# Patient Record
Sex: MALE | Race: ASIAN | NOT HISPANIC OR LATINO | URBAN - METROPOLITAN AREA
[De-identification: names, ages, dates, MRNs, and addresses within clinical notes are randomized per-mention and may not be internally consistent; named-entity substitution may affect disease eponyms.]

---

## 2019-03-03 ENCOUNTER — EMERGENCY (EMERGENCY)
Facility: HOSPITAL | Age: 64
LOS: 1 days | Discharge: HOME | End: 2019-03-03
Attending: EMERGENCY MEDICINE | Admitting: EMERGENCY MEDICINE
Payer: MEDICAID

## 2019-03-03 VITALS
HEART RATE: 98 BPM | DIASTOLIC BLOOD PRESSURE: 91 MMHG | SYSTOLIC BLOOD PRESSURE: 135 MMHG | OXYGEN SATURATION: 99 % | TEMPERATURE: 98 F | RESPIRATION RATE: 18 BRPM

## 2019-03-03 VITALS
OXYGEN SATURATION: 100 % | TEMPERATURE: 99 F | HEART RATE: 99 BPM | SYSTOLIC BLOOD PRESSURE: 165 MMHG | DIASTOLIC BLOOD PRESSURE: 99 MMHG | RESPIRATION RATE: 16 BRPM

## 2019-03-03 LAB
ALBUMIN SERPL ELPH-MCNC: 4.2 G/DL — SIGNIFICANT CHANGE UP (ref 3.3–5)
ALP SERPL-CCNC: 127 U/L — HIGH (ref 40–120)
ALT FLD-CCNC: 25 U/L — SIGNIFICANT CHANGE UP (ref 4–41)
ANION GAP SERPL CALC-SCNC: 14 MMO/L — SIGNIFICANT CHANGE UP (ref 7–14)
AST SERPL-CCNC: 19 U/L — SIGNIFICANT CHANGE UP (ref 4–40)
BASOPHILS # BLD AUTO: 0.06 K/UL — SIGNIFICANT CHANGE UP (ref 0–0.2)
BASOPHILS NFR BLD AUTO: 0.9 % — SIGNIFICANT CHANGE UP (ref 0–2)
BILIRUB SERPL-MCNC: 0.3 MG/DL — SIGNIFICANT CHANGE UP (ref 0.2–1.2)
BUN SERPL-MCNC: 12 MG/DL — SIGNIFICANT CHANGE UP (ref 7–23)
CALCIUM SERPL-MCNC: 9.4 MG/DL — SIGNIFICANT CHANGE UP (ref 8.4–10.5)
CHLORIDE SERPL-SCNC: 95 MMOL/L — LOW (ref 98–107)
CO2 SERPL-SCNC: 25 MMOL/L — SIGNIFICANT CHANGE UP (ref 22–31)
CREAT SERPL-MCNC: 0.83 MG/DL — SIGNIFICANT CHANGE UP (ref 0.5–1.3)
EOSINOPHIL # BLD AUTO: 0.14 K/UL — SIGNIFICANT CHANGE UP (ref 0–0.5)
EOSINOPHIL NFR BLD AUTO: 2.1 % — SIGNIFICANT CHANGE UP (ref 0–6)
GLUCOSE SERPL-MCNC: 426 MG/DL — HIGH (ref 70–99)
HCT VFR BLD CALC: 42.1 % — SIGNIFICANT CHANGE UP (ref 39–50)
HGB BLD-MCNC: 13.9 G/DL — SIGNIFICANT CHANGE UP (ref 13–17)
IMM GRANULOCYTES NFR BLD AUTO: 0.4 % — SIGNIFICANT CHANGE UP (ref 0–1.5)
LYMPHOCYTES # BLD AUTO: 1.3 K/UL — SIGNIFICANT CHANGE UP (ref 1–3.3)
LYMPHOCYTES # BLD AUTO: 19.2 % — SIGNIFICANT CHANGE UP (ref 13–44)
MCHC RBC-ENTMCNC: 25.2 PG — LOW (ref 27–34)
MCHC RBC-ENTMCNC: 33 % — SIGNIFICANT CHANGE UP (ref 32–36)
MCV RBC AUTO: 76.3 FL — LOW (ref 80–100)
MONOCYTES # BLD AUTO: 0.51 K/UL — SIGNIFICANT CHANGE UP (ref 0–0.9)
MONOCYTES NFR BLD AUTO: 7.5 % — SIGNIFICANT CHANGE UP (ref 2–14)
NEUTROPHILS # BLD AUTO: 4.73 K/UL — SIGNIFICANT CHANGE UP (ref 1.8–7.4)
NEUTROPHILS NFR BLD AUTO: 69.9 % — SIGNIFICANT CHANGE UP (ref 43–77)
NRBC # FLD: 0 K/UL — LOW (ref 25–125)
PLATELET # BLD AUTO: 265 K/UL — SIGNIFICANT CHANGE UP (ref 150–400)
PMV BLD: 10.5 FL — SIGNIFICANT CHANGE UP (ref 7–13)
POTASSIUM SERPL-MCNC: 4 MMOL/L — SIGNIFICANT CHANGE UP (ref 3.5–5.3)
POTASSIUM SERPL-SCNC: 4 MMOL/L — SIGNIFICANT CHANGE UP (ref 3.5–5.3)
PROT SERPL-MCNC: 7.8 G/DL — SIGNIFICANT CHANGE UP (ref 6–8.3)
RBC # BLD: 5.52 M/UL — SIGNIFICANT CHANGE UP (ref 4.2–5.8)
RBC # FLD: 14.6 % — HIGH (ref 10.3–14.5)
SODIUM SERPL-SCNC: 134 MMOL/L — LOW (ref 135–145)
TROPONIN T, HIGH SENSITIVITY: 10 NG/L — SIGNIFICANT CHANGE UP (ref ?–14)
TROPONIN T, HIGH SENSITIVITY: 10 NG/L — SIGNIFICANT CHANGE UP (ref ?–14)
WBC # BLD: 6.77 K/UL — SIGNIFICANT CHANGE UP (ref 3.8–10.5)
WBC # FLD AUTO: 6.77 K/UL — SIGNIFICANT CHANGE UP (ref 3.8–10.5)

## 2019-03-03 PROCEDURE — 76705 ECHO EXAM OF ABDOMEN: CPT | Mod: 26

## 2019-03-03 PROCEDURE — 99284 EMERGENCY DEPT VISIT MOD MDM: CPT | Mod: 25

## 2019-03-03 PROCEDURE — 93010 ELECTROCARDIOGRAM REPORT: CPT

## 2019-03-03 RX ORDER — FAMOTIDINE 10 MG/ML
20 INJECTION INTRAVENOUS ONCE
Qty: 0 | Refills: 0 | Status: COMPLETED | OUTPATIENT
Start: 2019-03-03 | End: 2019-03-03

## 2019-03-03 RX ADMIN — FAMOTIDINE 20 MILLIGRAM(S): 10 INJECTION INTRAVENOUS at 12:27

## 2019-03-03 RX ADMIN — Medication 30 MILLILITER(S): at 12:27

## 2019-03-03 NOTE — ED PROVIDER NOTE - CLINICAL SUMMARY MEDICAL DECISION MAKING FREE TEXT BOX
male with abdominal pain, no other symptoms, appears well. likely msk vs gerd, however will rule out GB disease, assess LFTs with labs, and screening EKG for STEMI. unlikely vascular or ACS given signs and symptoms. patient very well appearing. if negative work up will dc home with pmd follow up. Rosario att: 62 yo male with abdominal pain, no other symptoms, appears well. likely msk vs gerd, however will rule out GB disease, assess LFTs with labs, and screening EKG for STEMI. unlikely vascular or ACS given signs and symptoms. patient very well appearing. if negative work up will dc home with pmd follow up.

## 2019-03-03 NOTE — ED PROVIDER NOTE - PROGRESS NOTE DETAILS
Ar COLON - PT seen and reassessed.  Patient symptomatically improved.   AAOX3, NAD, VSS.  Discussed test results w/ patient, given copy of results. Patient verbalized understanding of hospital course and outpatient plans, has decisional making capacity.  Will follow-up with Primary care doctor in the next 5-7 days; patient will call for an appointment. Will return to the ED if there is any worsening of symptoms.  Patient able to ambulate w/o difficulty, is tolerating PO intake

## 2019-03-03 NOTE — ED PROVIDER NOTE - OBJECTIVE STATEMENT
63yoM hx of htn, DM pw pain in right upper abdomen for two weeks. Pain is constant, non radiating, described as soreness, not worsened by exertion or eating or laying back, not improved with home Tylenol. Patient denies fevers, chills, nausea, vomiting, diarrhea, chest pain, shortness of breath, diarrhea, weakness, numbness, tingling, headache ,recent travel or other issues.   patient with hard pebble like stools.

## 2019-03-03 NOTE — ED PROCEDURE NOTE - PROCEDURE ADDITIONAL DETAILS
67546, Ultrasound, Abdomen, limited    Focused ED ultrasound to evaluate for cholelithiasis or cholecystitis:    Indication: RUQ abdominal pain  Findings:No gallstones or gallbladder sludge visualized  Gallbladder wall wnl  gallbladder of normal size  no pericholecystic fluid  negative sonographic flanagan's  visualized CBD wnl    Impression: Normal focused ED ultrasound of the biliary system    Procedure note and images placed in chart

## 2019-03-03 NOTE — ED PROVIDER NOTE - NSFOLLOWUPINSTRUCTIONS_ED_ALL_ED_FT
1) Follow up with your doctor in 1 week. Call for an appointment.   2) Return to the ER immediately for new or worsening symptoms including uncontrollable chest pain, vomiting or other issues.   3) continue to take your home medications as prescribed.

## 2019-03-03 NOTE — ED ADULT NURSE NOTE - OBJECTIVE STATEMENT
Patient presented to ED A&O x4, with c/o RUQ abdominal pain x 2 weeks, denies any N/V/D. Blood work drawn and sent to lab.  ER physician evaluated patient, medication ordered and administered.  Will continue to monitor patient closely. Francisco J DUNCAN.

## 2019-05-27 ENCOUNTER — EMERGENCY (EMERGENCY)
Facility: HOSPITAL | Age: 64
LOS: 1 days | Discharge: HOME | End: 2019-05-27
Attending: EMERGENCY MEDICINE | Admitting: EMERGENCY MEDICINE
Payer: MEDICAID

## 2019-05-27 VITALS
DIASTOLIC BLOOD PRESSURE: 113 MMHG | OXYGEN SATURATION: 100 % | SYSTOLIC BLOOD PRESSURE: 151 MMHG | RESPIRATION RATE: 17 BRPM | HEART RATE: 101 BPM | TEMPERATURE: 98 F

## 2019-05-27 VITALS
DIASTOLIC BLOOD PRESSURE: 106 MMHG | RESPIRATION RATE: 16 BRPM | HEART RATE: 117 BPM | SYSTOLIC BLOOD PRESSURE: 176 MMHG | TEMPERATURE: 99 F | OXYGEN SATURATION: 99 %

## 2019-05-27 PROBLEM — I10 ESSENTIAL (PRIMARY) HYPERTENSION: Chronic | Status: ACTIVE | Noted: 2019-03-03

## 2019-05-27 LAB
ALBUMIN SERPL ELPH-MCNC: 4.4 G/DL — SIGNIFICANT CHANGE UP (ref 3.3–5)
ALP SERPL-CCNC: 107 U/L — SIGNIFICANT CHANGE UP (ref 40–120)
ALT FLD-CCNC: 21 U/L — SIGNIFICANT CHANGE UP (ref 4–41)
ANION GAP SERPL CALC-SCNC: 14 MMO/L — SIGNIFICANT CHANGE UP (ref 7–14)
AST SERPL-CCNC: 18 U/L — SIGNIFICANT CHANGE UP (ref 4–40)
BASE EXCESS BLDV CALC-SCNC: 2.2 MMOL/L — SIGNIFICANT CHANGE UP
BASOPHILS # BLD AUTO: 0.06 K/UL — SIGNIFICANT CHANGE UP (ref 0–0.2)
BASOPHILS NFR BLD AUTO: 0.6 % — SIGNIFICANT CHANGE UP (ref 0–2)
BILIRUB SERPL-MCNC: 0.3 MG/DL — SIGNIFICANT CHANGE UP (ref 0.2–1.2)
BLOOD GAS VENOUS - CREATININE: 0.7 MG/DL — SIGNIFICANT CHANGE UP (ref 0.5–1.3)
BLOOD GAS VENOUS - FIO2: 21 — SIGNIFICANT CHANGE UP
BUN SERPL-MCNC: 15 MG/DL — SIGNIFICANT CHANGE UP (ref 7–23)
CALCIUM SERPL-MCNC: 9.5 MG/DL — SIGNIFICANT CHANGE UP (ref 8.4–10.5)
CHLORIDE BLDV-SCNC: 98 MMOL/L — SIGNIFICANT CHANGE UP (ref 96–108)
CHLORIDE SERPL-SCNC: 95 MMOL/L — LOW (ref 98–107)
CO2 SERPL-SCNC: 23 MMOL/L — SIGNIFICANT CHANGE UP (ref 22–31)
CREAT SERPL-MCNC: 0.74 MG/DL — SIGNIFICANT CHANGE UP (ref 0.5–1.3)
EOSINOPHIL # BLD AUTO: 0.14 K/UL — SIGNIFICANT CHANGE UP (ref 0–0.5)
EOSINOPHIL NFR BLD AUTO: 1.5 % — SIGNIFICANT CHANGE UP (ref 0–6)
GAS PNL BLDV: 135 MMOL/L — LOW (ref 136–146)
GLUCOSE BLDV-MCNC: 422 MG/DL — HIGH (ref 70–99)
GLUCOSE SERPL-MCNC: 447 MG/DL — HIGH (ref 70–99)
HCO3 BLDV-SCNC: 25 MMOL/L — SIGNIFICANT CHANGE UP (ref 20–27)
HCT VFR BLD CALC: 38.6 % — LOW (ref 39–50)
HCT VFR BLDV CALC: 41 % — SIGNIFICANT CHANGE UP (ref 39–51)
HGB BLD-MCNC: 12.7 G/DL — LOW (ref 13–17)
HGB BLDV-MCNC: 13.3 G/DL — SIGNIFICANT CHANGE UP (ref 13–17)
IMM GRANULOCYTES NFR BLD AUTO: 0.5 % — SIGNIFICANT CHANGE UP (ref 0–1.5)
LACTATE BLDV-MCNC: 2.2 MMOL/L — HIGH (ref 0.5–2)
LYMPHOCYTES # BLD AUTO: 1.62 K/UL — SIGNIFICANT CHANGE UP (ref 1–3.3)
LYMPHOCYTES # BLD AUTO: 17 % — SIGNIFICANT CHANGE UP (ref 13–44)
MCHC RBC-ENTMCNC: 25 PG — LOW (ref 27–34)
MCHC RBC-ENTMCNC: 32.9 % — SIGNIFICANT CHANGE UP (ref 32–36)
MCV RBC AUTO: 76 FL — LOW (ref 80–100)
MONOCYTES # BLD AUTO: 0.84 K/UL — SIGNIFICANT CHANGE UP (ref 0–0.9)
MONOCYTES NFR BLD AUTO: 8.8 % — SIGNIFICANT CHANGE UP (ref 2–14)
NEUTROPHILS # BLD AUTO: 6.8 K/UL — SIGNIFICANT CHANGE UP (ref 1.8–7.4)
NEUTROPHILS NFR BLD AUTO: 71.6 % — SIGNIFICANT CHANGE UP (ref 43–77)
NRBC # FLD: 0 K/UL — SIGNIFICANT CHANGE UP (ref 0–0)
PCO2 BLDV: 52 MMHG — HIGH (ref 41–51)
PH BLDV: 7.34 PH — SIGNIFICANT CHANGE UP (ref 7.32–7.43)
PLATELET # BLD AUTO: 203 K/UL — SIGNIFICANT CHANGE UP (ref 150–400)
PMV BLD: 10.6 FL — SIGNIFICANT CHANGE UP (ref 7–13)
PO2 BLDV: 30 MMHG — LOW (ref 35–40)
POTASSIUM BLDV-SCNC: 4.3 MMOL/L — SIGNIFICANT CHANGE UP (ref 3.4–4.5)
POTASSIUM SERPL-MCNC: 4.3 MMOL/L — SIGNIFICANT CHANGE UP (ref 3.5–5.3)
POTASSIUM SERPL-SCNC: 4.3 MMOL/L — SIGNIFICANT CHANGE UP (ref 3.5–5.3)
PROT SERPL-MCNC: 8.1 G/DL — SIGNIFICANT CHANGE UP (ref 6–8.3)
RBC # BLD: 5.08 M/UL — SIGNIFICANT CHANGE UP (ref 4.2–5.8)
RBC # FLD: 15.3 % — HIGH (ref 10.3–14.5)
SAO2 % BLDV: 52.6 % — LOW (ref 60–85)
SODIUM SERPL-SCNC: 132 MMOL/L — LOW (ref 135–145)
TROPONIN T, HIGH SENSITIVITY: 8 NG/L — SIGNIFICANT CHANGE UP (ref ?–14)
WBC # BLD: 9.51 K/UL — SIGNIFICANT CHANGE UP (ref 3.8–10.5)
WBC # FLD AUTO: 9.51 K/UL — SIGNIFICANT CHANGE UP (ref 3.8–10.5)

## 2019-05-27 PROCEDURE — 73030 X-RAY EXAM OF SHOULDER: CPT | Mod: 26,LT

## 2019-05-27 PROCEDURE — 99284 EMERGENCY DEPT VISIT MOD MDM: CPT | Mod: 25

## 2019-05-27 PROCEDURE — 20610 DRAIN/INJ JOINT/BURSA W/O US: CPT | Mod: LT

## 2019-05-27 PROCEDURE — 71275 CT ANGIOGRAPHY CHEST: CPT | Mod: 26

## 2019-05-27 RX ORDER — SODIUM CHLORIDE 9 MG/ML
2000 INJECTION INTRAMUSCULAR; INTRAVENOUS; SUBCUTANEOUS ONCE
Refills: 0 | Status: COMPLETED | OUTPATIENT
Start: 2019-05-27 | End: 2019-05-27

## 2019-05-27 RX ORDER — LIDOCAINE 4 G/100G
1 CREAM TOPICAL ONCE
Refills: 0 | Status: COMPLETED | OUTPATIENT
Start: 2019-05-27 | End: 2019-05-27

## 2019-05-27 RX ORDER — ACETAMINOPHEN 500 MG
650 TABLET ORAL ONCE
Refills: 0 | Status: COMPLETED | OUTPATIENT
Start: 2019-05-27 | End: 2019-05-27

## 2019-05-27 RX ORDER — TRIAMCINOLONE 4 MG
40 TABLET ORAL ONCE
Refills: 0 | Status: COMPLETED | OUTPATIENT
Start: 2019-05-27 | End: 2019-05-27

## 2019-05-27 RX ORDER — LIDOCAINE HCL 20 MG/ML
9 VIAL (ML) INJECTION ONCE
Refills: 0 | Status: COMPLETED | OUTPATIENT
Start: 2019-05-27 | End: 2019-05-27

## 2019-05-27 RX ORDER — INSULIN LISPRO 100/ML
8 VIAL (ML) SUBCUTANEOUS ONCE
Refills: 0 | Status: DISCONTINUED | OUTPATIENT
Start: 2019-05-27 | End: 2019-05-27

## 2019-05-27 RX ADMIN — Medication 40 MILLIGRAM(S): at 15:16

## 2019-05-27 RX ADMIN — SODIUM CHLORIDE 2000 MILLILITER(S): 9 INJECTION INTRAMUSCULAR; INTRAVENOUS; SUBCUTANEOUS at 12:45

## 2019-05-27 RX ADMIN — Medication 9 MILLILITER(S): at 15:15

## 2019-05-27 RX ADMIN — Medication 650 MILLIGRAM(S): at 12:44

## 2019-05-27 RX ADMIN — LIDOCAINE 1 PATCH: 4 CREAM TOPICAL at 12:44

## 2019-05-27 NOTE — ED PROVIDER NOTE - ATTENDING CONTRIBUTION TO CARE
JATIN GOYAL MD: Reviewed and agree with the HPI as documented below:  63M hx htn, dm, is  by trade, p/w 1 day of L arm and shoulder pain with restricted range of motion. Pt never had this symptom before. Says pain is a 9/10. Tried a dose of ibuprofen last night with minimal relief. Denies hx of heart problems, cp, sob, cough.     Pt also complains of 3 months of R sided rib pain that radiates into his RUQ. Pt was seen in ED previously for said symptom and discharged after negative w/u. pt went and saw PCP who referred him to GI who cleared him after negative endoscopy. pt also claims he had full abdominal ultrasound also found to be neg.  CT A/P and CT chest reviewed.  D/W patient mutiple small nodules.  Patient states he had a bronchoscopy too. Rajendra attempts to explain to patient that we're not going to figure out where this chronic pain is coming from in the ER after an extensive outpt work-up with multi-specality approach and testing.        PHYSICAL EXAM:  Vital signs reviewed.  GENERAL: Patient is awake and alert and in no acute distress.  Non-toxic appearing.  A+Ox4  HEAD:  Airway patent.  No oropharyngeal edema.  No stridor.  Auricles are normal.    EYES: EOM grossly intact, conjunctiva non-injected and sclera clear  NECK: Supple, No vertebral point tenderness to palpation.  CHEST/LUNG: Lungs clear to auscultation bilaterally; no wheeze, no rhonchi,  no rales.    HEART: Regular rate and rhythm;   ABDOMEN: Soft, non-tender to palpation.  No rebound/no guarding.  Bowel sounds present x 4.   MSK/EXTREMITIES: No clubbing or cyanosis. Back is nontender, with no vertebral point tenderness to palpation, no CVAT.  Moving all 4 extremities with severe limited ROM at the LEFT shoulder  FF to 35 degress and ABDucatio to about 30 degrees.   FROM IR.    NEURO: Neurologically grossly intact.   No obvious deficits.   PSYCH: Psychiatrically normal mood and affect.  No apparent risk to self or others.       DR. BARRERA, ATTENDING MD:    I performed a face to face bedside interview with patient regarding history of present illness, review of symptoms and past medical history. I completed an independent physical exam.  I have discussed patient's plan of care with the team of health care providers.   I agree with note as stated above, having amended the EMR as needed to reflect my findings. I have discussed the assessment and plan of care.  This includes during the time I functioned as the attending physician for this patient.    JATIN GOYAL MD: Improved pain and ROM after injection.  NVI.  Understands ROM exercises to prevent adhesive capsulitis

## 2019-05-27 NOTE — ED PROVIDER NOTE - CLINICAL SUMMARY MEDICAL DECISION MAKING FREE TEXT BOX
63M hx htn, dm, p/w L arm pain w/ reduced ROM, more pain when attempts to range. Seems MSK in nature. pt notes he has not taken his insuylin since the pain started. Found to be hyperglycemic and hypertensive in triage. Will r/o DKA, CAD. Plan: labs, trop, ekg, cxr, ua, fluids, reasssess. 63M hx htn, dm, p/w L arm pain w/ reduced ROM, more pain when attempts to range. Seems MSK in nature. pt notes he has not taken his insulin since the pain started. Found to be hyperglycemic and hypertensive in triage. Will check labs for DKA, and CT angio for large PE. Plan: labs, trop, ekg, cxr, ua, fluids, reasssess.

## 2019-05-27 NOTE — ED PROVIDER NOTE - OBJECTIVE STATEMENT
63M hx htn, dm, is  by trade, p/w 1 day of L arm and shoulder pain with restricted range of motion. Pt never had this symptom before. Says pain is a 9/10. Tried a dose of ibuprofen last night with minimal relief. Denies hx of heart problems, cp, sob, cough.     Pt also complains of 3 months of R sided rib pain that radiates into his RUQ. Pt was seen in ED previously for said symptom and discharged after negative w/u. pt went and saw PCP who referred him to GI who cleared him after neagtaive endoscopy. pt also claims he had full abdominal ultrasound also found to be negtative. Pt is at a loss bc he continues to have 5-6/10 pain in the are.

## 2019-05-27 NOTE — ED ADULT NURSE NOTE - OBJECTIVE STATEMENT
Pt received to room 19 complaining of 1 day of L arm and shoulder pain, denies recent trauma to arm. Pt also complaining of R sided rib pain x 3 months. Pt states he was seen in the ED for his rib pain and discharged. pt denies chest pain, SOB, N/V/D, fever or chills. IV access obtained, labs drawn and sent.

## 2019-05-27 NOTE — ED PROVIDER NOTE - PROGRESS NOTE DETAILS
JATIN GOYAL MD: Improved pain and ROM after injection.  NVI.  Understands ROM exercises to prevent adhesive capsulitis

## 2019-05-27 NOTE — ED PROVIDER NOTE - CARE PLAN
Principal Discharge DX:	Hyperglycemia Principal Discharge DX:	Hyperglycemia  Secondary Diagnosis:	Left shoulder pain  Secondary Diagnosis:	Non-compliance with treatment

## 2019-05-27 NOTE — ED ADULT TRIAGE NOTE - CHIEF COMPLAINT QUOTE
pt c/o atraumatic non-radiating L shoulder pain x1 day, took motrin yesterday with no relief. Also c/o R-sided rib pain x 3 months. Pt tachycardic, hypertensive, hyperglycemic in triage. Denies CP/ palpitations/ SOB/ dizziness/ fevers/ chills. PMH HTN DM HLD.

## 2019-05-30 NOTE — ED PROCEDURE NOTE - CPROC ED POST PROC CARE GUIDE1
Verbal/written post procedure instructions were given to patient/caregiver./Instructed patient/caregiver regarding signs and symptoms of infection. alert and awake

## 2019-05-30 NOTE — ED PROCEDURE NOTE - PROCEDURE ADDITIONAL DETAILS
JATIN GOYAL MD: Improved pain and ROM after injection.  NVI.  Understands ROM exercises to prevent adhesive capsulitis and needs strict blood sugar control secondary to steroid intra-articular.

## 2019-10-01 ENCOUNTER — OUTPATIENT (OUTPATIENT)
Dept: OUTPATIENT SERVICES | Facility: HOSPITAL | Age: 64
LOS: 1 days | End: 2019-10-01
Payer: MEDICAID

## 2019-10-01 PROCEDURE — G9001: CPT

## 2019-10-10 ENCOUNTER — INPATIENT (INPATIENT)
Facility: HOSPITAL | Age: 64
LOS: 2 days | Discharge: HOME | End: 2019-10-13
Attending: INTERNAL MEDICINE | Admitting: INTERNAL MEDICINE
Payer: MEDICAID

## 2019-10-10 VITALS
HEART RATE: 125 BPM | TEMPERATURE: 98 F | RESPIRATION RATE: 18 BRPM | SYSTOLIC BLOOD PRESSURE: 169 MMHG | OXYGEN SATURATION: 100 % | DIASTOLIC BLOOD PRESSURE: 102 MMHG

## 2019-10-10 DIAGNOSIS — E78.5 HYPERLIPIDEMIA, UNSPECIFIED: ICD-10-CM

## 2019-10-10 DIAGNOSIS — Z29.9 ENCOUNTER FOR PROPHYLACTIC MEASURES, UNSPECIFIED: ICD-10-CM

## 2019-10-10 DIAGNOSIS — R73.9 HYPERGLYCEMIA, UNSPECIFIED: ICD-10-CM

## 2019-10-10 DIAGNOSIS — E87.2 ACIDOSIS: ICD-10-CM

## 2019-10-10 DIAGNOSIS — I10 ESSENTIAL (PRIMARY) HYPERTENSION: ICD-10-CM

## 2019-10-10 DIAGNOSIS — R33.9 RETENTION OF URINE, UNSPECIFIED: ICD-10-CM

## 2019-10-10 DIAGNOSIS — E11.9 TYPE 2 DIABETES MELLITUS WITHOUT COMPLICATIONS: ICD-10-CM

## 2019-10-10 DIAGNOSIS — E11.10 TYPE 2 DIABETES MELLITUS WITH KETOACIDOSIS WITHOUT COMA: ICD-10-CM

## 2019-10-10 DIAGNOSIS — E87.1 HYPO-OSMOLALITY AND HYPONATREMIA: ICD-10-CM

## 2019-10-10 DIAGNOSIS — E86.0 DEHYDRATION: ICD-10-CM

## 2019-10-10 LAB
ALBUMIN SERPL ELPH-MCNC: 3.5 G/DL — SIGNIFICANT CHANGE UP (ref 3.3–5)
ALBUMIN SERPL ELPH-MCNC: 4.3 G/DL — SIGNIFICANT CHANGE UP (ref 3.3–5)
ALP SERPL-CCNC: 121 U/L — HIGH (ref 40–120)
ALP SERPL-CCNC: 161 U/L — HIGH (ref 40–120)
ALT FLD-CCNC: 26 U/L — SIGNIFICANT CHANGE UP (ref 4–41)
ALT FLD-CCNC: 33 U/L — SIGNIFICANT CHANGE UP (ref 4–41)
ANION GAP SERPL CALC-SCNC: 15 MMO/L — HIGH (ref 7–14)
ANION GAP SERPL CALC-SCNC: 16 MMO/L — HIGH (ref 7–14)
ANION GAP SERPL CALC-SCNC: 17 MMO/L — HIGH (ref 7–14)
ANION GAP SERPL CALC-SCNC: 24 MMO/L — HIGH (ref 7–14)
APPEARANCE UR: CLEAR — SIGNIFICANT CHANGE UP
AST SERPL-CCNC: 21 U/L — SIGNIFICANT CHANGE UP (ref 4–40)
AST SERPL-CCNC: 27 U/L — SIGNIFICANT CHANGE UP (ref 4–40)
B-OH-BUTYR SERPL-SCNC: 4.9 MMOL/L — HIGH (ref 0–0.4)
BACTERIA # UR AUTO: NEGATIVE — SIGNIFICANT CHANGE UP
BASE EXCESS BLDV CALC-SCNC: -0.2 MMOL/L — SIGNIFICANT CHANGE UP
BASE EXCESS BLDV CALC-SCNC: -0.4 MMOL/L — SIGNIFICANT CHANGE UP
BASE EXCESS BLDV CALC-SCNC: -1.9 MMOL/L — SIGNIFICANT CHANGE UP
BASE EXCESS BLDV CALC-SCNC: -3.3 MMOL/L — SIGNIFICANT CHANGE UP
BASOPHILS # BLD AUTO: 0.05 K/UL — SIGNIFICANT CHANGE UP (ref 0–0.2)
BASOPHILS NFR BLD AUTO: 0.6 % — SIGNIFICANT CHANGE UP (ref 0–2)
BILIRUB SERPL-MCNC: 0.3 MG/DL — SIGNIFICANT CHANGE UP (ref 0.2–1.2)
BILIRUB SERPL-MCNC: < 0.2 MG/DL — LOW (ref 0.2–1.2)
BILIRUB UR-MCNC: NEGATIVE — SIGNIFICANT CHANGE UP
BLOOD GAS VENOUS - CREATININE: 0.7 MG/DL — SIGNIFICANT CHANGE UP (ref 0.5–1.3)
BLOOD GAS VENOUS - CREATININE: 0.78 MG/DL — SIGNIFICANT CHANGE UP (ref 0.5–1.3)
BLOOD GAS VENOUS - CREATININE: 0.79 MG/DL — SIGNIFICANT CHANGE UP (ref 0.5–1.3)
BLOOD GAS VENOUS - CREATININE: 0.88 MG/DL — SIGNIFICANT CHANGE UP (ref 0.5–1.3)
BLOOD GAS VENOUS - FIO2: 21 — SIGNIFICANT CHANGE UP
BLOOD GAS VENOUS - FIO2: 21 — SIGNIFICANT CHANGE UP
BLOOD UR QL VISUAL: SIGNIFICANT CHANGE UP
BUN SERPL-MCNC: 14 MG/DL — SIGNIFICANT CHANGE UP (ref 7–23)
BUN SERPL-MCNC: 15 MG/DL — SIGNIFICANT CHANGE UP (ref 7–23)
BUN SERPL-MCNC: 17 MG/DL — SIGNIFICANT CHANGE UP (ref 7–23)
BUN SERPL-MCNC: 21 MG/DL — SIGNIFICANT CHANGE UP (ref 7–23)
CALCIUM SERPL-MCNC: 8.4 MG/DL — SIGNIFICANT CHANGE UP (ref 8.4–10.5)
CALCIUM SERPL-MCNC: 8.7 MG/DL — SIGNIFICANT CHANGE UP (ref 8.4–10.5)
CALCIUM SERPL-MCNC: 8.7 MG/DL — SIGNIFICANT CHANGE UP (ref 8.4–10.5)
CALCIUM SERPL-MCNC: 9.4 MG/DL — SIGNIFICANT CHANGE UP (ref 8.4–10.5)
CHLORIDE BLDV-SCNC: 106 MMOL/L — SIGNIFICANT CHANGE UP (ref 96–108)
CHLORIDE BLDV-SCNC: 108 MMOL/L — SIGNIFICANT CHANGE UP (ref 96–108)
CHLORIDE BLDV-SCNC: 109 MMOL/L — HIGH (ref 96–108)
CHLORIDE BLDV-SCNC: 95 MMOL/L — LOW (ref 96–108)
CHLORIDE SERPL-SCNC: 102 MMOL/L — SIGNIFICANT CHANGE UP (ref 98–107)
CHLORIDE SERPL-SCNC: 102 MMOL/L — SIGNIFICANT CHANGE UP (ref 98–107)
CHLORIDE SERPL-SCNC: 104 MMOL/L — SIGNIFICANT CHANGE UP (ref 98–107)
CHLORIDE SERPL-SCNC: 87 MMOL/L — LOW (ref 98–107)
CO2 SERPL-SCNC: 18 MMOL/L — LOW (ref 22–31)
CO2 SERPL-SCNC: 20 MMOL/L — LOW (ref 22–31)
CO2 SERPL-SCNC: 20 MMOL/L — LOW (ref 22–31)
CO2 SERPL-SCNC: 21 MMOL/L — LOW (ref 22–31)
COLOR SPEC: COLORLESS — SIGNIFICANT CHANGE UP
CREAT SERPL-MCNC: 0.72 MG/DL — SIGNIFICANT CHANGE UP (ref 0.5–1.3)
CREAT SERPL-MCNC: 0.75 MG/DL — SIGNIFICANT CHANGE UP (ref 0.5–1.3)
CREAT SERPL-MCNC: 0.79 MG/DL — SIGNIFICANT CHANGE UP (ref 0.5–1.3)
CREAT SERPL-MCNC: 0.88 MG/DL — SIGNIFICANT CHANGE UP (ref 0.5–1.3)
EOSINOPHIL # BLD AUTO: 0.03 K/UL — SIGNIFICANT CHANGE UP (ref 0–0.5)
EOSINOPHIL NFR BLD AUTO: 0.3 % — SIGNIFICANT CHANGE UP (ref 0–6)
GAS PNL BLDV: 132 MMOL/L — LOW (ref 136–146)
GAS PNL BLDV: 139 MMOL/L — SIGNIFICANT CHANGE UP (ref 136–146)
GAS PNL BLDV: 140 MMOL/L — SIGNIFICANT CHANGE UP (ref 136–146)
GAS PNL BLDV: 140 MMOL/L — SIGNIFICANT CHANGE UP (ref 136–146)
GLUCOSE BLDV-MCNC: 231 MG/DL — HIGH (ref 70–99)
GLUCOSE BLDV-MCNC: 234 MG/DL — HIGH (ref 70–99)
GLUCOSE BLDV-MCNC: 274 MG/DL — HIGH (ref 70–99)
GLUCOSE BLDV-MCNC: 758 MG/DL — CRITICAL HIGH (ref 70–99)
GLUCOSE SERPL-MCNC: 239 MG/DL — HIGH (ref 70–99)
GLUCOSE SERPL-MCNC: 258 MG/DL — HIGH (ref 70–99)
GLUCOSE SERPL-MCNC: 311 MG/DL — HIGH (ref 70–99)
GLUCOSE SERPL-MCNC: 739 MG/DL — CRITICAL HIGH (ref 70–99)
GLUCOSE UR-MCNC: >1000 — HIGH
HCO3 BLDV-SCNC: 19 MMOL/L — LOW (ref 20–27)
HCO3 BLDV-SCNC: 21 MMOL/L — SIGNIFICANT CHANGE UP (ref 20–27)
HCO3 BLDV-SCNC: 22 MMOL/L — SIGNIFICANT CHANGE UP (ref 20–27)
HCO3 BLDV-SCNC: 23 MMOL/L — SIGNIFICANT CHANGE UP (ref 20–27)
HCT VFR BLD CALC: 45.9 % — SIGNIFICANT CHANGE UP (ref 39–50)
HCT VFR BLDV CALC: 42.5 % — SIGNIFICANT CHANGE UP (ref 39–51)
HCT VFR BLDV CALC: 43.2 % — SIGNIFICANT CHANGE UP (ref 39–51)
HCT VFR BLDV CALC: 43.4 % — SIGNIFICANT CHANGE UP (ref 39–51)
HCT VFR BLDV CALC: 50 % — SIGNIFICANT CHANGE UP (ref 39–51)
HGB BLD-MCNC: 14.9 G/DL — SIGNIFICANT CHANGE UP (ref 13–17)
HGB BLDV-MCNC: 13.9 G/DL — SIGNIFICANT CHANGE UP (ref 13–17)
HGB BLDV-MCNC: 14.1 G/DL — SIGNIFICANT CHANGE UP (ref 13–17)
HGB BLDV-MCNC: 14.1 G/DL — SIGNIFICANT CHANGE UP (ref 13–17)
HGB BLDV-MCNC: 16.3 G/DL — SIGNIFICANT CHANGE UP (ref 13–17)
HYALINE CASTS # UR AUTO: NEGATIVE — SIGNIFICANT CHANGE UP
IMM GRANULOCYTES NFR BLD AUTO: 0.6 % — SIGNIFICANT CHANGE UP (ref 0–1.5)
KETONES UR-MCNC: HIGH
LACTATE BLDV-MCNC: 2.1 MMOL/L — HIGH (ref 0.5–2)
LACTATE BLDV-MCNC: 2.5 MMOL/L — HIGH (ref 0.5–2)
LACTATE BLDV-MCNC: 2.6 MMOL/L — HIGH (ref 0.5–2)
LACTATE BLDV-MCNC: 3.8 MMOL/L — HIGH (ref 0.5–2)
LEUKOCYTE ESTERASE UR-ACNC: NEGATIVE — SIGNIFICANT CHANGE UP
LIDOCAIN IGE QN: 83.7 U/L — HIGH (ref 7–60)
LYMPHOCYTES # BLD AUTO: 1.12 K/UL — SIGNIFICANT CHANGE UP (ref 1–3.3)
LYMPHOCYTES # BLD AUTO: 12.5 % — LOW (ref 13–44)
MAGNESIUM SERPL-MCNC: 2 MG/DL — SIGNIFICANT CHANGE UP (ref 1.6–2.6)
MCHC RBC-ENTMCNC: 25.4 PG — LOW (ref 27–34)
MCHC RBC-ENTMCNC: 32.5 % — SIGNIFICANT CHANGE UP (ref 32–36)
MCV RBC AUTO: 78.2 FL — LOW (ref 80–100)
MONOCYTES # BLD AUTO: 0.41 K/UL — SIGNIFICANT CHANGE UP (ref 0–0.9)
MONOCYTES NFR BLD AUTO: 4.6 % — SIGNIFICANT CHANGE UP (ref 2–14)
NEUTROPHILS # BLD AUTO: 7.32 K/UL — SIGNIFICANT CHANGE UP (ref 1.8–7.4)
NEUTROPHILS NFR BLD AUTO: 81.4 % — HIGH (ref 43–77)
NITRITE UR-MCNC: NEGATIVE — SIGNIFICANT CHANGE UP
NRBC # FLD: 0 K/UL — SIGNIFICANT CHANGE UP (ref 0–0)
PCO2 BLDV: 46 MMHG — SIGNIFICANT CHANGE UP (ref 41–51)
PCO2 BLDV: 46 MMHG — SIGNIFICANT CHANGE UP (ref 41–51)
PCO2 BLDV: 49 MMHG — SIGNIFICANT CHANGE UP (ref 41–51)
PCO2 BLDV: 51 MMHG — SIGNIFICANT CHANGE UP (ref 41–51)
PH BLDV: 7.27 PH — LOW (ref 7.32–7.43)
PH BLDV: 7.32 PH — SIGNIFICANT CHANGE UP (ref 7.32–7.43)
PH BLDV: 7.33 PH — SIGNIFICANT CHANGE UP (ref 7.32–7.43)
PH BLDV: 7.35 PH — SIGNIFICANT CHANGE UP (ref 7.32–7.43)
PH UR: 6 — SIGNIFICANT CHANGE UP (ref 5–8)
PHOSPHATE SERPL-MCNC: 2.1 MG/DL — LOW (ref 2.5–4.5)
PLATELET # BLD AUTO: 294 K/UL — SIGNIFICANT CHANGE UP (ref 150–400)
PMV BLD: 11.7 FL — SIGNIFICANT CHANGE UP (ref 7–13)
PO2 BLDV: 25 MMHG — LOW (ref 35–40)
PO2 BLDV: 36 MMHG — SIGNIFICANT CHANGE UP (ref 35–40)
PO2 BLDV: < 24 MMHG — LOW (ref 35–40)
PO2 BLDV: < 24 MMHG — LOW (ref 35–40)
POTASSIUM BLDV-SCNC: 3.7 MMOL/L — SIGNIFICANT CHANGE UP (ref 3.4–4.5)
POTASSIUM BLDV-SCNC: 3.8 MMOL/L — SIGNIFICANT CHANGE UP (ref 3.4–4.5)
POTASSIUM BLDV-SCNC: 3.9 MMOL/L — SIGNIFICANT CHANGE UP (ref 3.4–4.5)
POTASSIUM BLDV-SCNC: 4.2 MMOL/L — SIGNIFICANT CHANGE UP (ref 3.4–4.5)
POTASSIUM SERPL-MCNC: 4 MMOL/L — SIGNIFICANT CHANGE UP (ref 3.5–5.3)
POTASSIUM SERPL-MCNC: 4 MMOL/L — SIGNIFICANT CHANGE UP (ref 3.5–5.3)
POTASSIUM SERPL-MCNC: 4.1 MMOL/L — SIGNIFICANT CHANGE UP (ref 3.5–5.3)
POTASSIUM SERPL-MCNC: 4.1 MMOL/L — SIGNIFICANT CHANGE UP (ref 3.5–5.3)
POTASSIUM SERPL-SCNC: 4 MMOL/L — SIGNIFICANT CHANGE UP (ref 3.5–5.3)
POTASSIUM SERPL-SCNC: 4 MMOL/L — SIGNIFICANT CHANGE UP (ref 3.5–5.3)
POTASSIUM SERPL-SCNC: 4.1 MMOL/L — SIGNIFICANT CHANGE UP (ref 3.5–5.3)
POTASSIUM SERPL-SCNC: 4.1 MMOL/L — SIGNIFICANT CHANGE UP (ref 3.5–5.3)
PROT SERPL-MCNC: 7.1 G/DL — SIGNIFICANT CHANGE UP (ref 6–8.3)
PROT SERPL-MCNC: 8.5 G/DL — HIGH (ref 6–8.3)
PROT UR-MCNC: 100 — HIGH
RBC # BLD: 5.87 M/UL — HIGH (ref 4.2–5.8)
RBC # FLD: 14.3 % — SIGNIFICANT CHANGE UP (ref 10.3–14.5)
RBC CASTS # UR COMP ASSIST: SIGNIFICANT CHANGE UP (ref 0–?)
SAO2 % BLDV: 30.5 % — LOW (ref 60–85)
SAO2 % BLDV: 34 % — LOW (ref 60–85)
SAO2 % BLDV: 37.4 % — LOW (ref 60–85)
SAO2 % BLDV: 64.8 % — SIGNIFICANT CHANGE UP (ref 60–85)
SODIUM SERPL-SCNC: 129 MMOL/L — LOW (ref 135–145)
SODIUM SERPL-SCNC: 139 MMOL/L — SIGNIFICANT CHANGE UP (ref 135–145)
SP GR SPEC: 1.03 — SIGNIFICANT CHANGE UP (ref 1–1.04)
SQUAMOUS # UR AUTO: SIGNIFICANT CHANGE UP
UROBILINOGEN FLD QL: NORMAL — SIGNIFICANT CHANGE UP
WBC # BLD: 8.98 K/UL — SIGNIFICANT CHANGE UP (ref 3.8–10.5)
WBC # FLD AUTO: 8.98 K/UL — SIGNIFICANT CHANGE UP (ref 3.8–10.5)
WBC UR QL: SIGNIFICANT CHANGE UP (ref 0–?)
YEAST BUDDING # UR COMP ASSIST: PRESENT — SIGNIFICANT CHANGE UP

## 2019-10-10 PROCEDURE — 99223 1ST HOSP IP/OBS HIGH 75: CPT | Mod: GC

## 2019-10-10 RX ORDER — DEXTROSE 50 % IN WATER 50 %
15 SYRINGE (ML) INTRAVENOUS ONCE
Refills: 0 | Status: DISCONTINUED | OUTPATIENT
Start: 2019-10-10 | End: 2019-10-13

## 2019-10-10 RX ORDER — DEXTROSE 50 % IN WATER 50 %
25 SYRINGE (ML) INTRAVENOUS ONCE
Refills: 0 | Status: DISCONTINUED | OUTPATIENT
Start: 2019-10-10 | End: 2019-10-13

## 2019-10-10 RX ORDER — SODIUM CHLORIDE 9 MG/ML
1000 INJECTION INTRAMUSCULAR; INTRAVENOUS; SUBCUTANEOUS ONCE
Refills: 0 | Status: COMPLETED | OUTPATIENT
Start: 2019-10-10 | End: 2019-10-10

## 2019-10-10 RX ORDER — AMLODIPINE BESYLATE 2.5 MG/1
5 TABLET ORAL DAILY
Refills: 0 | Status: DISCONTINUED | OUTPATIENT
Start: 2019-10-10 | End: 2019-10-13

## 2019-10-10 RX ORDER — INSULIN HUMAN 100 [IU]/ML
4 INJECTION, SOLUTION SUBCUTANEOUS
Qty: 100 | Refills: 0 | Status: DISCONTINUED | OUTPATIENT
Start: 2019-10-10 | End: 2019-10-10

## 2019-10-10 RX ORDER — SODIUM CHLORIDE 9 MG/ML
1000 INJECTION, SOLUTION INTRAVENOUS
Refills: 0 | Status: DISCONTINUED | OUTPATIENT
Start: 2019-10-10 | End: 2019-10-13

## 2019-10-10 RX ORDER — OXYBUTYNIN CHLORIDE 5 MG
5 TABLET ORAL DAILY
Refills: 0 | Status: DISCONTINUED | OUTPATIENT
Start: 2019-10-10 | End: 2019-10-13

## 2019-10-10 RX ORDER — ENOXAPARIN SODIUM 100 MG/ML
40 INJECTION SUBCUTANEOUS DAILY
Refills: 0 | Status: DISCONTINUED | OUTPATIENT
Start: 2019-10-10 | End: 2019-10-13

## 2019-10-10 RX ORDER — SODIUM CHLORIDE 9 MG/ML
1000 INJECTION, SOLUTION INTRAVENOUS ONCE
Refills: 0 | Status: COMPLETED | OUTPATIENT
Start: 2019-10-10 | End: 2019-10-10

## 2019-10-10 RX ORDER — DEXTROSE 50 % IN WATER 50 %
12.5 SYRINGE (ML) INTRAVENOUS ONCE
Refills: 0 | Status: DISCONTINUED | OUTPATIENT
Start: 2019-10-10 | End: 2019-10-13

## 2019-10-10 RX ORDER — INSULIN LISPRO 100/ML
VIAL (ML) SUBCUTANEOUS EVERY 4 HOURS
Refills: 0 | Status: DISCONTINUED | OUTPATIENT
Start: 2019-10-10 | End: 2019-10-11

## 2019-10-10 RX ORDER — SODIUM CHLORIDE 9 MG/ML
1000 INJECTION, SOLUTION INTRAVENOUS
Refills: 0 | Status: DISCONTINUED | OUTPATIENT
Start: 2019-10-10 | End: 2019-10-10

## 2019-10-10 RX ORDER — ATORVASTATIN CALCIUM 80 MG/1
10 TABLET, FILM COATED ORAL AT BEDTIME
Refills: 0 | Status: DISCONTINUED | OUTPATIENT
Start: 2019-10-10 | End: 2019-10-13

## 2019-10-10 RX ORDER — VALSARTAN 80 MG/1
320 TABLET ORAL DAILY
Refills: 0 | Status: DISCONTINUED | OUTPATIENT
Start: 2019-10-10 | End: 2019-10-13

## 2019-10-10 RX ORDER — METOPROLOL TARTRATE 50 MG
50 TABLET ORAL DAILY
Refills: 0 | Status: DISCONTINUED | OUTPATIENT
Start: 2019-10-10 | End: 2019-10-13

## 2019-10-10 RX ORDER — INSULIN GLARGINE 100 [IU]/ML
12 INJECTION, SOLUTION SUBCUTANEOUS ONCE
Refills: 0 | Status: DISCONTINUED | OUTPATIENT
Start: 2019-10-10 | End: 2019-10-10

## 2019-10-10 RX ORDER — INSULIN HUMAN 100 [IU]/ML
5 INJECTION, SOLUTION SUBCUTANEOUS ONCE
Refills: 0 | Status: DISCONTINUED | OUTPATIENT
Start: 2019-10-10 | End: 2019-10-10

## 2019-10-10 RX ORDER — SODIUM CHLORIDE 9 MG/ML
1000 INJECTION, SOLUTION INTRAVENOUS
Refills: 0 | Status: DISCONTINUED | OUTPATIENT
Start: 2019-10-10 | End: 2019-10-11

## 2019-10-10 RX ORDER — INSULIN GLARGINE 100 [IU]/ML
40 INJECTION, SOLUTION SUBCUTANEOUS ONCE
Refills: 0 | Status: COMPLETED | OUTPATIENT
Start: 2019-10-10 | End: 2019-10-10

## 2019-10-10 RX ORDER — GLUCAGON INJECTION, SOLUTION 0.5 MG/.1ML
1 INJECTION, SOLUTION SUBCUTANEOUS ONCE
Refills: 0 | Status: DISCONTINUED | OUTPATIENT
Start: 2019-10-10 | End: 2019-10-13

## 2019-10-10 RX ORDER — INSULIN LISPRO 100/ML
8 VIAL (ML) SUBCUTANEOUS
Refills: 0 | Status: DISCONTINUED | OUTPATIENT
Start: 2019-10-10 | End: 2019-10-11

## 2019-10-10 RX ADMIN — ATORVASTATIN CALCIUM 10 MILLIGRAM(S): 80 TABLET, FILM COATED ORAL at 21:47

## 2019-10-10 RX ADMIN — SODIUM CHLORIDE 200 MILLILITER(S): 9 INJECTION, SOLUTION INTRAVENOUS at 21:19

## 2019-10-10 RX ADMIN — SODIUM CHLORIDE 1000 MILLILITER(S): 9 INJECTION INTRAMUSCULAR; INTRAVENOUS; SUBCUTANEOUS at 13:47

## 2019-10-10 RX ADMIN — INSULIN HUMAN 4 UNIT(S)/HR: 100 INJECTION, SOLUTION SUBCUTANEOUS at 21:19

## 2019-10-10 RX ADMIN — INSULIN HUMAN 8 UNIT(S)/HR: 100 INJECTION, SOLUTION SUBCUTANEOUS at 14:56

## 2019-10-10 RX ADMIN — Medication 50 MILLIGRAM(S): at 21:46

## 2019-10-10 RX ADMIN — SODIUM CHLORIDE 1000 MILLILITER(S): 9 INJECTION, SOLUTION INTRAVENOUS at 16:45

## 2019-10-10 RX ADMIN — VALSARTAN 320 MILLIGRAM(S): 80 TABLET ORAL at 21:45

## 2019-10-10 RX ADMIN — SODIUM CHLORIDE 100 MILLILITER(S): 9 INJECTION, SOLUTION INTRAVENOUS at 14:56

## 2019-10-10 NOTE — ED ADULT NURSE NOTE - NSFALLRSKASSESSDT_ED_ALL_ED
[FreeTextEntry1] : Alert, comfortable, well-developed in no apparent distress  2 month old baby boy who allows to be examined. No signs of metatarsus adductus, normal foot alignment. No obvious clinical orthopedic deformities in neither his lower or upper extremities. Roldan, Ortolani and Galeazzi signs are negative. Hip abduction with flexion to 60-70° bilaterally. No hip clicks or clunks during the office visit. Normal range of motion of his knees ankles and feet for his age. Both feet are flexible. Upper extremities with full and symmetrical range of motion bilaterally. Symmetrical active movements of both lower and upper extremities. Spine clinically in the midline, no hairy patches or sacral dimples. No masses are felt in neither of the SCM muscles. Clavicles are present and intact. Full passive range of motion of the cervical spine. Right plagiocephaly. Slight trace asymmetry with his left t side less developed than his left. No skin abnormalities. Abdomen soft, non-tender, no masses. No pain to percussion of renal fossae.  10-Oct-2019 13:39

## 2019-10-10 NOTE — H&P ADULT - PROBLEM SELECTOR PLAN 8
DVT ppx: lovenox  Consults: Endo  Diet: NPO for now --> transition to CC/DASH/TLC diet  Dispo: pending clinical improvement

## 2019-10-10 NOTE — CONSULT NOTE ADULT - ASSESSMENT
64M IDDM/HTN/HLD in DKA likely secondary to a combination of post-prandial nausea inducing decreased PO intake and medication non-compliance. 64M IDDM/HTN/HLD in DKA likely secondary to a combination of post-prandial nausea inducing decreased PO intake and medication non-compliance.    Plan  #Neuro  Normal mental status  No pain needs    #Respiratory  No active respiratory symptoms  Resolved cough probably not contributory, consider CXR if new symptoms arise    #Cardiac  Tachycardia 2/2 volume depletion  Qualitatively normal cardiac function on POCUS  Lungs A-line predominate  -Additional 1-2L IV fluid now    #Renal  -Volume resuscitation as above    #FENGI  -Continue insulin gtt. Transition to SC regimen when gap closes  -Maintain K > 3.5  -Q2H lytes  -Q1H FSG  -Q2H vbg/abg  -Add IV glucose to fluids when FSG < 250  -Pseudohyponatremia, monitor for resolution with decreasing blood glucose    #Heme  -EL    #ID  -No evidence of UTI on UA  -No active pulmonary symptoms    Dispo: recheck labs in a couple hours. If gap closes he can be transitioned off the insulin drip. MICU will continue following for dispo decision. 64M IDDM/HTN/HLD in DKA likely secondary to a combination of post-prandial nausea inducing decreased PO intake and medication non-compliance.    Plan  #Neuro  Normal mental status  No pain needs    #Respiratory  No active respiratory symptoms  Resolved cough probably not contributory, consider CXR if new symptoms arise    #Cardiac  Tachycardia 2/2 volume depletion  Qualitatively normal cardiac function on POCUS  Lungs A-line predominate  -Additional 1-2L IV fluid now    #Renal  -Volume resuscitation as above    #FENGI  -Continue insulin gtt. Transition to SC regimen when gap closes  -Maintain K > 3.5  -Q2H lytes  -Q1H FSG  -Q2H vbg/abg  -Add IV glucose to fluids when FSG < 250  -Pseudohyponatremia, monitor for resolution with decreasing blood glucose    #Heme  -EL    #ID  -No evidence of UTI on UA  -No active pulmonary symptoms    Dispo: recheck labs in a couple hours. If gap closes he can be transitioned off the insulin drip. MICU will continue following for dispo decision.    Updated at 1900:  AG 24-->17, pH 7.27-->7.32, , bicarb 18 -->20. ED bridging to SC insulin. Patient admitted to hospitalist. Please re-consult if new concerns arise.    Jonathan Weil, PGY3

## 2019-10-10 NOTE — H&P ADULT - NSHPREVIEWOFSYSTEMS_GEN_ALL_CORE
REVIEW OF SYSTEMS:    CONSTITUTIONAL: +weakness, no fevers or chills   EYES/ENT: No visual changes;  No vertigo or throat pain   NECK: No pain or stiffness  RESPIRATORY: No cough, wheezing, hemoptysis; No shortness of breath  CARDIOVASCULAR: No chest pain or palpitations  GASTROINTESTINAL: No abdominal or epigastric pain. No nausea, vomiting, or hematemesis; No diarrhea or constipation. No melena or hematochezia.  GENITOURINARY: No dysuria, frequency or hematuria  NEUROLOGICAL: No numbness or weakness  SKIN: No itching, rashes  ENDO: +polyuria +polydypsia REVIEW OF SYSTEMS:    CONSTITUTIONAL: +weakness, no fevers or chills   EYES/ENT: No visual changes;  No vertigo or throat pain   NECK: No pain or stiffness  RESPIRATORY: No cough, wheezing, hemoptysis; No shortness of breath  CARDIOVASCULAR: No chest pain or palpitations  GASTROINTESTINAL: No abdominal or epigastric pain. No nausea, vomiting, or hematemesis; No diarrhea or constipation. No melena or hematochezia.  GENITOURINARY: No dysuria, frequency or hematuria  NEUROLOGICAL: No numbness or weakness  SKIN: No itching, rashes  ENDO: + polyuria + polydypsia

## 2019-10-10 NOTE — ED ADULT NURSE NOTE - OBJECTIVE STATEMENT
Pt. A&Ox4, c/o nausea and vomiting x 1 wk. Also c/o feeing thirsty and constipated. Last BM was last night but small amount. Denies abdominal pain, diarrhea, headache, dizziness or fevers. States he hasn't taken any of his meds (BP or DM) over the past week. #20g IV placed in right AC, labs sent. MD at bedside for eval. VS done as charted, breathing well on RA. Respirations even and unlabored. Tachycardic in 120s. EKG performed and placed on cardiac monitor. Will continue to monitor.

## 2019-10-10 NOTE — ED ADULT NURSE REASSESSMENT NOTE - NS ED NURSE REASSESS COMMENT FT1
Fs increased. covering MD called. Insulin increased to 4units/hr and fluids changed to D51/2NS @ 200ml/hr. Labs to be done at 10PM Fs increased. covering MD called. Insulin increased to 4units/hr and fluids changed to D51/2NS @ 200ml/hr. Labs to be done at 10PM. BP HIGH, Oral meds given. Pt stated he has not taken his prescribed med all week. covering RN aware. Fs increased. covering MD called. Insulin increased to 4units/hr and fluids changed to D51/2NS @ 200ml/hr. Labs to be done at 10PM. BP HIGH, Oral meds given as per MD Lee. Lloyd held till tomorrow.

## 2019-10-10 NOTE — H&P ADULT - PROBLEM SELECTOR PLAN 7
DVT ppx: lovenox  Consults: Endo  Diet: NPO for now --> transition to CC/DASH/TLC diet  Dispo: pending clinical improvement -c/w oxybutinin 5mg daily -c/w Oxybutynin 5mg daily

## 2019-10-10 NOTE — ED ADULT NURSE NOTE - NSIMPLEMENTINTERV_GEN_ALL_ED
Implemented All Universal Safety Interventions:  [place] to call system. Call bell, personal items and telephone within reach. Instruct patient to call for assistance. Room bathroom lighting operational. Non-slip footwear when patient is off stretcher. Physically safe environment: no spills, clutter or unnecessary equipment. Stretcher in lowest position, wheels locked, appropriate side rails in place.

## 2019-10-10 NOTE — ED PROVIDER NOTE - CLINICAL SUMMARY MEDICAL DECISION MAKING FREE TEXT BOX
63yo Sherice-speaking man with PMH T2DM (on insulin, Janumet, glipizide, pioglitazone), HTN, and HLD presenting with postprandial nausea in the setting of medication nonadherence for 1 week. Found to be tachycardiac in 120s, hypertensive to 180s/110s, and hyperglycemic on presentation, with FS >600. CBC wnl. VBG showed pH 7.27, lactate 3.8, K 4.2, pending CMP, UA, and beta-hydroxybutyurate. Will treat pt for DKA, give IVF, insulin, replete electrolytes as needed. Nausea could be secondary to gastroparesis. Plan to admit to MICU for further mgmt. 65yo Sherice-speaking man with PMH T2DM (on insulin, Janumet, glipizide, pioglitazone), HTN, and HLD presenting with postprandial nausea in the setting of medication nonadherence for 1 week. Found to be tachycardiac in 120s, hypertensive to 180s/110s, and hyperglycemic on presentation, with FS >600. CBC wnl. VBG showed pH 7.27, lactate 3.8, K 4.2, pending CMP, UA, and beta-hydroxybutyurate. Will treat pt for DKA vs HHS, give IVF, insulin, replete electrolytes as needed. Nausea could be secondary to gastroparesis. Plan to admit to MICU for further mgmt.

## 2019-10-10 NOTE — ED PROVIDER NOTE - ATTENDING CONTRIBUTION TO CARE
I performed a face to face evaluation of this patient and performed a full history and physical examination on the patient.  I agree with the resident's and medical student's history, physical examination, and plan of the patient.  65yo Sherice-speaking man with PMH T2DM (on insulin, Janumet, glipizide, pioglitazone), HTN, and HLD presenting with postprandial nausea in the setting of medication nonadherence for 1 week. Found to be tachycardiac in 120s, hypertensive to 180s/110s, and hyperglycemic on presentation, with FS >600. CBC wnl. VBG showed pH 7.27, lactate 3.8, K 4.2, pending CMP, UA, and beta-hydroxybutyurate. Will treat pt for DKA, give IVF, insulin, replete electrolytes as needed. Nausea could be secondary to gastroparesis. Plan to admit to MICU for further mgmt.  Well appearing, with soft nontender, belly, heart and lungs wnl, neuro wnl, labs, ivf, insulin and reassess

## 2019-10-10 NOTE — H&P ADULT - NSHPLABSRESULTS_GEN_ALL_CORE
LABS:                          14.9   8.98  )-----------( 294      ( 10 Oct 2019 13:20 )             45.9       10-10    139  |  102  |  15  ----------------------------<  258<H>  4.0   |  21<L>  |  0.75    Ca    8.7      10 Oct 2019 19:07    TPro  7.1  /  Alb  3.5  /  TBili  < 0.2<L>  /  DBili  x   /  AST  21  /  ALT  26  /  AlkPhos  121<H>  10-10       LIVER FUNCTIONS - ( 10 Oct 2019 17:31 )  Alb: 3.5 g/dL / Pro: 7.1 g/dL / ALK PHOS: 121 u/L / ALT: 26 u/L / AST: 21 u/L / GGT: x                    Urinalysis Basic - ( 10 Oct 2019 13:40 )    Color: COLORLESS / Appearance: CLEAR / S.029 / pH: 6.0  Gluc: >1000 / Ketone: MODERATE  / Bili: NEGATIVE / Urobili: NORMAL   Blood: TRACE / Protein: 100 / Nitrite: NEGATIVE   Leuk Esterase: NEGATIVE / RBC: 0-2 / WBC 0-2   Sq Epi: OCC / Non Sq Epi: x / Bacteria: NEGATIVE            Lactate Trend            CAPILLARY BLOOD GLUCOSE      POCT Blood Glucose.: 214 mg/dL (10 Oct 2019 20:48)            RADIOLOGY & ADDITIONAL TESTS: Reviewed EKG, 10/10; sinus tach, 115bpm, qtc 475, no acute Tw or ST changes - my reading         LABS:             14.9   8.98  )-----------( 294      ( 10 Oct 2019 13:20 )             45.9       10-10    139  |  102  |  15  ----------------------------<  258<H>  4.0   |  21<L>  |  0.75    Ca    8.7      10 Oct 2019 19:07    TPro  7.1  /  Alb  3.5  /  TBili  < 0.2<L>  /  DBili  x   /  AST  21  /  ALT  26  /  AlkPhos  121<H>  10-10       LIVER FUNCTIONS - ( 10 Oct 2019 17:31 )  Alb: 3.5 g/dL / Pro: 7.1 g/dL / ALK PHOS: 121 u/L / ALT: 26 u/L / AST: 21 u/L / GGT: x                    Urinalysis Basic - ( 10 Oct 2019 13:40 )    Color: COLORLESS / Appearance: CLEAR / S.029 / pH: 6.0  Gluc: >1000 / Ketone: MODERATE  / Bili: NEGATIVE / Urobili: NORMAL   Blood: TRACE / Protein: 100 / Nitrite: NEGATIVE   Leuk Esterase: NEGATIVE / RBC: 0-2 / WBC 0-2   Sq Epi: OCC / Non Sq Epi: x / Bacteria: NEGATIVE            Lactate Trend            CAPILLARY BLOOD GLUCOSE      POCT Blood Glucose.: 214 mg/dL (10 Oct 2019 20:48)      RADIOLOGY & ADDITIONAL TESTS: Reviewed

## 2019-10-10 NOTE — ED ADULT NURSE REASSESSMENT NOTE - NS ED NURSE REASSESS COMMENT FT1
Insulin gtt decreased to  4units/hr  @ 1700 due to drop in BS. Droped to 3units/hr and fluids changed to  D5NS @ 1800 due to BS lower than 250. labs pending. Pt A&Ox4, VSS, no complaints.

## 2019-10-10 NOTE — H&P ADULT - ASSESSMENT
65yo male (Sherice-speaking) with T2DM (on insulin, Janumet, glipizide, pioglitazone), HTN, and HLD presenting with nausea and decreased appetite for the past 1 wk found to be in DKA. 63yo male (Sherice-speaking) with T2DM (on insulin, Janumet, glipizide, pioglitazone), HTN, and HLD presenting with nausea and decreased appetite for the past 1 wk found to be in DKA c/b dehydration and pseudohyponatremia;

## 2019-10-10 NOTE — H&P ADULT - HISTORY OF PRESENT ILLNESS
63yo male (Sherice-speaking) with T2DM (on insulin, Janumet, glipizide, pioglitazone), HTN, and HLD presenting with nausea and decreased appetite for the past 1 wk. Pt reports 1 wk ago, he started becoming nauseous after eating or drinking. He would eat food or drink water and then a few minutes later would become nauseous and vomit (NBNB). Because he was not eating, he stopped taking his all of his medications since last Monday 09/30.  Pt went to his PCP regarding his symtoms and was prescribed famotidine 20mg, senna, Colace, and Tylenol without any improvement.  Denies any abdominal pain, diarrhea, has constipation (last bowel movement while in the ED).  Denies any recent fevers, chills, night sweats, URI, dysuria.  Does endorse increase in frequency of urination.  Also endorses early satiety, generalized weakness, and excessive thirst.  Currently pt does not feel nauseas.   In the ED, VS: , 98.2, /102, 100% on RA.   Found to have glucose = 739, AG 24, K 4.1, pH 7.27, BHB 4.9, c/w DKA. He received 3L NS followed by NS + 20 mEq KCL @ 100 mL/hr increased to 150cc/hr, and started on an insulin gtt @ 8U/hr, currently at 4U/hr. 63yo male (Sherice-speaking) with Type 2 DM (on insulin, Janumet, glipizide, pioglitazone), HTN, and HLD presenting with nausea and decreased appetite for the past 1 wk. Pt reports 1 wk ago, he started becoming nauseous after eating or drinking. He would eat food or drink water and then a few minutes later would become nauseous and vomit (NBNB). Because he was not eating, he stopped taking his all of his medications since last Monday 09/30.  Pt went to his PCP regarding his symptoms and was prescribed famotidine 20mg, senna, Colace, and Tylenol without any improvement.  Denies any abdominal pain, diarrhea, has constipation (last bowel movement while in the ED).  Denies any recent fevers, chills, night sweats, URI, dysuria.  Does endorse increase in frequency of urination.  Also endorses early satiety, generalized weakness, and excessive thirst.  Currently pt does not feel nauseas.   In the ED, VS: , 98.2, /102, 100% on RA.   Found to have glucose = 739, AG 24, K 4.1, pH 7.27, BHB 4.9, c/w DKA. He received 3L NS followed by NS + 20 mEq KCL @ 100 mL/hr increased to 150cc/hr, and started on an insulin gtt @ 8U/hr, currently at 4U/hr.

## 2019-10-10 NOTE — ED PROVIDER NOTE - GASTROINTESTINAL, MLM
Obese abdomen with some distension, nontender to palpation, no rebound, guarding, rigidity; Bowel sounds present

## 2019-10-10 NOTE — ED PROVIDER NOTE - CHPI ED SYMPTOMS NEG
no chills/no blood in stool/no burning urination/no hematuria/no dysuria/no diarrhea/no fever/no abdominal distension

## 2019-10-10 NOTE — ED PROVIDER NOTE - PROGRESS NOTE DETAILS
Pt was seen and examined by me, has not been compliant on his insulin for the last 1 week 2/2 feeling unwell, pt describes malaise, nausea (no vomiting), weakness, early satiety. Pt is on multiple oral agents as well as insulin but has not taken in over 1 week. Here pt is tachycardic, hypertensive, no focal findings on exam, obese abdomen but soft and nontender, BS >600 on initial finger stick, labs were sent, most likely DKA vs. uncontrolled diabetes c/b underlying infectious vs. dehydration vs. gastroparesis. IVF started, likely to require insulin gtt, will monitor carefully and repeat FS  Cher Bhatt, PGY-3 EM Spoke to pt's PCP (Dr. Tamara Boyd, [phone]), reports pt has a hx of uncontrolled DM (last A1c he had in chart was 15.3). Pt follows with an endocrinologist for DM mgmt, Dr. Carlos Spear. Also states pt has chronic upper abdominal pain, following with GI: Dr. Ashish Spear. Pt underwent EGD and colonoscopy March 2019, was normal. 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 MICU was consulted, pt currently on insulin gtt, will continue monitor sugars, close AG  Cher Bhatt, PGY-3 EM Pt's belly still soft nontender, no rebound or guarding, + bs, well appearing.  Signed out to Dr. Rodriguez insulin drip was down to 4 units per hour, BS in the 200s now with a gap closing from 24 to 17, will give lantus and then recheck blood sugar after eating, will remain on q1 finger sticks  Cher Bhatt, PGY-3 EM

## 2019-10-10 NOTE — ED ADULT NURSE REASSESSMENT NOTE - NS ED NURSE REASSESS COMMENT FT1
floor rn barbara made aware that lantus has not arriveed in ed and pharmacy will tube medication to 9s

## 2019-10-10 NOTE — H&P ADULT - PROBLEM SELECTOR PLAN 3
Pt with initial AG of 25 in the setting of DKA and lactic acidosis  -current AG is 16   -continue to monitor VBG for closure of the gap pseudohyponatremia in the setting of hyperglycemia  -currently normal  -continue to monitor daily BMP

## 2019-10-10 NOTE — H&P ADULT - PROBLEM SELECTOR PLAN 5
-at home pt on basaglar 40U BID, Janumet 50mg-1000mg, Humalog 12U BID, glipizide 10mg daily, pioglitazone 45mg daily  -c/w Lantus : depending on insulin ggt requirement --> will calculate   -c/w Humalog  -c/w ISS and monitor FS  -needs diabetes education!!  -f/u HbA1c  -endo c/s in AM -c/w atorvastatin 10mg daily

## 2019-10-10 NOTE — H&P ADULT - PROBLEM SELECTOR PLAN 2
pseudohyponatremia in the setting of hyperglycemia  -currently normal  -continue to monitor daily BMP Pt with initial AG of 25 in the setting of DKA and lactic acidosis  -current AG is 16   -continue to monitor VBG for closure of the gap Due to hyperglycemia in 700s and glycosuria;   BUN: Scr ratio 22: 0.8 initially; Improving with IVF  s/p 3L NS  Monitor lytes

## 2019-10-10 NOTE — H&P ADULT - PROBLEM SELECTOR PROBLEM 1
Diabetic ketoacidosis Type 2 diabetes mellitus with ketoacidosis without coma, with long-term current use of insulin

## 2019-10-10 NOTE — CONSULT NOTE ADULT - SUBJECTIVE AND OBJECTIVE BOX
CHIEF COMPLAINT: vomiting    HPI: 64M PMH IDDM (on insulin, Janumet, glipizide, pioglitazone)/HTN/HLD p/w nausea and vomiting for 1 week. Pt reports that 1 wk ago he started becoming nauseous after eating or drinking. He would eat food or drink water and then a few minutes later would become nauseous and feel like whatever he ingested was about to come back up. He vomited just a couple times. He stopped taking his insulin because he wasn't eating. Associated symptoms include generalized weakness, increased thirst, and urinary frequency. He denies abd pain/diarrhea/dysuria/fever/chills. He did have a cough a few days ago but that has improved and he denies any cough/dyspnea/chest pain at present. Pt went to his PCP 1 wk ago when the nausea started; PCP gave him Pepcid, Senna, Colace, and Tylenol which the patient has been taking without any improvement.    In the ED he was found have a glucose = 739, AG 24, K 4.1, pH 7.27, BHB 4.9, c/w DKA. He received 2L NS followed by NS + 20 mEq KCL @ 100 mL/hr, humalin 5U SC, and started on an insulin gtt @ 8U/hr.    REVIEW OF SYSTEMS:  Constitutional: [ ] negative [ ] fevers [ ] chills [ ] weight loss [ ] weight gain  HEENT: [ ] negative [ ] dry eyes [ ] eye irritation [ ] postnasal drip [ ] nasal congestion  CV: [ ] negative  [ ] chest pain [ ] orthopnea [ ] palpitations [ ] murmur  Resp: [ ] negative [ ] cough [ ] shortness of breath [ ] dyspnea [ ] wheezing [ ] sputum [ ] hemoptysis  GI: [ ] negative [x] nausea [x] vomiting [ ] diarrhea [ ] constipation [ ] abd pain [ ] dysphagia   : [ ] negative [ ] dysuria [ ] nocturia [ ] hematuria [ ] increased urinary frequency  Musculoskeletal: [ ] negative [ ] back pain [ ] myalgias [ ] arthralgias [ ] fracture  Skin: [ ] negative [ ] rash [ ] itch  Neurological: [ ] negative [ ] headache [ ] dizziness [ ] syncope [ ] weakness [ ] numbness  Psychiatric: [ ] negative [ ] anxiety [ ] depression  Endocrine: [ ] negative [ ] diabetes [ ] thyroid problem  Hematologic/Lymphatic: [ ] negative [ ] anemia [ ] bleeding problem  Allergic/Immunologic: [ ] negative [ ] itchy eyes [ ] nasal discharge [ ] hives [ ] angioedema  [x] All other systems negative  [ ] Unable to assess ROS because ________    OBJECTIVE:  ICU Vital Signs Last 24 Hrs  T(C): 36.8 (10 Oct 2019 12:31), Max: 36.8 (10 Oct 2019 12:31)  T(F): 98.2 (10 Oct 2019 12:31), Max: 98.2 (10 Oct 2019 12:31)  HR: 105 (10 Oct 2019 15:23) (105 - 125)  BP: 166/100 (10 Oct 2019 15:23) (166/100 - 186/116)  RR: 18 (10 Oct 2019 15:23) (18 - 22)  SpO2: 99% (10 Oct 2019 15:23) (99% - 100%)    CAPILLARY BLOOD GLUCOSE    POCT Blood Glucose.: 321 mg/dL (10 Oct 2019 17:01)    General: A&Ox3, well nourished, no acute distress  HENT: NC/AT. Posterior oropharynx clear. Patent airway  Eyes: PERRL, EOMI  CV: Tachycardiac, no m/r/g. 2+ peripheral pulses. Extremities are warm and well perfused.  Respiratory: CTAB no w/r/r. No respiratory distress.  Abdominal: soft, non-distended, non-tender, no rebound, guarding, or rigidity  Neuro: No focal deficits  Skin: no rashes  Psych: normal mood and affect    LINES: [facility]    HOSPITAL MEDICATIONS:  Standing Meds:  insulin regular Infusion 8 Unit(s)/Hr IV Continuous <Continuous>  lactated ringers Bolus 1000 milliLiter(s) IV Bolus once  sodium chloride 0.9% 1000 milliLiter(s) IV Continuous <Continuous>      PRN Meds:      LABS:                        14.9   8.98  )-----------( 294      ( 10 Oct 2019 13:20 )             45.9     Hgb Trend: 14.9<--  10-10    129<L>  |  87<L>  |  21  ----------------------------<  739<HH>  4.1   |  18<L>  |  0.88    Ca    9.4      10 Oct 2019 13:20    TPro  8.5<H>  /  Alb  4.3  /  TBili  0.3  /  DBili  x   /  AST  27  /  ALT  33  /  AlkPhos  161<H>  10-10    Creatinine Trend: 0.88<--    Urinalysis Basic - ( 10 Oct 2019 13:40 )    Color: COLORLESS / Appearance: CLEAR / S.029 / pH: 6.0  Gluc: >1000 / Ketone: MODERATE  / Bili: NEGATIVE / Urobili: NORMAL   Blood: TRACE / Protein: 100 / Nitrite: NEGATIVE   Leuk Esterase: NEGATIVE / RBC: 0-2 / WBC 0-2   Sq Epi: OCC / Non Sq Epi: x / Bacteria: NEGATIVE        Venous Blood Gas:  10-10 @ 13:20  7.27/51/< 24/19/30.5  VBG Lactate: 3.8 CHIEF COMPLAINT: vomiting    HPI: 64M PMH IDDM (on insulin, Janumet, glipizide, pioglitazone)/HTN/HLD p/w nausea and vomiting for 1 week. Pt reports that 1 wk ago he started becoming nauseous after eating or drinking. He would eat food or drink water and then a few minutes later would become nauseous and feel like whatever he ingested was about to come back up. He vomited just a couple times. He stopped taking his insulin because he wasn't eating. Associated symptoms include generalized weakness, increased thirst, and urinary frequency. He denies abd pain/diarrhea/dysuria/fever/chills. He did have a cough a few days ago but that has improved and he denies any cough/dyspnea/chest pain at present. Pt went to his PCP 1 wk ago when the nausea started; PCP gave him Pepcid, Senna, Colace, and Tylenol which the patient has been taking without any improvement.    In the ED he was found have a glucose = 739, AG 24, K 4.1, pH 7.27, BHB 4.9, c/w DKA. He received 2L NS followed by NS + 20 mEq KCL @ 100 mL/hr, humalin 5U SC, and started on an insulin gtt @ 8U/hr.    REVIEW OF SYSTEMS:  Constitutional: [ ] negative [ ] fevers [ ] chills [ ] weight loss [ ] weight gain  HEENT: [ ] negative [ ] dry eyes [ ] eye irritation [ ] postnasal drip [ ] nasal congestion  CV: [ ] negative  [ ] chest pain [ ] orthopnea [ ] palpitations [ ] murmur  Resp: [ ] negative [ ] cough [ ] shortness of breath [ ] dyspnea [ ] wheezing [ ] sputum [ ] hemoptysis  GI: [ ] negative [x] nausea [x] vomiting [ ] diarrhea [ ] constipation [ ] abd pain [ ] dysphagia   : [ ] negative [ ] dysuria [ ] nocturia [ ] hematuria [ ] increased urinary frequency  Musculoskeletal: [ ] negative [ ] back pain [ ] myalgias [ ] arthralgias [ ] fracture  Skin: [ ] negative [ ] rash [ ] itch  Neurological: [ ] negative [ ] headache [ ] dizziness [ ] syncope [ ] weakness [ ] numbness  Psychiatric: [ ] negative [ ] anxiety [ ] depression  Endocrine: [ ] negative [ ] diabetes [ ] thyroid problem  Hematologic/Lymphatic: [ ] negative [ ] anemia [ ] bleeding problem  Allergic/Immunologic: [ ] negative [ ] itchy eyes [ ] nasal discharge [ ] hives [ ] angioedema  [x] All other systems negative    OBJECTIVE:  ICU Vital Signs Last 24 Hrs  T(C): 36.8 (10 Oct 2019 12:31), Max: 36.8 (10 Oct 2019 12:31)  T(F): 98.2 (10 Oct 2019 12:31), Max: 98.2 (10 Oct 2019 12:31)  HR: 105 (10 Oct 2019 15:23) (105 - 125)  BP: 166/100 (10 Oct 2019 15:23) (166/100 - 186/116)  RR: 18 (10 Oct 2019 15:23) (18 - 22)  SpO2: 99% (10 Oct 2019 15:23) (99% - 100%)    CAPILLARY BLOOD GLUCOSE    POCT Blood Glucose.: 321 mg/dL (10 Oct 2019 17:01)    General: A&Ox3, well nourished, no acute distress  HENT: NC/AT. Posterior oropharynx clear. Patent airway  Eyes: PERRL, EOMI  CV: Tachycardiac, no m/r/g. 2+ peripheral pulses. Extremities are warm and well perfused.  Respiratory: CTAB no w/r/r. No respiratory distress.  Abdominal: soft, non-distended, non-tender, no rebound, guarding, or rigidity  Neuro: No focal deficits  Skin: no rashes  Psych: normal mood and affect    LINES: hospitals    HOSPITAL MEDICATIONS:  Standing Meds:  insulin regular Infusion 8 Unit(s)/Hr IV Continuous <Continuous>  lactated ringers Bolus 1000 milliLiter(s) IV Bolus once  sodium chloride 0.9% 1000 milliLiter(s) IV Continuous <Continuous>      PRN Meds:      LABS:                        14.9   8.98  )-----------( 294      ( 10 Oct 2019 13:20 )             45.9     Hgb Trend: 14.9<--  10-10    129<L>  |  87<L>  |  21  ----------------------------<  739<HH>  4.1   |  18<L>  |  0.88    Ca    9.4      10 Oct 2019 13:20    TPro  8.5<H>  /  Alb  4.3  /  TBili  0.3  /  DBili  x   /  AST  27  /  ALT  33  /  AlkPhos  161<H>  10-10    Creatinine Trend: 0.88<--    Urinalysis Basic - ( 10 Oct 2019 13:40 )    Color: COLORLESS / Appearance: CLEAR / S.029 / pH: 6.0  Gluc: >1000 / Ketone: MODERATE  / Bili: NEGATIVE / Urobili: NORMAL   Blood: TRACE / Protein: 100 / Nitrite: NEGATIVE   Leuk Esterase: NEGATIVE / RBC: 0-2 / WBC 0-2   Sq Epi: OCC / Non Sq Epi: x / Bacteria: NEGATIVE        Venous Blood Gas:  10-10 @ 13:20  7.27/51/< 24/19/30.5  VBG Lactate: 3.8

## 2019-10-10 NOTE — ED ADULT TRIAGE NOTE - CHIEF COMPLAINT QUOTE
C/o nausea and decreased appetite x1 week, has not taking his DM meds due to nausea. FS HI in triage. Tachycardic.. Denies dyspnea/ CP. Breathing unlabored.

## 2019-10-10 NOTE — ED PROVIDER NOTE - OBJECTIVE STATEMENT
63yo Sherice-speaking ( ID: [id number]) man with PMH T2DM (on insulin, Janumet, glipizide, pioglitazone), HTN, and HLD presenting with nausea for the past 1 wk. Pt reports 1 wk ago, he started becoming nauseous after eating or drinking. He would eat food or drink water and then a few minutes later would become nauseous and feel like whatever he ingested was about to come back up. However, he denies any actual episodes of vomiting. Because he was not eating, he stopped taking his insulin. Also endorses early satiety, generalized weakness, and excessive thirst, denies abd pain, changes in BMs (no diarrhea, has chronic hx of mild constipation), or urinary changes (no urinary frequency, no dysuria), fever/chills, weight changes. Pt went to his PCP 1 wk ago when the nausea started; PCP gave him Pepcid, Senna, Colace, and Tylenol which the patient has been taking without any improvement. Denies sick contacts, recent travel, changes in diet (although endorses decreased appetite and PO intake since nausea started), hx of abdominal surgery.

## 2019-10-10 NOTE — H&P ADULT - PROBLEM SELECTOR PLAN 1
Pt found to have BG of 739, AG 24, K 4.1, pH 7.27, BHB 4.9; received 3L Pt found to have BG of 739, AG 24, K 4.1, pH 7.27, BHB 4.9; received 3L IVF and was started on NS at 100cc/hr, his MR FS < 250 so transitioned his D5/1/2NS at 200cc/hr.    -pt most likely went into DKA b/c of possible viral gastroenteritis and not compliant with his medications  -currently insulin ggt at 4cc/hr   -recheck BMP and once AG 12-14  -once AG is closed, c/w insulin ggt for 2 hours and transition to basal/bolus  -continue to monitor BMP q 4hrs until AG closed x 2 Pt found to have BG of 739, AG 24, K 4.1, pH 7.27, BHB 4.9; received 3L IVF and was started on NS at 100cc/hr, his MR FS < 250 so transitioned his D5/1/2NS at 200cc/hr.    -pt most likely went into DKA b/c of possible viral gastroenteritis now resolved and not compliant with his medications  -currently insulin ggt at 4cc/hr   -recheck BMP and once AG 12-14  -once AG is closed, c/w insulin ggt for 2 hours and transition to basal/bolus  -continue to monitor BMP q 4hrs until AG closed x 2 Pt found to have BG of 739, AG 24, K 4.1, pH 7.27, BHB 4.9, pH=7.27; received 3L IVF and was started on NS at 100cc/hr, his MR FS < 250 so transitioned his D5/1/2NS at 200cc/hr.    -DKA likely due to possible viral gastroenteritis and non-compliance with DM regimen;   -currently insulin ggt at 4cc/hr - will transition to Lantus 40u BID   -recheck BMP and once AG 12-14  -continue to monitor BMP q 4hrs until AG closed x 2  -moderate IHSS q4h  -DM education  -DM diet  -Endocrinology c/s in AM  -at home on: basaglar 40U BID, Janumet 50mg-1000mg, Humalog 12U BID, glipizide 10mg daily, pioglitazone 45mg daily

## 2019-10-10 NOTE — ED ADULT NURSE REASSESSMENT NOTE - NS ED NURSE REASSESS COMMENT FT1
MD Lee contacted for Anion Gap of 15. . MD directed to D/C insulin and give Lantus and Humalog. I spoke to the DKA protocol and suggested leaving the gtt on, giving Lantus and Humalog then turning gtt off in 2 hrs. MD said her attending said no. Attending at bedside stating pt can go to floor. Insulin gtt to be turned off, No q 1 hr FS. Insulin and fluids D/C Lantus and humalog to be given and then pt to eat meal. Covering RN Latasha aware. Report to be given to the floor.

## 2019-10-10 NOTE — H&P ADULT - PROBLEM SELECTOR PLAN 4
-c/w atorvastatin 10mg daily Pt with initial AG of 25 in the setting of DKA and lactic acidosis  -current AG is 16   -continue to monitor VBG for closure of the gap

## 2019-10-10 NOTE — ED PROVIDER NOTE - CARE PLAN
Principal Discharge DX:	Hyperglycemia Principal Discharge DX:	Hyperglycemia  Secondary Diagnosis:	Diabetic ketoacidosis

## 2019-10-10 NOTE — ED ADULT NURSE REASSESSMENT NOTE - NS ED NURSE REASSESS COMMENT FT1
report recd from daytime rn, per covering team md patient must remain on insulin drip although anion gap is presently 16. awaiting orders for fluids @ this time, respirations are even and unlabored. vitals stable, pt offers no complaints of pain or discomfort. bed in lowest position with call bell in reach. f/s as noted will ctm closely.

## 2019-10-10 NOTE — H&P ADULT - NSHPPHYSICALEXAM_GEN_ALL_CORE
Vital Signs Last 24 Hrs  T(C): 36.8 (10 Oct 2019 12:31), Max: 36.8 (10 Oct 2019 12:31)  T(F): 98.2 (10 Oct 2019 12:31), Max: 98.2 (10 Oct 2019 12:31)  HR: 105 (10 Oct 2019 19:18) (100 - 125)  BP: 166/64 (10 Oct 2019 19:18) (143/102 - 186/116)  BP(mean): --  RR: 17 (10 Oct 2019 19:18) (17 - 22)  SpO2: 100% (10 Oct 2019 19:18) (99% - 100%)    PHYSICAL EXAM:  GENERAL: NAD, well-developed  HEAD:  Atraumatic, Normocephalic  EYES: EOMI, PERRLA, conjunctiva and sclera clear  MOUTH: dry mucous membranes   NECK: Supple, No JVD  CHEST/LUNG: Clear to auscultation bilaterally; No wheezes, rales, or ronchi;  HEART: Regular rate and rhythm; No murmurs, rubs, or gallops  ABDOMEN: Soft, Nontender, Nondistended; Bowel sounds present  EXTREMITIES:  2+ Peripheral Pulses, No clubbing, cyanosis, or edema  PSYCH: AAOx3  NEUROLOGY: non-focal; moves all 4 extremities spontanously   SKIN: back and legs has 1-2 cm erythematous circular rash; nontender nonbleeding Vital Signs Last 24 Hrs  T(C): 36.8 (10 Oct 2019 12:31), Max: 36.8 (10 Oct 2019 12:31)  T(F): 98.2 (10 Oct 2019 12:31), Max: 98.2 (10 Oct 2019 12:31)  HR: 105 (10 Oct 2019 19:18) (100 - 125)  BP: 166/64 (10 Oct 2019 19:18) (143/102 - 186/116)  BP(mean): --  RR: 17 (10 Oct 2019 19:18) (17 - 22)  SpO2: 100% (10 Oct 2019 19:18) (99% - 100%)    PHYSICAL EXAM:  GENERAL: NAD, well-developed  EYES: EOMI, PERRLA, conjunctiva and sclera clear  MOUTH: dry mucous membranes   NECK: Supple, No JVD  CHEST/LUNG: Clear to auscultation bilaterally; No wheezes, rales, or ronchi;  HEART: Regular rate and rhythm; No murmurs, rubs, or gallops  ABDOMEN: Soft, Nontender, Nondistended; Bowel sounds present  EXTREMITIES:  2+ Peripheral Pulses, No clubbing, cyanosis, or edema  PSYCH: AAOx3  NEUROLOGY: non-focal;   SKIN: back and legs has 1-2 cm erythematous circular rash; nontender nonbleeding

## 2019-10-10 NOTE — H&P ADULT - NSICDXFAMILYHX_GEN_ALL_CORE_FT
FAMILY HISTORY:  No pertinent family history in first degree relatives FAMILY HISTORY:  FH: diabetes mellitus

## 2019-10-11 DIAGNOSIS — E78.5 HYPERLIPIDEMIA, UNSPECIFIED: ICD-10-CM

## 2019-10-11 DIAGNOSIS — I10 ESSENTIAL (PRIMARY) HYPERTENSION: ICD-10-CM

## 2019-10-11 DIAGNOSIS — E11.10 TYPE 2 DIABETES MELLITUS WITH KETOACIDOSIS WITHOUT COMA: ICD-10-CM

## 2019-10-11 LAB
ALBUMIN SERPL ELPH-MCNC: 3.2 G/DL — LOW (ref 3.3–5)
ALP SERPL-CCNC: 103 U/L — SIGNIFICANT CHANGE UP (ref 40–120)
ALT FLD-CCNC: 24 U/L — SIGNIFICANT CHANGE UP (ref 4–41)
ANION GAP SERPL CALC-SCNC: 12 MMO/L — SIGNIFICANT CHANGE UP (ref 7–14)
ANION GAP SERPL CALC-SCNC: 12 MMO/L — SIGNIFICANT CHANGE UP (ref 7–14)
ANION GAP SERPL CALC-SCNC: 14 MMO/L — SIGNIFICANT CHANGE UP (ref 7–14)
ANION GAP SERPL CALC-SCNC: 14 MMO/L — SIGNIFICANT CHANGE UP (ref 7–14)
AST SERPL-CCNC: 24 U/L — SIGNIFICANT CHANGE UP (ref 4–40)
BASE EXCESS BLDV CALC-SCNC: -1.1 MMOL/L — SIGNIFICANT CHANGE UP
BASOPHILS # BLD AUTO: 0.03 K/UL — SIGNIFICANT CHANGE UP (ref 0–0.2)
BASOPHILS NFR BLD AUTO: 0.5 % — SIGNIFICANT CHANGE UP (ref 0–2)
BILIRUB SERPL-MCNC: 0.2 MG/DL — SIGNIFICANT CHANGE UP (ref 0.2–1.2)
BUN SERPL-MCNC: 11 MG/DL — SIGNIFICANT CHANGE UP (ref 7–23)
BUN SERPL-MCNC: 12 MG/DL — SIGNIFICANT CHANGE UP (ref 7–23)
BUN SERPL-MCNC: 16 MG/DL — SIGNIFICANT CHANGE UP (ref 7–23)
BUN SERPL-MCNC: 9 MG/DL — SIGNIFICANT CHANGE UP (ref 7–23)
CALCIUM SERPL-MCNC: 7.9 MG/DL — LOW (ref 8.4–10.5)
CALCIUM SERPL-MCNC: 8.1 MG/DL — LOW (ref 8.4–10.5)
CALCIUM SERPL-MCNC: 8.5 MG/DL — SIGNIFICANT CHANGE UP (ref 8.4–10.5)
CALCIUM SERPL-MCNC: 8.7 MG/DL — SIGNIFICANT CHANGE UP (ref 8.4–10.5)
CHLORIDE SERPL-SCNC: 100 MMOL/L — SIGNIFICANT CHANGE UP (ref 98–107)
CHLORIDE SERPL-SCNC: 101 MMOL/L — SIGNIFICANT CHANGE UP (ref 98–107)
CHLORIDE SERPL-SCNC: 103 MMOL/L — SIGNIFICANT CHANGE UP (ref 98–107)
CHLORIDE SERPL-SCNC: 98 MMOL/L — SIGNIFICANT CHANGE UP (ref 98–107)
CO2 SERPL-SCNC: 21 MMOL/L — LOW (ref 22–31)
CO2 SERPL-SCNC: 22 MMOL/L — SIGNIFICANT CHANGE UP (ref 22–31)
CO2 SERPL-SCNC: 24 MMOL/L — SIGNIFICANT CHANGE UP (ref 22–31)
CO2 SERPL-SCNC: 25 MMOL/L — SIGNIFICANT CHANGE UP (ref 22–31)
CREAT SERPL-MCNC: 0.69 MG/DL — SIGNIFICANT CHANGE UP (ref 0.5–1.3)
CREAT SERPL-MCNC: 0.71 MG/DL — SIGNIFICANT CHANGE UP (ref 0.5–1.3)
CREAT SERPL-MCNC: 0.71 MG/DL — SIGNIFICANT CHANGE UP (ref 0.5–1.3)
CREAT SERPL-MCNC: 0.74 MG/DL — SIGNIFICANT CHANGE UP (ref 0.5–1.3)
EOSINOPHIL # BLD AUTO: 0.1 K/UL — SIGNIFICANT CHANGE UP (ref 0–0.5)
EOSINOPHIL NFR BLD AUTO: 1.8 % — SIGNIFICANT CHANGE UP (ref 0–6)
GAS PNL BLDV: 136 MMOL/L — SIGNIFICANT CHANGE UP (ref 136–146)
GLUCOSE BLDV-MCNC: 312 MG/DL — HIGH (ref 70–99)
GLUCOSE SERPL-MCNC: 225 MG/DL — HIGH (ref 70–99)
GLUCOSE SERPL-MCNC: 245 MG/DL — HIGH (ref 70–99)
GLUCOSE SERPL-MCNC: 288 MG/DL — HIGH (ref 70–99)
GLUCOSE SERPL-MCNC: 293 MG/DL — HIGH (ref 70–99)
HBA1C BLD-MCNC: 14 % — HIGH (ref 4–5.6)
HCO3 BLDV-SCNC: 22 MMOL/L — SIGNIFICANT CHANGE UP (ref 20–27)
HCT VFR BLD CALC: 40.5 % — SIGNIFICANT CHANGE UP (ref 39–50)
HCT VFR BLDV CALC: 38.7 % — LOW (ref 39–51)
HCV AB S/CO SERPL IA: 0.14 S/CO — SIGNIFICANT CHANGE UP (ref 0–0.99)
HCV AB SERPL-IMP: SIGNIFICANT CHANGE UP
HGB BLD-MCNC: 12.5 G/DL — LOW (ref 13–17)
HGB BLDV-MCNC: 12.6 G/DL — LOW (ref 13–17)
IMM GRANULOCYTES NFR BLD AUTO: 0.7 % — SIGNIFICANT CHANGE UP (ref 0–1.5)
LACTATE BLDV-MCNC: 1.3 MMOL/L — SIGNIFICANT CHANGE UP (ref 0.5–2)
LYMPHOCYTES # BLD AUTO: 1.14 K/UL — SIGNIFICANT CHANGE UP (ref 1–3.3)
LYMPHOCYTES # BLD AUTO: 20 % — SIGNIFICANT CHANGE UP (ref 13–44)
MAGNESIUM SERPL-MCNC: 1.8 MG/DL — SIGNIFICANT CHANGE UP (ref 1.6–2.6)
MAGNESIUM SERPL-MCNC: 1.9 MG/DL — SIGNIFICANT CHANGE UP (ref 1.6–2.6)
MAGNESIUM SERPL-MCNC: 2.1 MG/DL — SIGNIFICANT CHANGE UP (ref 1.6–2.6)
MCHC RBC-ENTMCNC: 24.5 PG — LOW (ref 27–34)
MCHC RBC-ENTMCNC: 30.9 % — LOW (ref 32–36)
MCV RBC AUTO: 79.4 FL — LOW (ref 80–100)
MONOCYTES # BLD AUTO: 0.41 K/UL — SIGNIFICANT CHANGE UP (ref 0–0.9)
MONOCYTES NFR BLD AUTO: 7.2 % — SIGNIFICANT CHANGE UP (ref 2–14)
NEUTROPHILS # BLD AUTO: 3.97 K/UL — SIGNIFICANT CHANGE UP (ref 1.8–7.4)
NEUTROPHILS NFR BLD AUTO: 69.8 % — SIGNIFICANT CHANGE UP (ref 43–77)
NRBC # FLD: 0 K/UL — SIGNIFICANT CHANGE UP (ref 0–0)
PCO2 BLDV: 48 MMHG — SIGNIFICANT CHANGE UP (ref 41–51)
PH BLDV: 7.33 PH — SIGNIFICANT CHANGE UP (ref 7.32–7.43)
PHOSPHATE SERPL-MCNC: 2.1 MG/DL — LOW (ref 2.5–4.5)
PHOSPHATE SERPL-MCNC: 2.7 MG/DL — SIGNIFICANT CHANGE UP (ref 2.5–4.5)
PHOSPHATE SERPL-MCNC: 2.9 MG/DL — SIGNIFICANT CHANGE UP (ref 2.5–4.5)
PLATELET # BLD AUTO: 205 K/UL — SIGNIFICANT CHANGE UP (ref 150–400)
PMV BLD: 11.9 FL — SIGNIFICANT CHANGE UP (ref 7–13)
PO2 BLDV: 35 MMHG — SIGNIFICANT CHANGE UP (ref 35–40)
POTASSIUM BLDV-SCNC: 4.2 MMOL/L — SIGNIFICANT CHANGE UP (ref 3.4–4.5)
POTASSIUM SERPL-MCNC: 3.7 MMOL/L — SIGNIFICANT CHANGE UP (ref 3.5–5.3)
POTASSIUM SERPL-MCNC: 3.9 MMOL/L — SIGNIFICANT CHANGE UP (ref 3.5–5.3)
POTASSIUM SERPL-MCNC: 4.1 MMOL/L — SIGNIFICANT CHANGE UP (ref 3.5–5.3)
POTASSIUM SERPL-MCNC: 4.2 MMOL/L — SIGNIFICANT CHANGE UP (ref 3.5–5.3)
POTASSIUM SERPL-SCNC: 3.7 MMOL/L — SIGNIFICANT CHANGE UP (ref 3.5–5.3)
POTASSIUM SERPL-SCNC: 3.9 MMOL/L — SIGNIFICANT CHANGE UP (ref 3.5–5.3)
POTASSIUM SERPL-SCNC: 4.1 MMOL/L — SIGNIFICANT CHANGE UP (ref 3.5–5.3)
POTASSIUM SERPL-SCNC: 4.2 MMOL/L — SIGNIFICANT CHANGE UP (ref 3.5–5.3)
PROT SERPL-MCNC: 6.2 G/DL — SIGNIFICANT CHANGE UP (ref 6–8.3)
RBC # BLD: 5.1 M/UL — SIGNIFICANT CHANGE UP (ref 4.2–5.8)
RBC # FLD: 14.4 % — SIGNIFICANT CHANGE UP (ref 10.3–14.5)
SAO2 % BLDV: 61 % — SIGNIFICANT CHANGE UP (ref 60–85)
SODIUM SERPL-SCNC: 136 MMOL/L — SIGNIFICANT CHANGE UP (ref 135–145)
SODIUM SERPL-SCNC: 136 MMOL/L — SIGNIFICANT CHANGE UP (ref 135–145)
SODIUM SERPL-SCNC: 137 MMOL/L — SIGNIFICANT CHANGE UP (ref 135–145)
SODIUM SERPL-SCNC: 137 MMOL/L — SIGNIFICANT CHANGE UP (ref 135–145)
WBC # BLD: 5.69 K/UL — SIGNIFICANT CHANGE UP (ref 3.8–10.5)
WBC # FLD AUTO: 5.69 K/UL — SIGNIFICANT CHANGE UP (ref 3.8–10.5)

## 2019-10-11 PROCEDURE — 99233 SBSQ HOSP IP/OBS HIGH 50: CPT

## 2019-10-11 PROCEDURE — 99223 1ST HOSP IP/OBS HIGH 75: CPT

## 2019-10-11 RX ORDER — INSULIN LISPRO 100/ML
VIAL (ML) SUBCUTANEOUS
Refills: 0 | Status: DISCONTINUED | OUTPATIENT
Start: 2019-10-11 | End: 2019-10-13

## 2019-10-11 RX ORDER — INSULIN GLARGINE 100 [IU]/ML
40 INJECTION, SOLUTION SUBCUTANEOUS
Refills: 0 | Status: DISCONTINUED | OUTPATIENT
Start: 2019-10-11 | End: 2019-10-11

## 2019-10-11 RX ORDER — INSULIN LISPRO 100/ML
VIAL (ML) SUBCUTANEOUS AT BEDTIME
Refills: 0 | Status: DISCONTINUED | OUTPATIENT
Start: 2019-10-11 | End: 2019-10-13

## 2019-10-11 RX ORDER — INSULIN GLARGINE 100 [IU]/ML
40 INJECTION, SOLUTION SUBCUTANEOUS
Refills: 0 | Status: DISCONTINUED | OUTPATIENT
Start: 2019-10-11 | End: 2019-10-13

## 2019-10-11 RX ORDER — INSULIN LISPRO 100/ML
10 VIAL (ML) SUBCUTANEOUS
Refills: 0 | Status: DISCONTINUED | OUTPATIENT
Start: 2019-10-11 | End: 2019-10-12

## 2019-10-11 RX ORDER — INFLUENZA VIRUS VACCINE 15; 15; 15; 15 UG/.5ML; UG/.5ML; UG/.5ML; UG/.5ML
0.5 SUSPENSION INTRAMUSCULAR ONCE
Refills: 0 | Status: DISCONTINUED | OUTPATIENT
Start: 2019-10-11 | End: 2019-10-13

## 2019-10-11 RX ORDER — INSULIN GLARGINE 100 [IU]/ML
40 INJECTION, SOLUTION SUBCUTANEOUS AT BEDTIME
Refills: 0 | Status: DISCONTINUED | OUTPATIENT
Start: 2019-10-11 | End: 2019-10-11

## 2019-10-11 RX ADMIN — Medication 2: at 09:21

## 2019-10-11 RX ADMIN — INSULIN GLARGINE 40 UNIT(S): 100 INJECTION, SOLUTION SUBCUTANEOUS at 22:35

## 2019-10-11 RX ADMIN — VALSARTAN 320 MILLIGRAM(S): 80 TABLET ORAL at 14:21

## 2019-10-11 RX ADMIN — INSULIN GLARGINE 40 UNIT(S): 100 INJECTION, SOLUTION SUBCUTANEOUS at 09:27

## 2019-10-11 RX ADMIN — Medication 6: at 04:49

## 2019-10-11 RX ADMIN — Medication 5 MILLIGRAM(S): at 14:21

## 2019-10-11 RX ADMIN — Medication 8: at 18:33

## 2019-10-11 RX ADMIN — AMLODIPINE BESYLATE 5 MILLIGRAM(S): 2.5 TABLET ORAL at 07:32

## 2019-10-11 RX ADMIN — Medication 8: at 13:51

## 2019-10-11 RX ADMIN — Medication 50 MILLIGRAM(S): at 07:32

## 2019-10-11 RX ADMIN — ENOXAPARIN SODIUM 40 MILLIGRAM(S): 100 INJECTION SUBCUTANEOUS at 11:30

## 2019-10-11 RX ADMIN — INSULIN GLARGINE 40 UNIT(S): 100 INJECTION, SOLUTION SUBCUTANEOUS at 00:52

## 2019-10-11 RX ADMIN — ATORVASTATIN CALCIUM 10 MILLIGRAM(S): 80 TABLET, FILM COATED ORAL at 22:39

## 2019-10-11 RX ADMIN — Medication 10 UNIT(S): at 18:35

## 2019-10-11 NOTE — PROGRESS NOTE ADULT - PROBLEM SELECTOR PLAN 7
-c/w Oxybutynin 5mg daily DVT ppx: lovenox  Consults: Endo  Diet: DASH diet  Dispo: pending clinical improvement

## 2019-10-11 NOTE — ADVANCED PRACTICE NURSE CONSULT - ASSESSMENT
Patient was offered a  phone but refused. Patient instructed that at any time he does not understand what is being discussed/taught or CDE feels patient does not understand what is being discussed or taught, a  phone will be used. Pt with a hx of type 2 diabetes for 8-10 years. Patient states he stopped all medication because of s/s of n and v. Patient stated that he wasn't checking his blood sugars mostly due to being lazy and just busy. Upon review of patients day, it seems like he was taking humalog before breakfast and before dinner but not taking basgular. When pt shown a humalog pen, he stated that was the one he was taking. Diabetes self management education provider including s/s and treatment hypoglycemia, action and timing of humalog and basgalar insulin, injection sites and important of rotating sites, use of insulin pen, healthy eating, not skipping meals, A1c, and blood sugar targets. Patient is agreeable to take basgalar twice a day and humalog before breakfast and before dinner. Patient eats a light lunch or skips lunch so would do better not taking insulin at lunch time. Patient is agreeable to check his blood sugar before breakfast, before dinner and sometimes before bed. Patient isntructed on the importance of a follow up appt. Patient stated he wants to follow up with the endocrinologist that saw him in the hospital.  Patient demonstrated understanding of information taught

## 2019-10-11 NOTE — ADVANCED PRACTICE NURSE CONSULT - RECOMMEDATIONS
Case discussed with primary RN. RN to reinforce BGM, use of insulin pen, timing of each insulin, s/s/ and proper treatment of hypoglycemia. Patient to follow up with endocrinologist team at [address] [place] [address].

## 2019-10-11 NOTE — CONSULT NOTE ADULT - ASSESSMENT
64 yr M with HTN, HLD, Type 2 diabetes uncntrolled A1C 14 here with DKA. DKA now resoleved adn patietn transitoned to SubQ insulin. Poor Dm control is liley due to nonadherence to medicaions and lack of medical follow up. 64 yr M with HTN, HLD, Type 2 diabetes uncontrolled A1C 14 here with DKA. DKA now resolved and patient transitioned to SubQ insulin. Poor DM control is likely due to nonadherence to medications and lack of medical follow up.

## 2019-10-11 NOTE — CONSULT NOTE ADULT - PROBLEM SELECTOR RECOMMENDATION 9
-while inpateitn CHO diet and FS AC and HS  -Goal gclsueo 100-180  -Patient [id number] Lantus 40 Unist last night and 40 Units this AM  -Would leave on Moderate scale only before meals for today  -Give Lantus 40 Units tomorrow AM and from tomorrow,  start Humalgo 10 Units befreo meals plu low scale  -Patient will be discharged on basal/bolus with stoppage of glipizide, janumet and pioglitazone  -Nutrion consult  -Patient can follow up at [address] [place] [address] [phone] -while inpatient CHO diet and FS AC and HS  -Goal glucose 100-180  -Patient received Lantus 40 Units last night and 40 Units this AM  - If patient continues to be hyperglycemic, would continue Lantus 40 units BID  - Start Humalog 10 Units before meals and continue moderate scale before meals and bedtime  -Patient will be discharged on basal/bolus with stoppage of glipizide, janumet and pioglitazone  -Nutrition consult  -Patient can follow up at [address] [place] [address] [phone]

## 2019-10-11 NOTE — PROGRESS NOTE ADULT - PROBLEM SELECTOR PLAN 8
DVT ppx: lovenox  Consults: Endo  Diet: NPO for now --> transition to CC/DASH/TLC diet  Dispo: pending clinical improvement DVT ppx: lovenox  Consults: Endo  Diet: DASH diet  Dispo: pending clinical improvement

## 2019-10-11 NOTE — PROGRESS NOTE ADULT - PROBLEM SELECTOR PLAN 2
Due to hyperglycemia in 700s and glycosuria;   BUN: Scr ratio 22: 0.8 initially; Improving with IVF  s/p 3L NS  Monitor lytes Due to hyperglycemia in 700s and glycosuria;   BUN: Scr ratio 22: 0.8 initially; Improving with IVF  - s/p 3L NS  - Monitor lytes Due to hyperglycemia in 700s and glycosuria;   BUN: Scr ratio 22:0.8 initially; currently 11:0.71 improved with IVF  - s/p 3L NS  - Monitor gato Due to hyperglycemia in 700s and glycosuria;   BUN: Scr ratio 22:0.8 initially; currently 11:0.71 improved with IVF  - s/p 3L NS  - Monitoring electrolytes

## 2019-10-11 NOTE — ADVANCED PRACTICE NURSE CONSULT - REASON FOR CONSULT
64 yr M with Type 2 Diabetes, HTN, HLD, obesity her with nausea and decreased [id number]. Patient was found to be in DKA with glucose 739 HCOe 18 AG 24 BHB 4.9. He was treated in [facility] as epr DKA protocol with inuslin gtt and then transitioned to subQ insulin. Patient was diagnosed with Tyep 2 diabetes 8-10 years ago. He saw Dr Magda Soriano 3 years ago but has not recently seen any diabetes specialist He has linda prescribed a regimen of Janumet  mg BID, pioglitazone 45 mg daily, Basaglar 40 Units BID and Humalog 14 units with breakfast and dinner. Unclear how much he is adhering to this regimen. States he misses dose 1-2 X week. He was recently seen by optho- no retinopathy. No other known micro or macrovascular complications of Dm. He works as a  and usually has 2 meals a day. Breakfast usually consists of toast and eggs and dinner is usually rice/roti with adams. Has occasional soda but does not dirnk juice. Has a glucometer at home but does not check his FS.

## 2019-10-11 NOTE — CONSULT NOTE ADULT - SUBJECTIVE AND OBJECTIVE BOX
HPI:  63yo male (Sherice-speaking) with Type 2 DM (on insulin, Janumet, glipizide, pioglitazone), HTN, and HLD presenting with nausea and decreased appetite for the past 1 wk. Pt reports 1 wk ago, he started becoming nauseous after eating or drinking. He would eat food or drink water and then a few minutes later would become nauseous and vomit (NBNB). Because he was not eating, he stopped taking his all of his medications since last Monday 09/30.  Pt went to his PCP regarding his symptoms and was prescribed famotidine 20mg, senna, Colace, and Tylenol without any improvement.  Denies any abdominal pain, diarrhea, has constipation (last bowel movement while in the ED).  Denies any recent fevers, chills, night sweats, URI, dysuria.  Does endorse increase in frequency of urination.  Also endorses early satiety, generalized weakness, and excessive thirst.  Currently pt does not feel nauseas.   In the ED, VS: , 98.2, /102, 100% on RA.   Found to have glucose = 739, AG 24, K 4.1, pH 7.27, BHB 4.9, c/w DKA. He received 3L NS followed by NS + 20 mEq KCL @ 100 mL/hr increased to 150cc/hr, and started on an insulin gtt @ 8U/hr, currently at 4U/hr. (10 Oct 2019 20:59)      PAST MEDICAL & SURGICAL HISTORY:  Hyperlipidemia  Diabetes  HTN (hypertension)  No significant past surgical history      FAMILY HISTORY:  FH: diabetes mellitus      Social History:    Outpatient Medications:    MEDICATIONS  (STANDING):  amLODIPine   Tablet 5 milliGRAM(s) Oral daily  atorvastatin 10 milliGRAM(s) Oral at bedtime  dextrose 5%. 1000 milliLiter(s) (50 mL/Hr) IV Continuous <Continuous>  dextrose 50% Injectable 12.5 Gram(s) IV Push once  dextrose 50% Injectable 25 Gram(s) IV Push once  dextrose 50% Injectable 25 Gram(s) IV Push once  enoxaparin Injectable 40 milliGRAM(s) SubCutaneous daily  influenza   Vaccine 0.5 milliLiter(s) IntraMuscular once  insulin glargine Injectable (LANTUS) 40 Unit(s) SubCutaneous two times a day  insulin lispro (HumaLOG) corrective regimen sliding scale   SubCutaneous every 4 hours  insulin lispro Injectable (HumaLOG) 8 Unit(s) SubCutaneous three times a day before meals  metoprolol succinate ER 50 milliGRAM(s) Oral daily  oxybutynin 5 milliGRAM(s) Oral daily  valsartan 320 milliGRAM(s) Oral daily    MEDICATIONS  (PRN):  dextrose 40% Gel 15 Gram(s) Oral once PRN Blood Glucose LESS THAN 70 milliGRAM(s)/deciliter  glucagon  Injectable 1 milliGRAM(s) IntraMuscular once PRN Glucose LESS THAN 70 milligrams/deciliter      Allergies    No Known Allergies    Intolerances      Review of Systems:  Constitutional: No fever  Eyes: No blurry vision  Neuro: No tremors  HEENT: No pain  Cardiovascular: No chest pain, palpitations  Respiratory: No SOB, no cough  GI: No nausea, vomiting, abdominal pain  : No dysuria  Skin: no rash  Psych: no depression  Endocrine: no polyuria, polydipsia  Hem/lymph: no swelling  Osteoporosis: no fractures    ALL OTHER SYSTEMS REVIEWED AND NEGATIVE    UNABLE TO OBTAIN    PHYSICAL EXAM:  VITALS: T(C): 36.4 (10-11-19 @ 07:34)  T(F): 97.6 (10-11-19 @ 07:34), Max: 98.6 (10-10-19 @ 21:22)  HR: 86 (10-11-19 @ 07:34) (81 - 125)  BP: 149/97 (10-11-19 @ 07:34) (143/102 - 186/116)  RR:  (16 - 22)  SpO2:  (99% - 100%)  Wt(kg): --  GENERAL: NAD, well-groomed, well-developed  EYES: No proptosis, no lid lag, anicteric  HEENT:  Atraumatic, Normocephalic, moist mucous membranes  THYROID: Normal size, no palpable nodules  RESPIRATORY: Clear to auscultation bilaterally; No rales, rhonchi, wheezing, or rubs  CARDIOVASCULAR: Regular rate and rhythm; No murmurs; no peripheral edema  GI: Soft, nontender, non distended, normal bowel sounds  SKIN: Dry, intact, No rashes or lesions  MUSCULOSKELETAL: Full range of motion, normal strength  NEURO: sensation intact, extraocular movements intact, no tremor, normal reflexes  PSYCH: Alert and oriented x 3, normal affect, normal mood  CUSHING'S SIGNS: no striae    POCT Blood Glucose.: 191 mg/dL (10-11-19 @ 09:13)  POCT Blood Glucose.: 266 mg/dL (10-11-19 @ 04:43)  POCT Blood Glucose.: 189 mg/dL (10-11-19 @ 00:26)  POCT Blood Glucose.: 210 mg/dL (10-10-19 @ 23:10)  POCT Blood Glucose.: 190 mg/dL (10-10-19 @ 21:50)  POCT Blood Glucose.: 214 mg/dL (10-10-19 @ 20:48)  POCT Blood Glucose.: 207 mg/dL (10-10-19 @ 20:09)  POCT Blood Glucose.: 236 mg/dL (10-10-19 @ 18:48)  POCT Blood Glucose.: 247 mg/dL (10-10-19 @ 17:58)  POCT Blood Glucose.: 321 mg/dL (10-10-19 @ 17:01)  POCT Blood Glucose.: 506 mg/dL (10-10-19 @ 15:56)  POCT Blood Glucose.: 530 mg/dL (10-10-19 @ 15:02)  POCT Blood Glucose.: >600 mg/dL (10-10-19 @ 12:36)  POCT Blood Glucose.: >600 mg/dL (10-10-19 @ 12:35)                            12.5   5.69  )-----------( 205      ( 11 Oct 2019 05:30 )             40.5       10-11    137  |  100  |  9   ----------------------------<  225<H>  3.7   |  25  |  0.69    EGFR if : 118  EGFR if non : 102    Ca    8.5      10-11  Mg     1.9     10-11  Phos  2.1     10-11    TPro  6.2  /  Alb  3.2<L>  /  TBili  0.2  /  DBili  x   /  AST  24  /  ALT  24  /  AlkPhos  103  10-11      Thyroid Function Tests:      Hemoglobin A1C, Whole Blood: 14.0 % <H> [4.0 - 5.6] (10-11-19 @ 05:30)          Radiology: HPI:  63yo male (Sherice-speaking) with Type 2 DM (on insulin, Janumet, glipizide, pioglitazone), HTN, and HLD presenting with nausea and decreased appetite for the past 1 wk. Pt reports 1 wk ago, he started becoming nauseous after eating or drinking. He would eat food or drink water and then a few minutes later would become nauseous and vomit (NBNB). Because he was not eating, he stopped taking his all of his medications since last Monday 09/30.  Pt went to his PCP regarding his symptoms and was prescribed famotidine 20mg, senna, Colace, and Tylenol without any improvement.  Denies any abdominal pain, diarrhea, has constipation (last bowel movement while in the ED).  Denies any recent fevers, chills, night sweats, URI, dysuria.  Does endorse increase in frequency of urination.  Also endorses early satiety, generalized weakness, and excessive thirst.  Currently pt does not feel nauseas.   In the ED, VS: , 98.2, /102, 100% on RA.   Found to have glucose = 739, AG 24, K 4.1, pH 7.27, BHB 4.9, c/w DKA. He received 3L NS followed by NS + 20 mEq KCL @ 100 mL/hr increased to 150cc/hr, and started on an insulin gtt @ 8U/hr, currently at 4U/hr. (10 Oct 2019 20:59)      Endocrien History: 64 yr M with Type 2 Diabetes, HTN, HLD, obesity her with nausea and decreased [id number]. Patient was found to be in DKA with glcusoe 739 HCOe 18 AG 24 BHB 4.9. He was treated in MICu as epr DKA protocol with inuslin gtt and then transitoned to subQ insulin. Patient was diagneod with Tyep 2 diabetes 8-10 years ago. He saw Dr Magda Soriano 3 years ago but has not recently seen any diabeltes sepcialist. He has linda rpecibed a reginmen of Janumet  mg BID, pioglitazoen 45 mg daily, Basaglar 40 Units BID and Humalgo 14 units with breakfast and dinner. Unclear how much he is adheerin to this regimen. States he misses dose 1-2 X week. He was recently seen by optho- no retinopathy. No other known micor or macrovascaulr complcaitons of Dm. 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 Breakfast usually cosnsits of toast and eggs and dinner is suually rice/roti with adams. Has occail soda but does nto dirnk juice. Has a glcuoemter at home but does not chekc his FS.     PAST MEDICAL & SURGICAL HISTORY:  Hyperlipidemia  Diabetes  HTN (hypertension)  No significant past surgical history      FAMILY HISTORY:  FH: diabetes mellitus      Social History:works as , No ETOH abuse    Outpatient Medications:  · 	Janumet 50 mg-1000 mg oral tablet: Last Dose Taken:  , 1 tab(s) orally 2 times a day  · 	metoprolol succinate 50 mg oral tablet, extended release: Last Dose Taken:  , 1 tab(s) orally once a day  · 	pravastatin 20 mg oral tablet: Last Dose Taken:  , 1 tab(s) orally once a day  · 	amlodipine-valsartan 5 mg-320 mg oral tablet: Last Dose Taken:  , 1 tab(s) orally once a day  · 	Basaglar KwikPen 100 units/mL subcutaneous solution: Last Dose Taken:  , 40 unit(s) subcutaneous 2 times a day  · 	HumaLOG 100 units/mL subcutaneous solution: Last Dose Taken:  , 14 unit(s) subcutaneous 2 times a day (before meals)  · 	glipiZIDE 10 mg oral tablet, extended release: Last Dose Taken:  , 1 tab(s) orally once a day  · 	pioglitazone 45 mg oral tablet: Last Dose Taken:  , 1 tab(s) orally once a day  · 	oxybutynin 5 mg/24 hours oral tablet, extended release: Last Dose Taken:  , 1 tab(s) orally once a day      MEDICATIONS  (STANDING):  amLODIPine   Tablet 5 milliGRAM(s) Oral daily  atorvastatin 10 milliGRAM(s) Oral at bedtime  dextrose 5%. 1000 milliLiter(s) (50 mL/Hr) IV Continuous <Continuous>  dextrose 50% Injectable 12.5 Gram(s) IV Push once  dextrose 50% Injectable 25 Gram(s) IV Push once  dextrose 50% Injectable 25 Gram(s) IV Push once  enoxaparin Injectable 40 milliGRAM(s) SubCutaneous daily  influenza   Vaccine 0.5 milliLiter(s) IntraMuscular once  insulin glargine Injectable (LANTUS) 40 Unit(s) SubCutaneous two times a day  insulin lispro (HumaLOG) corrective regimen sliding scale   SubCutaneous every 4 hours  insulin lispro Injectable (HumaLOG) 8 Unit(s) SubCutaneous three times a day before meals  metoprolol succinate ER 50 milliGRAM(s) Oral daily  oxybutynin 5 milliGRAM(s) Oral daily  valsartan 320 milliGRAM(s) Oral daily    MEDICATIONS  (PRN):  dextrose 40% Gel 15 Gram(s) Oral once PRN Blood Glucose LESS THAN 70 milliGRAM(s)/deciliter  glucagon  Injectable 1 milliGRAM(s) IntraMuscular once PRN Glucose LESS THAN 70 milligrams/deciliter      Allergies    No Known Allergies    Intolerances      Review of Systems:  Constitutional: No fever  Eyes: No blurry vision  Neuro: No tremors  HEENT: No pain  Cardiovascular: No chest pain, palpitations  Respiratory: No SOB, no cough  GI: + nausea on arrival, no vomiting, abdominal pain  : No dysuria  Skin: no rash  Psych: no depression  Endocrine: no polyuria, polydipsia      ALL OTHER SYSTEMS REVIEWED AND NEGATIVE        PHYSICAL EXAM:  VITALS: T(C): 36.4 (10-11-19 @ 07:34)  T(F): 97.6 (10-11-19 @ 07:34), Max: 98.6 (10-10-19 @ 21:22)  HR: 86 (10-11-19 @ 07:34) (81 - 125)  BP: 149/97 (10-11-19 @ 07:34) (143/102 - 186/116)  RR:  (16 - 22)  SpO2:  (99% - 100%)  Wt(kg): --  GENERAL: NAD, well-groomed, obese  EYES: No proptosis, no lid lag, anicteric  HEENT:  Atraumatic, Normocephalic, moist mucous membranes  THYROID: Normal size, no palpable nodules  RESPIRATORY: Clear to auscultation bilaterally; No rales, rhonchi, wheezing, or rubs  CARDIOVASCULAR: Regular rate and rhythm; No murmurs; no peripheral edema  GI: Soft, nontender, non distended, normal bowel sounds  SKIN: Dry, intact, No rashes or lesions  PSYCH: Alert and oriented x 3, normal affect, normal mood      POCT Blood Glucose.: 191 mg/dL (10-11-19 @ 09:13)  POCT Blood Glucose.: 266 mg/dL (10-11-19 @ 04:43)  POCT Blood Glucose.: 189 mg/dL (10-11-19 @ 00:26)  POCT Blood Glucose.: 210 mg/dL (10-10-19 @ 23:10)  POCT Blood Glucose.: 190 mg/dL (10-10-19 @ 21:50)  POCT Blood Glucose.: 214 mg/dL (10-10-19 @ 20:48)  POCT Blood Glucose.: 207 mg/dL (10-10-19 @ 20:09)  POCT Blood Glucose.: 236 mg/dL (10-10-19 @ 18:48)  POCT Blood Glucose.: 247 mg/dL (10-10-19 @ 17:58)  POCT Blood Glucose.: 321 mg/dL (10-10-19 @ 17:01)  POCT Blood Glucose.: 506 mg/dL (10-10-19 @ 15:56)  POCT Blood Glucose.: 530 mg/dL (10-10-19 @ 15:02)  POCT Blood Glucose.: >600 mg/dL (10-10-19 @ 12:36)  POCT Blood Glucose.: >600 mg/dL (10-10-19 @ 12:35)                            12.5   5.69  )-----------( 205      ( 11 Oct 2019 05:30 )             40.5       10-11    137  |  100  |  9   ----------------------------<  225<H>  3.7   |  25  |  0.69    EGFR if : 118  EGFR if non : 102    Ca    8.5      10-11  Mg     1.9     10-11  Phos  2.1     10-11    TPro  6.2  /  Alb  3.2<L>  /  TBili  0.2  /  DBili  x   /  AST  24  /  ALT  24  /  AlkPhos  103  10-11        Hemoglobin A1C, Whole Blood: 14.0 % <H> [4.0 - 5.6] (10-11-19 @ 05:30) HPI:  63yo male (Sherice-speaking) with Type 2 DM (on insulin, Janumet, glipizide, pioglitazone), HTN, and HLD presenting with nausea and decreased appetite for the past 1 wk. Pt reports 1 wk ago, he started becoming nauseous after eating or drinking. He would eat food or drink water and then a few minutes later would become nauseous and vomit (NBNB). Because he was not eating, he stopped taking his all of his medications since last Monday 09/30.  Pt went to his PCP regarding his symptoms and was prescribed famotidine 20mg, senna, Colace, and Tylenol without any improvement.  Denies any abdominal pain, diarrhea, has constipation (last bowel movement while in the ED).  Denies any recent fevers, chills, night sweats, URI, dysuria.  Does endorse increase in frequency of urination.  Also endorses early satiety, generalized weakness, and excessive thirst.  Currently pt does not feel nauseas.   In the ED, VS: , 98.2, /102, 100% on RA.   Found to have glucose = 739, AG 24, K 4.1, pH 7.27, BHB 4.9, c/w DKA. He received 3L NS followed by NS + 20 mEq KCL @ 100 mL/hr increased to 150cc/hr, and started on an insulin gtt @ 8U/hr, currently at 4U/hr. (10 Oct 2019 20:59)      Endocrien History: 64 yr M with Type 2 Diabetes, HTN, HLD, obesity here with nausea and decreased appetite. Patient was found to be in DKA with glucose 739 HCO3 18 AG 24 BHB 4.9. He was treated in MICU as per DKA protocol with insulin gtt and then transitioned to subQ insulin. Patient was diagnosed with Type 2 diabetes 8-10 years ago. He saw Dr Carlos Spear 3 years ago but has not recently seen any diabetes specialist. He has been prescribed a regimen of Janumet  mg BID, pioglitazone 45 mg daily, Basaglar 40 Units BID and Humalog 14 units with breakfast and dinner. Unclear how much he is adhering to this regimen. States he misses doses 1-2 X week. He was recently seen by optho- no retinopathy. No other known micro or macrovascular complications of DM. He works as a  and usually has 2 meals a day. Breakfast usually consists of toast and eggs and dinner is usually rice/roti with adams. Has occasional soda but does not drink juice. Has a glucometer at home but does not check his FS.     PAST MEDICAL & SURGICAL HISTORY:  Hyperlipidemia  Diabetes  HTN (hypertension)  No significant past surgical history      FAMILY HISTORY:  FH: diabetes mellitus      Social History:works as , No ETOH abuse    Outpatient Medications:  · 	Janumet 50 mg-1000 mg oral tablet: Last Dose Taken:  , 1 tab(s) orally 2 times a day  · 	metoprolol succinate 50 mg oral tablet, extended release: Last Dose Taken:  , 1 tab(s) orally once a day  · 	pravastatin 20 mg oral tablet: Last Dose Taken:  , 1 tab(s) orally once a day  · 	amlodipine-valsartan 5 mg-320 mg oral tablet: Last Dose Taken:  , 1 tab(s) orally once a day  · 	Basaglar KwikPen 100 units/mL subcutaneous solution: Last Dose Taken:  , 40 unit(s) subcutaneous 2 times a day  · 	HumaLOG 100 units/mL subcutaneous solution: Last Dose Taken:  , 14 unit(s) subcutaneous 2 times a day (before meals)  · 	glipiZIDE 10 mg oral tablet, extended release: Last Dose Taken:  , 1 tab(s) orally once a day  · 	pioglitazone 45 mg oral tablet: Last Dose Taken:  , 1 tab(s) orally once a day  · 	oxybutynin 5 mg/24 hours oral tablet, extended release: Last Dose Taken:  , 1 tab(s) orally once a day      MEDICATIONS  (STANDING):  amLODIPine   Tablet 5 milliGRAM(s) Oral daily  atorvastatin 10 milliGRAM(s) Oral at bedtime  dextrose 5%. 1000 milliLiter(s) (50 mL/Hr) IV Continuous <Continuous>  dextrose 50% Injectable 12.5 Gram(s) IV Push once  dextrose 50% Injectable 25 Gram(s) IV Push once  dextrose 50% Injectable 25 Gram(s) IV Push once  enoxaparin Injectable 40 milliGRAM(s) SubCutaneous daily  influenza   Vaccine 0.5 milliLiter(s) IntraMuscular once  insulin glargine Injectable (LANTUS) 40 Unit(s) SubCutaneous two times a day  insulin lispro (HumaLOG) corrective regimen sliding scale   SubCutaneous every 4 hours  insulin lispro Injectable (HumaLOG) 8 Unit(s) SubCutaneous three times a day before meals  metoprolol succinate ER 50 milliGRAM(s) Oral daily  oxybutynin 5 milliGRAM(s) Oral daily  valsartan 320 milliGRAM(s) Oral daily    MEDICATIONS  (PRN):  dextrose 40% Gel 15 Gram(s) Oral once PRN Blood Glucose LESS THAN 70 milliGRAM(s)/deciliter  glucagon  Injectable 1 milliGRAM(s) IntraMuscular once PRN Glucose LESS THAN 70 milligrams/deciliter      Allergies    No Known Allergies    Intolerances      Review of Systems:  Constitutional: No fever  Eyes: No blurry vision  Neuro: No tremors  HEENT: No pain  Cardiovascular: No chest pain, palpitations  Respiratory: No SOB, no cough  GI: + nausea on arrival, no vomiting, abdominal pain  : No dysuria  Skin: no rash  Psych: no depression  Endocrine: no polyuria, polydipsia      ALL OTHER SYSTEMS REVIEWED AND NEGATIVE        PHYSICAL EXAM:  VITALS: T(C): 36.4 (10-11-19 @ 07:34)  T(F): 97.6 (10-11-19 @ 07:34), Max: 98.6 (10-10-19 @ 21:22)  HR: 86 (10-11-19 @ 07:34) (81 - 125)  BP: 149/97 (10-11-19 @ 07:34) (143/102 - 186/116)  RR:  (16 - 22)  SpO2:  (99% - 100%)  Wt(kg): --  GENERAL: NAD, well-groomed, obese  EYES: No proptosis, no lid lag, anicteric  HEENT:  Atraumatic, Normocephalic, moist mucous membranes  THYROID: Normal size, no palpable nodules  RESPIRATORY: Clear to auscultation bilaterally; No rales, rhonchi, wheezing, or rubs  CARDIOVASCULAR: Regular rate and rhythm; No murmurs; no peripheral edema  GI: Soft, nontender, non distended, normal bowel sounds  SKIN: Dry, intact, No rashes or lesions  PSYCH: Alert and oriented x 3, normal affect, normal mood      POCT Blood Glucose.: 191 mg/dL (10-11-19 @ 09:13)  POCT Blood Glucose.: 266 mg/dL (10-11-19 @ 04:43)  POCT Blood Glucose.: 189 mg/dL (10-11-19 @ 00:26)  POCT Blood Glucose.: 210 mg/dL (10-10-19 @ 23:10)  POCT Blood Glucose.: 190 mg/dL (10-10-19 @ 21:50)  POCT Blood Glucose.: 214 mg/dL (10-10-19 @ 20:48)  POCT Blood Glucose.: 207 mg/dL (10-10-19 @ 20:09)  POCT Blood Glucose.: 236 mg/dL (10-10-19 @ 18:48)  POCT Blood Glucose.: 247 mg/dL (10-10-19 @ 17:58)  POCT Blood Glucose.: 321 mg/dL (10-10-19 @ 17:01)  POCT Blood Glucose.: 506 mg/dL (10-10-19 @ 15:56)  POCT Blood Glucose.: 530 mg/dL (10-10-19 @ 15:02)  POCT Blood Glucose.: >600 mg/dL (10-10-19 @ 12:36)  POCT Blood Glucose.: >600 mg/dL (10-10-19 @ 12:35)                            12.5   5.69  )-----------( 205      ( 11 Oct 2019 05:30 )             40.5       10-11    137  |  100  |  9   ----------------------------<  225<H>  3.7   |  25  |  0.69    EGFR if : 118  EGFR if non : 102    Ca    8.5      10-11  Mg     1.9     10-11  Phos  2.1     10-11    TPro  6.2  /  Alb  3.2<L>  /  TBili  0.2  /  DBili  x   /  AST  24  /  ALT  24  /  AlkPhos  103  10-11        Hemoglobin A1C, Whole Blood: 14.0 % <H> [4.0 - 5.6] (10-11-19 @ 05:30)

## 2019-10-11 NOTE — PROGRESS NOTE ADULT - PROBLEM SELECTOR PLAN 3
pseudohyponatremia in the setting of hyperglycemia  -currently normal  -continue to monitor daily BMP

## 2019-10-11 NOTE — CONSULT NOTE ADULT - ATTENDING COMMENTS
65 yo presenting with DKA in setting of stopping his insulin due to n/v and decreased po intake.  Placed initially on insulin gtt, IVF and AG closing with reduced glucos now in 100's.  Suggest increase ivf to 150-200cc/h and repeat labs q4h. Agree with likelihood of imminent transition to sq insulin off gtt.  Pt does not require MICU
Vandana Tiwari (pager [phone])  On evenings and weekends, please call [phone] or page endocrine fellow on call.   Please note that this patient may be followed by different provider tomorrow. If no answer, contact endocrine fellow on call.

## 2019-10-11 NOTE — PROGRESS NOTE ADULT - PROBLEM SELECTOR PLAN 1
Pt found to have BG of 739, AG 24, K 4.1, pH 7.27, BHB 4.9, pH=7.27; received 3L IVF and was started on NS at 100cc/hr, his MR FS < 250 so transitioned his D5/1/2NS at 200cc/hr.    -DKA likely due to possible viral gastroenteritis and non-compliance with DM regimen;   -Transitioned to Lantus 40u BID   -recheck BMP and once AG 12-14  -continue to monitor BMP q 4hrs until AG closed x 2  -moderate IHSS q4h  -DM education  -DM diet  -Endocrinology c/s in AM  -at home on: basaglar 40U BID, Janumet 50mg-1000mg, Humalog 12U BID, glipizide 10mg daily, pioglitazone 45mg daily Pt found to have BG of 739, AG 24, K 4.1, pH 7.27, BHB 4.9, pH=7.27; received 3L IVF and was started on NS at 100cc/hr, his MR FS < 250 so transitioned his D5/1/2NS at 200cc/hr.    -DKA likely due to possible viral gastroenteritis and non-compliance with DM regimen  -Transitioned to Lantus 40u BID   -recheck BMP  -continue to monitor BMP q12hrs for BG levels  -moderate IHSS q4h  -DM education  -DM diet  -Endocrinology c/s in AM  -at home on: basaglar 40U BID, Janumet 50mg-1000mg, Humalog 12U BID, glipizide 10mg daily, pioglitazone 45mg daily Pt found to have BG of 739, AG 24, K 4.1, pH 7.27, BHB 4.9, pH=7.27; received 3L IVF and was started on NS at 100cc/hr, his MR FS <250, then to D5/1/2NS at 200cc/hr, transitioned to lantus 50 BID  -DKA likely due to possible viral gastroenteritis and non-compliance with DM regimen  -Transitioned to Lantus 40u BID   -recheck BMP  -continue to monitor BMP q12hrs for BG levels  -moderate IHSS q4h  -DM education  -DM diet  -Endocrinology c/s in AM  -at home on: basaglar 40U BID, Janumet 50mg-1000mg, Humalog 12U BID, glipizide 10mg daily, pioglitazone 45mg daily Pt found to have BG of 739, AG 24, K 4.1, pH 7.27, BHB 4.9, pH=7.27; received 3L IVF and was started on NS at 100cc/hr, his MR FS <250, then to D5/1/2NS at 200cc/hr, transitioned to lantus 50 BID  -DKA likely due to possible viral gastroenteritis and non-compliance with DM regimen  -Transitioned to Lantus 40u BID   -recheck BMP, FS this   -continue to monitor BMP q12hrs for BG levels  -moderate IHSS q4h  -DM education  -DM diet  -Endocrinology c/s in AM  -at home on: basaglar 40U BID, Janumet 50mg-1000mg, Humalog 12U BID, glipizide 10mg daily, pioglitazone 45mg daily Pt found to have BG of 739, AG 24, K 4.1, pH 7.27, BHB 4.9, pH=7.27; received 3L IVF and was started on NS at 100cc/hr, his MR FS <250, then to D5/1/2NS at 200cc/hr, transitioned to lantus 50 BID  -DKA likely due to possible viral gastroenteritis and non-compliance with DM regimen  -Transitioned to Lantus 40u BID   -recheck BMP, FS this   -continue to monitor BMP q12hrs for BG levels  -moderate IHSS q4h  -DM education  -DM diet  -Endocrinology c/s in AM  -at home on: basaglar 40U BID, Janumet 50mg-1000mg, Humalog 12U BID, glipizide 10mg daily, pioglitazone 45mg daily  -Dietician consulted Pt found to have BG of 739, AG 24, K 4.1, pH 7.27, BHB 4.9, pH=7.27; received 3L IVF and was started on NS at 100cc/hr, his MR FS <250, then to D5/1/2NS at 200cc/hr, transitioned to lantus 50 BID  -DKA likely due to possible viral gastroenteritis and non-compliance with DM regimen  -Transitioned to Lantus 40u BID   -recheck BMP, FS this   -continue to monitor BMP q12hrs for BG levels  -moderate IHSS q4h  -DM education  -DM diet  -Endocrinology c/s in AM - rec lantus 40 does today & lantus BID & humalog TID starting tomorrow  -at home on: basaglar 40U BID, Janumet 50mg-1000mg, Humalog 12U BID, glipizide 10mg daily, pioglitazone 45mg daily  -Dietician consulted Pt found to have BG of 739, AG 24, K 4.1, pH 7.27, BHB 4.9, pH=7.27; received 3L IVF and was started on NS at 100cc/hr, his MR FS <250, then to D5/1/2NS at 200cc/hr, transitioned to lantus 50 BID  -DKA likely due to possible viral gastroenteritis and non-compliance with DM regimen  -Transitioned to Lantus 40u BID   -recheck BMP, FS this   -continue to monitor BMP q12hrs for AG levels  -moderate IHSS q4h  -DM education  -DM diet  -Endocrinology c/s in AM - rec lantus 40 does today & lantus BID & humalog TID starting tomorrow  -at home on: basaglar 40U BID, Janumet 50mg-1000mg, Humalog 12U BID, glipizide 10mg daily, pioglitazone 45mg daily  -Dietician consulted

## 2019-10-11 NOTE — PROGRESS NOTE ADULT - ASSESSMENT
63yo male (Sherice-speaking) with T2DM (on insulin, Janumet, glipizide, pioglitazone), HTN, and HLD presenting with nausea and decreased appetite for the past 1 wk found to be in DKA c/b dehydration and pseudohyponatremia;

## 2019-10-12 LAB
ANION GAP SERPL CALC-SCNC: 10 MMO/L — SIGNIFICANT CHANGE UP (ref 7–14)
BUN SERPL-MCNC: 13 MG/DL — SIGNIFICANT CHANGE UP (ref 7–23)
CALCIUM SERPL-MCNC: 8.5 MG/DL — SIGNIFICANT CHANGE UP (ref 8.4–10.5)
CHLORIDE SERPL-SCNC: 103 MMOL/L — SIGNIFICANT CHANGE UP (ref 98–107)
CO2 SERPL-SCNC: 25 MMOL/L — SIGNIFICANT CHANGE UP (ref 22–31)
CREAT SERPL-MCNC: 0.65 MG/DL — SIGNIFICANT CHANGE UP (ref 0.5–1.3)
GLUCOSE SERPL-MCNC: 113 MG/DL — HIGH (ref 70–99)
HCT VFR BLD CALC: 38 % — LOW (ref 39–50)
HGB BLD-MCNC: 12.5 G/DL — LOW (ref 13–17)
MAGNESIUM SERPL-MCNC: 2 MG/DL — SIGNIFICANT CHANGE UP (ref 1.6–2.6)
MCHC RBC-ENTMCNC: 25.4 PG — LOW (ref 27–34)
MCHC RBC-ENTMCNC: 32.9 % — SIGNIFICANT CHANGE UP (ref 32–36)
MCV RBC AUTO: 77.1 FL — LOW (ref 80–100)
NRBC # FLD: 0 K/UL — SIGNIFICANT CHANGE UP (ref 0–0)
PHOSPHATE SERPL-MCNC: 3 MG/DL — SIGNIFICANT CHANGE UP (ref 2.5–4.5)
PLATELET # BLD AUTO: 197 K/UL — SIGNIFICANT CHANGE UP (ref 150–400)
PMV BLD: 11.4 FL — SIGNIFICANT CHANGE UP (ref 7–13)
POTASSIUM SERPL-MCNC: 3.9 MMOL/L — SIGNIFICANT CHANGE UP (ref 3.5–5.3)
POTASSIUM SERPL-SCNC: 3.9 MMOL/L — SIGNIFICANT CHANGE UP (ref 3.5–5.3)
RBC # BLD: 4.93 M/UL — SIGNIFICANT CHANGE UP (ref 4.2–5.8)
RBC # FLD: 14.2 % — SIGNIFICANT CHANGE UP (ref 10.3–14.5)
SODIUM SERPL-SCNC: 138 MMOL/L — SIGNIFICANT CHANGE UP (ref 135–145)
WBC # BLD: 6.04 K/UL — SIGNIFICANT CHANGE UP (ref 3.8–10.5)
WBC # FLD AUTO: 6.04 K/UL — SIGNIFICANT CHANGE UP (ref 3.8–10.5)

## 2019-10-12 PROCEDURE — 99232 SBSQ HOSP IP/OBS MODERATE 35: CPT | Mod: GC

## 2019-10-12 RX ORDER — POLYETHYLENE GLYCOL 3350 17 G/17G
17 POWDER, FOR SOLUTION ORAL ONCE
Refills: 0 | Status: DISCONTINUED | OUTPATIENT
Start: 2019-10-12 | End: 2019-10-12

## 2019-10-12 RX ORDER — INSULIN LISPRO 100/ML
14 VIAL (ML) SUBCUTANEOUS
Refills: 0 | Status: DISCONTINUED | OUTPATIENT
Start: 2019-10-12 | End: 2019-10-13

## 2019-10-12 RX ORDER — POLYETHYLENE GLYCOL 3350 17 G/17G
17 POWDER, FOR SOLUTION ORAL DAILY
Refills: 0 | Status: DISCONTINUED | OUTPATIENT
Start: 2019-10-12 | End: 2019-10-13

## 2019-10-12 RX ADMIN — ATORVASTATIN CALCIUM 10 MILLIGRAM(S): 80 TABLET, FILM COATED ORAL at 22:39

## 2019-10-12 RX ADMIN — INSULIN GLARGINE 40 UNIT(S): 100 INJECTION, SOLUTION SUBCUTANEOUS at 09:32

## 2019-10-12 RX ADMIN — POLYETHYLENE GLYCOL 3350 17 GRAM(S): 17 POWDER, FOR SOLUTION ORAL at 18:16

## 2019-10-12 RX ADMIN — Medication 50 MILLIGRAM(S): at 06:09

## 2019-10-12 RX ADMIN — INSULIN GLARGINE 40 UNIT(S): 100 INJECTION, SOLUTION SUBCUTANEOUS at 22:38

## 2019-10-12 RX ADMIN — AMLODIPINE BESYLATE 5 MILLIGRAM(S): 2.5 TABLET ORAL at 06:09

## 2019-10-12 RX ADMIN — Medication 4: at 22:39

## 2019-10-12 RX ADMIN — Medication 14 UNIT(S): at 18:17

## 2019-10-12 RX ADMIN — Medication 6: at 13:14

## 2019-10-12 RX ADMIN — Medication 4: at 18:17

## 2019-10-12 RX ADMIN — Medication 5 MILLIGRAM(S): at 13:19

## 2019-10-12 RX ADMIN — Medication 10 UNIT(S): at 13:15

## 2019-10-12 RX ADMIN — ENOXAPARIN SODIUM 40 MILLIGRAM(S): 100 INJECTION SUBCUTANEOUS at 13:15

## 2019-10-12 RX ADMIN — Medication 10 UNIT(S): at 09:32

## 2019-10-12 RX ADMIN — VALSARTAN 320 MILLIGRAM(S): 80 TABLET ORAL at 13:16

## 2019-10-12 NOTE — PROGRESS NOTE ADULT - PROBLEM SELECTOR PLAN 1
Pt found to have BG of 739, AG 24, K 4.1, pH 7.27, BHB 4.9, pH=7.27; received 3L IVF and was started on NS at 100cc/hr, his MR FS <250, then to D5/1/2NS at 200cc/hr, transitioned to lantus 50 BID  -DKA likely due to possible viral gastroenteritis and non-compliance with DM regimen, A1c 14  -Now on Lantus 40u BID, pre-meal 10 TID, moderate correction scale  - BMPs with closed AG, stable  - latest FS: 217 bedtime, 329 dinner, 314 lunch, 191 AM  -DM education  -DM diet  -at home on: basaglar 40U BID, Janumet 50mg-1000mg, Humalog 12U BID, glipizide 10mg daily, pioglitazone 45mg daily  -Dietician consulted

## 2019-10-12 NOTE — PROGRESS NOTE ADULT - PROBLEM SELECTOR PLAN 2
Due to hyperglycemia in 700s and glycosuria;   BUN: Scr ratio 22:0.8 initially; currently 11:0.71 improved with IVF  - s/p 3L NS  - Monitoring electrolytes

## 2019-10-12 NOTE — PROGRESS NOTE ADULT - PROBLEM SELECTOR PLAN 4
Pt with initial AG of 25 in the setting of DKA and lactic acidosis  -current AG is 14  -continue to monitor VBG for closure of the gap

## 2019-10-12 NOTE — DIETITIAN INITIAL EVALUATION ADULT. - OTHER INFO
Per chart review patient with medical history of Type 2 DM (on insulin, Janumet, glipizide, pioglitazone), HTN, and HLD presenting with nausea and decreased appetite admitted for hyperglycemia. Patient reports for the past week he was developing nausea/vomiting after eating/drinking. At baseline patient with good appetite/PO intake. Breakfast: bread, egg omelette, Lunch/dinner: sandwich or rice with adams. Denies drinking juice but reports occasionally drinking soda (1-2x per month or less). States he has limited intake of sweets. Reports checking Finger sticks "sometimes" and "sometimes" missing medications for diabetes control. NKFA. No GI distress (nausea/vomiting/diarrhea/constipation) noted at this time. Patient denies any change in weight. Extensive diet education and written instructions provided on Consistent Carbohydrate diet, Carb Counting, Label Reading and Portion Control. Per chart review patient with medical history of Type 2 DM (on insulin, Janumet, glipizide, pioglitazone), HTN, and HLD presenting with nausea and decreased appetite admitted for hyperglycemia. Patient Sherice speaking however declined  service when offered as he can also speak/understand English. Patient reports for the past week he was developing nausea/vomiting after eating/drinking. At baseline patient with good appetite/PO intake. Breakfast: bread, egg omelette, Lunch/dinner: sandwich or rice with adams. Denies drinking juice but reports occasionally drinking soda (1-2x per month or less). States he has limited intake of sweets. Reports checking Finger sticks "sometimes" and "sometimes" missing medications for diabetes control. NKFA. No GI distress (nausea/vomiting/diarrhea/constipation) noted at this time. Patient denies any change in weight. Extensive diet education and written instructions provided on Consistent Carbohydrate diet, Carb Counting, Label Reading and Portion Control.

## 2019-10-12 NOTE — PROGRESS NOTE ADULT - SUBJECTIVE AND OBJECTIVE BOX
Valeriy Rosado MD PGY1   Pager: [phone] [facility]I, [phone] [facility]  After 7: Night Float pager    Patient is a 64y old  Male who presents with a chief complaint of Nausea (11 Oct 2019 10:10)      SUBJECTIVE / OVERNIGHT EVENTS: Overnight, no acute events. Patient tolerated diet. Denying other acute complaints.    MEDICATIONS  (STANDING):  amLODIPine   Tablet 5 milliGRAM(s) Oral daily  atorvastatin 10 milliGRAM(s) Oral at bedtime  dextrose 5%. 1000 milliLiter(s) (50 mL/Hr) IV Continuous <Continuous>  dextrose 50% Injectable 12.5 Gram(s) IV Push once  dextrose 50% Injectable 25 Gram(s) IV Push once  dextrose 50% Injectable 25 Gram(s) IV Push once  enoxaparin Injectable 40 milliGRAM(s) SubCutaneous daily  influenza   Vaccine 0.5 milliLiter(s) IntraMuscular once  insulin glargine Injectable (LANTUS) 40 Unit(s) SubCutaneous two times a day  insulin lispro (HumaLOG) corrective regimen sliding scale   SubCutaneous three times a day before meals  insulin lispro (HumaLOG) corrective regimen sliding scale   SubCutaneous at bedtime  insulin lispro Injectable (HumaLOG) 10 Unit(s) SubCutaneous three times a day before meals  metoprolol succinate ER 50 milliGRAM(s) Oral daily  oxybutynin 5 milliGRAM(s) Oral daily  valsartan 320 milliGRAM(s) Oral daily    MEDICATIONS  (PRN):  dextrose 40% Gel 15 Gram(s) Oral once PRN Blood Glucose LESS THAN 70 milliGRAM(s)/deciliter  glucagon  Injectable 1 milliGRAM(s) IntraMuscular once PRN Glucose LESS THAN 70 milligrams/deciliter      Vital Signs Last 24 Hrs  T(C): 36.7 (12 Oct 2019 06:07), Max: 36.9 (11 Oct 2019 15:36)  T(F): 98 (12 Oct 2019 06:07), Max: 98.4 (11 Oct 2019 15:36)  HR: 74 (12 Oct 2019 06:07) (74 - 89)  BP: 126/65 (12 Oct 2019 06:07) (117/74 - 153/94)  BP(mean): --  RR: 15 (12 Oct 2019 06:07) (15 - 17)  SpO2: 100% (12 Oct 2019 06:07) (99% - 100%)  CAPILLARY BLOOD GLUCOSE      POCT Blood Glucose.: 217 mg/dL (11 Oct 2019 22:18)  POCT Blood Glucose.: 329 mg/dL (11 Oct 2019 18:03)  POCT Blood Glucose.: 313 mg/dL (11 Oct 2019 13:50)  POCT Blood Glucose.: 314 mg/dL (11 Oct 2019 12:16)  POCT Blood Glucose.: 191 mg/dL (11 Oct 2019 09:13)    I&O's Summary    11 Oct 2019 07:01  -  12 Oct 2019 07:00  --------------------------------------------------------  IN: 200 mL / OUT: 0 mL / NET: 200 mL        PHYSICAL EXAM:  GENERAL: NAD, well-developed  EYES: EOMI, PERRLA, conjunctiva and sclera clear  NECK:  No JVD  CHEST/LUNG: CTABL ; No wheeze  HEART: RRR; No murmurs  ABDOMEN: Soft, Nontender, Nondistended; Bowel sounds present  : No [place]  EXTREMITIES:  2+ Peripheral Pulses, No edema  PSYCH: AAOx3  NEUROLOGY: non-focal  SKIN: No rashes or lesions. No sacral ulcer    LABS:                        12.5   6.04  )-----------( 197      ( 12 Oct 2019 05:31 )             38.0     10-    138  |  103  |  13  ----------------------------<  113<H>  3.9   |  25  |  0.65    Ca    8.5      12 Oct 2019 05:31  Phos  3.0     10-12  Mg     2.0     10-12    TPro  6.2  /  Alb  3.2<L>  /  TBili  0.2  /  DBili  x   /  AST  24  /  ALT  24  /  AlkPhos  103  10-11          Urinalysis Basic - ( 10 Oct 2019 13:40 )    Color: COLORLESS / Appearance: CLEAR / S.029 / pH: 6.0  Gluc: >1000 / Ketone: MODERATE  / Bili: NEGATIVE / Urobili: NORMAL   Blood: TRACE / Protein: 100 / Nitrite: NEGATIVE   Leuk Esterase: NEGATIVE / RBC: 0-2 / WBC 0-2   Sq Epi: OCC / Non Sq Epi: x / Bacteria: NEGATIVE        Microbiology;        RADIOLOGY & ADDITIONAL TESTS:    Imaging Personally Reviewed:          Consultant(s) Notes Reviewed:  endocrine    Care Discussed with Consultants/Other Providers: endocrine

## 2019-10-12 NOTE — DIETITIAN INITIAL EVALUATION ADULT. - PERTINENT LABORATORY DATA
10-12 Na 138 mmol/L Glu 113 mg/dL<H> K+ 3.9 mmol/L Cr 0.65 mg/dL BUN 13 mg/dL Phos 3.0 mg/dL Alb n/a   PAB n/a   Hgb 12.5 g/dL<L> Hct 38.0 %<L>  Hemoglobin A1C, Whole Blood: 14.0 % (10-11-19 @ 05:30)  Glucose, Serum: 113 mg/dL <H>  Glucose, Serum: 245 mg/dL <H>   24Hr FS:284 mg/dL  133 mg/dL  217 mg/dL  329 mg/dL

## 2019-10-12 NOTE — DIETITIAN INITIAL EVALUATION ADULT. - RD TO REMAIN AVAILABLE
1. Continue Consistent Carbohydrate, DASH/TLC (cholesterol and sodium restricted) diet. 2. Diet education provided on Consistent Carbohydrate modifications. 3. Please Encourage po intake, assist with meals and menu selections, provide alternatives PRN. 4. Suggest outpatient follow up with an endocrinologist to ensure long-term DM diet comprehension and compliance.

## 2019-10-12 NOTE — DIETITIAN INITIAL EVALUATION ADULT. - PERTINENT MEDS FT
amLODIPine   Tablet  atorvastatin  enoxaparin Injectable  glucagon  Injectable PRN  influenza   Vaccine  insulin glargine Injectable (LANTUS)  insulin lispro (HumaLOG) corrective regimen sliding scale  insulin lispro Injectable (HumaLOG)  metoprolol succinate ER  oxybutynin  valsartan

## 2019-10-12 NOTE — DIETITIAN INITIAL EVALUATION ADULT. - SIGNS/SYMPTOMS
As evidenced by decreased PO intake x1 week prior to admission As evidenced by elevated HbA1c 14% (10/11)

## 2019-10-13 ENCOUNTER — TRANSCRIPTION ENCOUNTER (OUTPATIENT)
Age: 64
End: 2019-10-13

## 2019-10-13 VITALS
OXYGEN SATURATION: 97 % | HEART RATE: 83 BPM | TEMPERATURE: 99 F | DIASTOLIC BLOOD PRESSURE: 57 MMHG | RESPIRATION RATE: 18 BRPM | SYSTOLIC BLOOD PRESSURE: 98 MMHG

## 2019-10-13 LAB
ANION GAP SERPL CALC-SCNC: 12 MMO/L — SIGNIFICANT CHANGE UP (ref 7–14)
BUN SERPL-MCNC: 11 MG/DL — SIGNIFICANT CHANGE UP (ref 7–23)
CALCIUM SERPL-MCNC: 8.4 MG/DL — SIGNIFICANT CHANGE UP (ref 8.4–10.5)
CHLORIDE SERPL-SCNC: 105 MMOL/L — SIGNIFICANT CHANGE UP (ref 98–107)
CO2 SERPL-SCNC: 23 MMOL/L — SIGNIFICANT CHANGE UP (ref 22–31)
CREAT SERPL-MCNC: 0.63 MG/DL — SIGNIFICANT CHANGE UP (ref 0.5–1.3)
GLUCOSE SERPL-MCNC: 164 MG/DL — HIGH (ref 70–99)
HCT VFR BLD CALC: 38.3 % — LOW (ref 39–50)
HGB BLD-MCNC: 12.3 G/DL — LOW (ref 13–17)
MAGNESIUM SERPL-MCNC: 1.9 MG/DL — SIGNIFICANT CHANGE UP (ref 1.6–2.6)
MCHC RBC-ENTMCNC: 25.2 PG — LOW (ref 27–34)
MCHC RBC-ENTMCNC: 32.1 % — SIGNIFICANT CHANGE UP (ref 32–36)
MCV RBC AUTO: 78.3 FL — LOW (ref 80–100)
NRBC # FLD: 0 K/UL — SIGNIFICANT CHANGE UP (ref 0–0)
PHOSPHATE SERPL-MCNC: 3.2 MG/DL — SIGNIFICANT CHANGE UP (ref 2.5–4.5)
PLATELET # BLD AUTO: 182 K/UL — SIGNIFICANT CHANGE UP (ref 150–400)
PMV BLD: 12 FL — SIGNIFICANT CHANGE UP (ref 7–13)
POTASSIUM SERPL-MCNC: 3.9 MMOL/L — SIGNIFICANT CHANGE UP (ref 3.5–5.3)
POTASSIUM SERPL-SCNC: 3.9 MMOL/L — SIGNIFICANT CHANGE UP (ref 3.5–5.3)
RBC # BLD: 4.89 M/UL — SIGNIFICANT CHANGE UP (ref 4.2–5.8)
RBC # FLD: 14.5 % — SIGNIFICANT CHANGE UP (ref 10.3–14.5)
SODIUM SERPL-SCNC: 140 MMOL/L — SIGNIFICANT CHANGE UP (ref 135–145)
WBC # BLD: 5.03 K/UL — SIGNIFICANT CHANGE UP (ref 3.8–10.5)
WBC # FLD AUTO: 5.03 K/UL — SIGNIFICANT CHANGE UP (ref 3.8–10.5)

## 2019-10-13 PROCEDURE — 99239 HOSP IP/OBS DSCHRG MGMT >30: CPT

## 2019-10-13 RX ORDER — INSULIN LISPRO 100/ML
12 VIAL (ML) SUBCUTANEOUS
Qty: 0 | Refills: 0 | DISCHARGE

## 2019-10-13 RX ORDER — PIOGLITAZONE HYDROCHLORIDE 15 MG/1
1 TABLET ORAL
Qty: 0 | Refills: 0 | DISCHARGE

## 2019-10-13 RX ORDER — SITAGLIPTIN AND METFORMIN HYDROCHLORIDE 500; 50 MG/1; MG/1
1 TABLET, FILM COATED ORAL
Qty: 0 | Refills: 0 | DISCHARGE

## 2019-10-13 RX ADMIN — ENOXAPARIN SODIUM 40 MILLIGRAM(S): 100 INJECTION SUBCUTANEOUS at 13:31

## 2019-10-13 RX ADMIN — Medication 50 MILLIGRAM(S): at 06:21

## 2019-10-13 RX ADMIN — INSULIN GLARGINE 40 UNIT(S): 100 INJECTION, SOLUTION SUBCUTANEOUS at 09:13

## 2019-10-13 RX ADMIN — VALSARTAN 320 MILLIGRAM(S): 80 TABLET ORAL at 13:26

## 2019-10-13 RX ADMIN — Medication 5 MILLIGRAM(S): at 13:26

## 2019-10-13 RX ADMIN — Medication 14 UNIT(S): at 09:13

## 2019-10-13 RX ADMIN — Medication 14 UNIT(S): at 13:25

## 2019-10-13 RX ADMIN — POLYETHYLENE GLYCOL 3350 17 GRAM(S): 17 POWDER, FOR SOLUTION ORAL at 13:30

## 2019-10-13 RX ADMIN — AMLODIPINE BESYLATE 5 MILLIGRAM(S): 2.5 TABLET ORAL at 06:21

## 2019-10-13 NOTE — PROGRESS NOTE ADULT - ATTENDING COMMENTS
Pt was seen and examined in AM. Pt feels well, no complaints.   DKA with A1c 14, gap closed   c/w insuline, monitor FS, f/u Endo for dc regimen
Pt was seen and examined in AM. Pt feels well, no complaints. eager to go home today.   DKA with A1c 14, resolved. FS is much better controlled.   Insulin dc regimen per Endo  Diabetes education provided by me at bedside.   dc planning. time 40 min
Pt was seen and examined in AM. Pt feels well, no complaints.   DKA with A1c 14, gap closed (AG 14 this AM)  c/w insuline, monitor FS, f/u Endo for dc regimen

## 2019-10-13 NOTE — DISCHARGE NOTE NURSING/CASE MANAGEMENT/SOCIAL WORK - NSDCPNINST_GEN_ALL_CORE
patient alert and clinically stable. patient vss and afebrile. pt self administered insulin noted. Diabetes Education provided. patient provided with side effects of medication. patient stable for discharged to home. written and verbal information. will continue to monitor.

## 2019-10-13 NOTE — DISCHARGE NOTE PROVIDER - CARE PROVIDER_API CALL
Babatunde Boyd)  Family Medicine  [address] [place] [address]  Phone: [phone]  Fax: [phone]  Follow Up Time: 1 week

## 2019-10-13 NOTE — PROGRESS NOTE ADULT - SUBJECTIVE AND OBJECTIVE BOX
Ilsa Louis MD  [facility] Medicine CMB  Pager: [phone] / [facility] [phone]     Patient is a 64y old  Male who presents with a chief complaint of Nausea (12 Oct 2019 08:00)      SUBJECTIVE / OVERNIGHT EVENTS:  Patient seen and examined at bedside. No acute events overnight.     Other Review of Systems Negative.    MEDICATIONS  (STANDING):  amLODIPine   Tablet 5 milliGRAM(s) Oral daily  atorvastatin 10 milliGRAM(s) Oral at bedtime  dextrose 5%. 1000 milliLiter(s) (50 mL/Hr) IV Continuous <Continuous>  dextrose 50% Injectable 12.5 Gram(s) IV Push once  dextrose 50% Injectable 25 Gram(s) IV Push once  dextrose 50% Injectable 25 Gram(s) IV Push once  enoxaparin Injectable 40 milliGRAM(s) SubCutaneous daily  influenza   Vaccine 0.5 milliLiter(s) IntraMuscular once  insulin glargine Injectable (LANTUS) 40 Unit(s) SubCutaneous two times a day  insulin lispro (HumaLOG) corrective regimen sliding scale   SubCutaneous three times a day before meals  insulin lispro (HumaLOG) corrective regimen sliding scale   SubCutaneous at bedtime  insulin lispro Injectable (HumaLOG) 14 Unit(s) SubCutaneous three times a day before meals  metoprolol succinate ER 50 milliGRAM(s) Oral daily  oxybutynin 5 milliGRAM(s) Oral daily  polyethylene glycol 3350 17 Gram(s) Oral daily  valsartan 320 milliGRAM(s) Oral daily    MEDICATIONS  (PRN):  dextrose 40% Gel 15 Gram(s) Oral once PRN Blood Glucose LESS THAN 70 milliGRAM(s)/deciliter  glucagon  Injectable 1 milliGRAM(s) IntraMuscular once PRN Glucose LESS THAN 70 milligrams/deciliter      OBJECTIVE:    Vital Signs Last 24 Hrs  T(C): 36.4 (13 Oct 2019 06:00), Max: 36.6 (12 Oct 2019 10:32)  T(F): 97.5 (13 Oct 2019 06:00), Max: 97.9 (12 Oct 2019 14:30)  HR: 81 (13 Oct 2019 06:00) (76 - 96)  BP: 143/87 (13 Oct 2019 06:00) (114/69 - 146/88)  BP(mean): --  RR: 16 (13 Oct 2019 06:00) (16 - 18)  SpO2: 98% (13 Oct 2019 06:00) (97% - 98%)    CAPILLARY BLOOD GLUCOSE      POCT Blood Glucose.: 122 mg/dL (13 Oct 2019 08:54)  POCT Blood Glucose.: 308 mg/dL (12 Oct 2019 22:02)  POCT Blood Glucose.: 219 mg/dL (12 Oct 2019 17:49)  POCT Blood Glucose.: 284 mg/dL (12 Oct 2019 12:34)  POCT Blood Glucose.: 133 mg/dL (12 Oct 2019 09:14)    I&O's Summary    12 Oct 2019 07:01  -  13 Oct 2019 07:00  --------------------------------------------------------  IN: 440 mL / OUT: 0 mL / NET: 440 mL        PHYSICAL EXAM:  GENERAL: NAD, well-developed  HEAD:  Atraumatic, Normocephalic  EYES: EOMI, PERRLA, conjunctiva and sclera clear  NECK: Supple, No JVD  CHEST/LUNG: Clear to auscultation bilaterally; No wheeze  HEART: Regular rate and rhythm; No murmurs, rubs, or gallops  ABDOMEN: Soft, Nontender, Nondistended; Bowel sounds present  EXTREMITIES:  2+ Peripheral Pulses, No clubbing, cyanosis, or edema  PSYCH: AAOx3  NEUROLOGY: non-focal  SKIN: No rashes or lesions    LABS:                        12.3   5.03  )-----------( 182      ( 13 Oct 2019 07:00 )             38.3     Auto Eosinophil # x     / Auto Eosinophil % x     / Auto Neutrophil # x     / Auto Neutrophil % x     / BANDS % x                            12.5   6.04  )-----------( 197      ( 12 Oct 2019 05:31 )             38.0     Auto Eosinophil # x     / Auto Eosinophil % x     / Auto Neutrophil # x     / Auto Neutrophil % x     / BANDS % x        10-13    140  |  105  |  11  ----------------------------<  164<H>  3.9   |  23  |  0.63  10-12    138  |  103  |  13  ----------------------------<  113<H>  3.9   |  25  |  0.65  10-11    136  |  98  |  16  ----------------------------<  245<H>  4.2   |  24  |  0.74    Ca    8.4      13 Oct 2019 07:00  Mg     1.9     10-13  Phos  3.2     10-13          Care Discussed with Consultants/Other Providers: Yes, Endocrine Ilsa Louis MD  [facility] Medicine CMB  Pager: [phone] / [facility] [phone]     Patient is a 64y old  Male who presents with a chief complaint of Nausea (12 Oct 2019 08:00)      SUBJECTIVE / OVERNIGHT EVENTS:  Patient seen and examined at bedside. No acute events overnight. Pt complains of dry cough. Otherwise no F/C/N/V/D, or abdominal pain.     Other Review of Systems Negative.    MEDICATIONS  (STANDING):  amLODIPine   Tablet 5 milliGRAM(s) Oral daily  atorvastatin 10 milliGRAM(s) Oral at bedtime  dextrose 5%. 1000 milliLiter(s) (50 mL/Hr) IV Continuous <Continuous>  dextrose 50% Injectable 12.5 Gram(s) IV Push once  dextrose 50% Injectable 25 Gram(s) IV Push once  dextrose 50% Injectable 25 Gram(s) IV Push once  enoxaparin Injectable 40 milliGRAM(s) SubCutaneous daily  influenza   Vaccine 0.5 milliLiter(s) IntraMuscular once  insulin glargine Injectable (LANTUS) 40 Unit(s) SubCutaneous two times a day  insulin lispro (HumaLOG) corrective regimen sliding scale   SubCutaneous three times a day before meals  insulin lispro (HumaLOG) corrective regimen sliding scale   SubCutaneous at bedtime  insulin lispro Injectable (HumaLOG) 14 Unit(s) SubCutaneous three times a day before meals  metoprolol succinate ER 50 milliGRAM(s) Oral daily  oxybutynin 5 milliGRAM(s) Oral daily  polyethylene glycol 3350 17 Gram(s) Oral daily  valsartan 320 milliGRAM(s) Oral daily    MEDICATIONS  (PRN):  dextrose 40% Gel 15 Gram(s) Oral once PRN Blood Glucose LESS THAN 70 milliGRAM(s)/deciliter  glucagon  Injectable 1 milliGRAM(s) IntraMuscular once PRN Glucose LESS THAN 70 milligrams/deciliter      OBJECTIVE:    Vital Signs Last 24 Hrs  T(C): 36.4 (13 Oct 2019 06:00), Max: 36.6 (12 Oct 2019 10:32)  T(F): 97.5 (13 Oct 2019 06:00), Max: 97.9 (12 Oct 2019 14:30)  HR: 81 (13 Oct 2019 06:00) (76 - 96)  BP: 143/87 (13 Oct 2019 06:00) (114/69 - 146/88)  BP(mean): --  RR: 16 (13 Oct 2019 06:00) (16 - 18)  SpO2: 98% (13 Oct 2019 06:00) (97% - 98%)    CAPILLARY BLOOD GLUCOSE      POCT Blood Glucose.: 122 mg/dL (13 Oct 2019 08:54)  POCT Blood Glucose.: 308 mg/dL (12 Oct 2019 22:02)  POCT Blood Glucose.: 219 mg/dL (12 Oct 2019 17:49)  POCT Blood Glucose.: 284 mg/dL (12 Oct 2019 12:34)  POCT Blood Glucose.: 133 mg/dL (12 Oct 2019 09:14)    I&O's Summary    12 Oct 2019 07:01  -  13 Oct 2019 07:00  --------------------------------------------------------  IN: 440 mL / OUT: 0 mL / NET: 440 mL        PHYSICAL EXAM:  GENERAL: NAD, well-developed  HEAD:  Atraumatic, Normocephalic  EYES: EOMI, PERRLA, conjunctiva and sclera clear  NECK: Supple, No JVD  CHEST/LUNG: Clear to auscultation bilaterally; No wheeze  HEART: Regular rate and rhythm; No murmurs, rubs, or gallops  ABDOMEN: Soft, Nontender, Nondistended; Bowel sounds present  EXTREMITIES:  2+ Peripheral Pulses, No clubbing, cyanosis, or edema  PSYCH: AAOx3  NEUROLOGY: non-focal  SKIN: No rashes or lesions    LABS:                                12.3   5.03  )-----------( 182      ( 13 Oct 2019 07:00 )             38.3     10-13    140  |  105  |  11  ----------------------------<  164<H>  3.9   |  23  |  0.63    Ca    8.4      13 Oct 2019 07:00  Phos  3.2     10-13  Mg     1.9     10-13      Care Discussed with Consultants/Other Providers: Yes, Endocrine

## 2019-10-13 NOTE — PROGRESS NOTE ADULT - PROBLEM SELECTOR PLAN 1
Pt found to have BG of 739, AG 24, K 4.1, pH 7.27, BHB 4.9, pH=7.27; received 3L IVF and was started on NS at 100cc/hr, his MR FS <250, then to D5/1/2NS at 200cc/hr, transitioned to lantus 50 BID  -DKA likely due to possible viral gastroenteritis and non-compliance with DM regimen, A1c 14  -Now on Lantus 40u BID, pre-meal 14 TID, moderate correction scale  - BMPs with closed AG, stable  - latest FS: 122 AM, 308 bedtime   -DM education  -DM diet  -at home on: basaglar 40U BID, Janumet 50mg-1000mg, Humalog 12U BID, glipizide 10mg daily, pioglitazone 45mg daily  -Dietician consulted

## 2019-10-13 NOTE — DISCHARGE NOTE NURSING/CASE MANAGEMENT/SOCIAL WORK - NSDCFUADDAPPT_GEN_ALL_CORE_FT
Please follow up with Endocrine regarding your diabetes in 1 week. Please call [phone] to set up an appointment.     Please follow up with your PCP Dr. Boyd in 1 week. Please call [phone] to set up an appointment.

## 2019-10-13 NOTE — DISCHARGE NOTE PROVIDER - NSDCCPCAREPLAN_GEN_ALL_CORE_FT
PRINCIPAL DISCHARGE DIAGNOSIS  Diagnosis: Diabetic ketoacidosis  Assessment and Plan of Treatment: You came in with nausea, vomiting, increased thirst, increased urination, and decreased appetite. You were found to have an elevated glucose to 739 with other electrolyte abnormalities. You were treated with fluids and an insulin drip. You were seen by Endocrine, who started you on Lantus 40 U twice a day and Humalog 14 U with meals. It is very important that you continue to take these medications at home and carefully monitor your sugar levels. You were also seen by the dietician. Please follow up with Endocrinology in 1 week for further management of your diabetes.  [facility] Endocrinology  Endocrinology  [address] [place] [address]  Phone: [phone]  Babatunde Boyd)  Family Medicine  [address] [place] [address]  Phone: [phone] PRINCIPAL DISCHARGE DIAGNOSIS  Diagnosis: Diabetic ketoacidosis  Assessment and Plan of Treatment: You came in with nausea, vomiting, increased thirst, increased urination, and decreased appetite. You were found to have an elevated glucose to 739 with other electrolyte abnormalities. You were treated with fluids and an insulin drip. You were seen by Endocrine, who started you on Lantus 40 U twice a day and Humalog 14 U three times a day with meals. It is very important that you continue to take these medications at home and carefully monitor your sugar levels. Please STOP taking the oral meds glipizide, Janumet, and pioglitazone. Please follow up with Endocrinology in 1 week for further management of your diabetes.  [facility] Endocrinology  Endocrinology  [address] [place] [address]  Phone: [phone]  Babatunde Boyd)  Family Medicine  [address] [place] [address]  Phone: [phone]

## 2019-10-13 NOTE — DISCHARGE NOTE PROVIDER - NSDCFUADDAPPT_GEN_ALL_CORE_FT
Please follow up with Endocrine regarding your diabetes in 1 week. Please call [phone] to set up an appointment.     Please follow up with your PCP Dr. Boyd in 1 week. Please call [phone] to set up an appointment.

## 2019-10-13 NOTE — PROGRESS NOTE ADULT - PROBLEM SELECTOR PLAN 2
Due to hyperglycemia in 700s and glycosuria;   BUN: Scr ratio 22:0.8 initially; currently 11:0.63 improved with IVF  - s/p 3L NS  - Monitoring electrolytes

## 2019-10-13 NOTE — DISCHARGE NOTE PROVIDER - NSFOLLOWUPCLINICS_GEN_ALL_ED_FT
[facility] Endocrinology  Endocrinology  [address] [place] [address]  Phone: [phone]  Fax:   Follow Up Time: 7-10 Days

## 2019-10-13 NOTE — PROGRESS NOTE ADULT - PROBLEM SELECTOR PLAN 6
-at home on valsartan 320mg, amlodipine 5mg, and metoprolol succinate 50mg   -c/w home medications
General

## 2019-10-13 NOTE — DISCHARGE NOTE PROVIDER - HOSPITAL COURSE
The pt is a 65yo male (Sherice-speaking) with Type 2 DM (on insulin, Janumet, glipizide, pioglitazone), HTN, and HLD who presented with nausea and decreased appetite for the past 1 wk. Pt reports 1 wk ago, he started becoming nauseous after eating or drinking. He would eat food or drink water and then a few minutes later would become nauseous and vomit (NBNB). Because he was not eating, he stopped taking his all of his medications since last Monday 09/30.  Pt went to his PCP regarding his symptoms and was prescribed famotidine 20mg, senna, Colace, and Tylenol without any improvement.  Denied any abdominal pain, diarrhea, has constipation (last bowel movement while in the ED).  Denied any recent fevers, chills, night sweats, URI, dysuria.  Does endorse increase in frequency of urination.  Also endorsed early satiety, generalized weakness, and excessive thirst. In the ED, VS: , 98.2, /102, 100% on RA. Found to have glucose = 739, AG 24, K 4.1, pH 7.27, BHB 4.9, c/w DKA. He received 3L NS followed by NS + 20 mEq KCL @ 100 mL/hr increased to 150cc/hr, and started on an insulin gtt @ 8U/hr, currently at 4U/hr.  Pt was admitted for DKA. Once his fingerstick was <250, pt was switched from NS to D5/1/2 NS maintenance fluids, and then transitioned to lantus 50 U BID. Pt was seen by Endocrine and started on Lantus 40 U BID and pre-meal 14 TID. BMPs were stable with a closed gap. Hyperglycemia resolved. Pt received diabetes education and was seen by a dietician. Endocrine recommended that pt be discharged on Lantus 40 U BID and Humalog 14 U w/ meals. His home janumet, glipizide, and pioglitazone were discontinued. Pt had pseudohyponatremia in the setting of hyperglycemia; this resolved with better glucose control. Pt was medically stable for discharge and advised to follow up with Endocrine and his PCP in 1 week.

## 2019-10-13 NOTE — DISCHARGE NOTE NURSING/CASE MANAGEMENT/SOCIAL WORK - PATIENT PORTAL LINK FT
You can access the FollowMyHealth Patient Portal offered by [facility] by registering at the following website: [email or web address]. By joining [employer]’s FollowMyHealth portal, you will also be able to view your health information using other applications (apps) compatible with our system.

## 2019-10-13 NOTE — PROGRESS NOTE ADULT - ASSESSMENT
63yo male (Sherice-speaking) with T2DM (on insulin, Janumet, glipizide, pioglitazone), HTN, and HLD presenting with nausea and decreased appetite for the past 1 wk found to be in DKA c/b dehydration and pseudohyponatremia; 63yo male (Sherice-speaking) with T2DM (on insulin, Janumet, glipizide, pioglitazone), HTN, and HLD presenting with nausea and decreased appetite for the past 1 wk found to be in DKA c/b dehydration and pseudohyponatremia.

## 2019-10-14 DIAGNOSIS — Z71.89 OTHER SPECIFIED COUNSELING: ICD-10-CM

## 2019-10-30 PROBLEM — E78.5 HYPERLIPIDEMIA, UNSPECIFIED: Chronic | Status: ACTIVE | Noted: 2019-10-10

## 2019-10-30 PROBLEM — E11.9 TYPE 2 DIABETES MELLITUS WITHOUT COMPLICATIONS: Chronic | Status: ACTIVE | Noted: 2019-10-10

## 2019-12-11 PROBLEM — Z00.00 ENCOUNTER FOR PREVENTIVE HEALTH EXAMINATION: Status: ACTIVE | Noted: 2019-12-11

## 2019-12-16 ENCOUNTER — APPOINTMENT (OUTPATIENT)
Dept: ENDOCRINOLOGY | Facility: CLINIC | Age: 64
End: 2019-12-16
Payer: MEDICAID

## 2019-12-16 PROCEDURE — G0108 DIAB MANAGE TRN  PER INDIV: CPT

## 2019-12-16 PROCEDURE — 95251 CONT GLUC MNTR ANALYSIS I&R: CPT

## 2019-12-16 PROCEDURE — 95250 CONT GLUC MNTR PHYS/QHP EQP: CPT

## 2019-12-31 LAB — HBA1C MFR BLD HPLC: 14

## 2020-01-02 ENCOUNTER — CLINICAL ADVICE (OUTPATIENT)
Age: 65
End: 2020-01-02

## 2020-01-21 ENCOUNTER — EMERGENCY (EMERGENCY)
Facility: HOSPITAL | Age: 65
LOS: 1 days | Discharge: HOME | End: 2020-01-21
Attending: EMERGENCY MEDICINE | Admitting: EMERGENCY MEDICINE
Payer: MEDICAID

## 2020-01-21 VITALS
TEMPERATURE: 98 F | RESPIRATION RATE: 17 BRPM | SYSTOLIC BLOOD PRESSURE: 145 MMHG | OXYGEN SATURATION: 99 % | DIASTOLIC BLOOD PRESSURE: 98 MMHG | HEART RATE: 80 BPM

## 2020-01-21 VITALS
TEMPERATURE: 99 F | RESPIRATION RATE: 18 BRPM | OXYGEN SATURATION: 100 % | SYSTOLIC BLOOD PRESSURE: 170 MMHG | HEART RATE: 93 BPM | DIASTOLIC BLOOD PRESSURE: 100 MMHG

## 2020-01-21 LAB
ALBUMIN SERPL ELPH-MCNC: 4.6 G/DL — SIGNIFICANT CHANGE UP (ref 3.3–5)
ALP SERPL-CCNC: 122 U/L — HIGH (ref 40–120)
ALT FLD-CCNC: 25 U/L — SIGNIFICANT CHANGE UP (ref 4–41)
ANION GAP SERPL CALC-SCNC: 13 MMO/L — SIGNIFICANT CHANGE UP (ref 7–14)
APPEARANCE UR: CLEAR — SIGNIFICANT CHANGE UP
AST SERPL-CCNC: 23 U/L — SIGNIFICANT CHANGE UP (ref 4–40)
B-OH-BUTYR SERPL-SCNC: < 0 MMOL/L — LOW (ref 0–0.4)
BACTERIA # UR AUTO: NEGATIVE — SIGNIFICANT CHANGE UP
BASE EXCESS BLDV CALC-SCNC: 0.9 MMOL/L — SIGNIFICANT CHANGE UP
BASOPHILS # BLD AUTO: 0.07 K/UL — SIGNIFICANT CHANGE UP (ref 0–0.2)
BASOPHILS NFR BLD AUTO: 0.9 % — SIGNIFICANT CHANGE UP (ref 0–2)
BILIRUB SERPL-MCNC: 0.3 MG/DL — SIGNIFICANT CHANGE UP (ref 0.2–1.2)
BILIRUB UR-MCNC: NEGATIVE — SIGNIFICANT CHANGE UP
BLOOD GAS VENOUS - CREATININE: 0.73 MG/DL — SIGNIFICANT CHANGE UP (ref 0.5–1.3)
BLOOD UR QL VISUAL: SIGNIFICANT CHANGE UP
BUN SERPL-MCNC: 18 MG/DL — SIGNIFICANT CHANGE UP (ref 7–23)
CALCIUM SERPL-MCNC: 10.2 MG/DL — SIGNIFICANT CHANGE UP (ref 8.4–10.5)
CHLORIDE BLDV-SCNC: 101 MMOL/L — SIGNIFICANT CHANGE UP (ref 96–108)
CHLORIDE SERPL-SCNC: 97 MMOL/L — LOW (ref 98–107)
CO2 SERPL-SCNC: 23 MMOL/L — SIGNIFICANT CHANGE UP (ref 22–31)
COLOR SPEC: SIGNIFICANT CHANGE UP
CREAT SERPL-MCNC: 0.78 MG/DL — SIGNIFICANT CHANGE UP (ref 0.5–1.3)
EOSINOPHIL # BLD AUTO: 0.14 K/UL — SIGNIFICANT CHANGE UP (ref 0–0.5)
EOSINOPHIL NFR BLD AUTO: 1.8 % — SIGNIFICANT CHANGE UP (ref 0–6)
GAS PNL BLDV: 138 MMOL/L — SIGNIFICANT CHANGE UP (ref 136–146)
GLUCOSE BLDV-MCNC: 198 MG/DL — HIGH (ref 70–99)
GLUCOSE SERPL-MCNC: 195 MG/DL — HIGH (ref 70–99)
GLUCOSE UR-MCNC: >1000 — HIGH
HCO3 BLDV-SCNC: 24 MMOL/L — SIGNIFICANT CHANGE UP (ref 20–27)
HCT VFR BLD CALC: 44.3 % — SIGNIFICANT CHANGE UP (ref 39–50)
HCT VFR BLDV CALC: 45.6 % — SIGNIFICANT CHANGE UP (ref 39–51)
HGB BLD-MCNC: 14.5 G/DL — SIGNIFICANT CHANGE UP (ref 13–17)
HGB BLDV-MCNC: 14.9 G/DL — SIGNIFICANT CHANGE UP (ref 13–17)
HYALINE CASTS # UR AUTO: NEGATIVE — SIGNIFICANT CHANGE UP
IMM GRANULOCYTES NFR BLD AUTO: 0.5 % — SIGNIFICANT CHANGE UP (ref 0–1.5)
KETONES UR-MCNC: NEGATIVE — SIGNIFICANT CHANGE UP
LACTATE BLDV-MCNC: 2.1 MMOL/L — HIGH (ref 0.5–2)
LEUKOCYTE ESTERASE UR-ACNC: NEGATIVE — SIGNIFICANT CHANGE UP
LYMPHOCYTES # BLD AUTO: 1.55 K/UL — SIGNIFICANT CHANGE UP (ref 1–3.3)
LYMPHOCYTES # BLD AUTO: 19.6 % — SIGNIFICANT CHANGE UP (ref 13–44)
MAGNESIUM SERPL-MCNC: 2.1 MG/DL — SIGNIFICANT CHANGE UP (ref 1.6–2.6)
MCHC RBC-ENTMCNC: 25.5 PG — LOW (ref 27–34)
MCHC RBC-ENTMCNC: 32.7 % — SIGNIFICANT CHANGE UP (ref 32–36)
MCV RBC AUTO: 77.9 FL — LOW (ref 80–100)
MONOCYTES # BLD AUTO: 0.66 K/UL — SIGNIFICANT CHANGE UP (ref 0–0.9)
MONOCYTES NFR BLD AUTO: 8.3 % — SIGNIFICANT CHANGE UP (ref 2–14)
NEUTROPHILS # BLD AUTO: 5.45 K/UL — SIGNIFICANT CHANGE UP (ref 1.8–7.4)
NEUTROPHILS NFR BLD AUTO: 68.9 % — SIGNIFICANT CHANGE UP (ref 43–77)
NITRITE UR-MCNC: NEGATIVE — SIGNIFICANT CHANGE UP
NRBC # FLD: 0 K/UL — SIGNIFICANT CHANGE UP (ref 0–0)
PCO2 BLDV: 52 MMHG — HIGH (ref 41–51)
PH BLDV: 7.32 PH — SIGNIFICANT CHANGE UP (ref 7.32–7.43)
PH UR: 6 — SIGNIFICANT CHANGE UP (ref 5–8)
PHOSPHATE SERPL-MCNC: 4 MG/DL — SIGNIFICANT CHANGE UP (ref 2.5–4.5)
PLATELET # BLD AUTO: 268 K/UL — SIGNIFICANT CHANGE UP (ref 150–400)
PMV BLD: 10.7 FL — SIGNIFICANT CHANGE UP (ref 7–13)
PO2 BLDV: 36 MMHG — SIGNIFICANT CHANGE UP (ref 35–40)
POTASSIUM BLDV-SCNC: 4.2 MMOL/L — SIGNIFICANT CHANGE UP (ref 3.4–4.5)
POTASSIUM SERPL-MCNC: 4.4 MMOL/L — SIGNIFICANT CHANGE UP (ref 3.5–5.3)
POTASSIUM SERPL-SCNC: 4.4 MMOL/L — SIGNIFICANT CHANGE UP (ref 3.5–5.3)
PROT SERPL-MCNC: 8.7 G/DL — HIGH (ref 6–8.3)
PROT UR-MCNC: 200 — HIGH
RBC # BLD: 5.69 M/UL — SIGNIFICANT CHANGE UP (ref 4.2–5.8)
RBC # FLD: 13.5 % — SIGNIFICANT CHANGE UP (ref 10.3–14.5)
RBC CASTS # UR COMP ASSIST: HIGH (ref 0–?)
SAO2 % BLDV: 63.8 % — SIGNIFICANT CHANGE UP (ref 60–85)
SODIUM SERPL-SCNC: 133 MMOL/L — LOW (ref 135–145)
SP GR SPEC: 1.02 — SIGNIFICANT CHANGE UP (ref 1–1.04)
SQUAMOUS # UR AUTO: SIGNIFICANT CHANGE UP
UROBILINOGEN FLD QL: NORMAL — SIGNIFICANT CHANGE UP
WBC # BLD: 7.91 K/UL — SIGNIFICANT CHANGE UP (ref 3.8–10.5)
WBC # FLD AUTO: 7.91 K/UL — SIGNIFICANT CHANGE UP (ref 3.8–10.5)
WBC UR QL: SIGNIFICANT CHANGE UP (ref 0–?)

## 2020-01-21 PROCEDURE — 99284 EMERGENCY DEPT VISIT MOD MDM: CPT

## 2020-01-21 RX ORDER — IBUPROFEN 200 MG
600 TABLET ORAL ONCE
Refills: 0 | Status: COMPLETED | OUTPATIENT
Start: 2020-01-21 | End: 2020-01-21

## 2020-01-21 RX ORDER — ACETAMINOPHEN 500 MG
650 TABLET ORAL ONCE
Refills: 0 | Status: COMPLETED | OUTPATIENT
Start: 2020-01-21 | End: 2020-01-21

## 2020-01-21 RX ORDER — FAMOTIDINE 10 MG/ML
20 INJECTION INTRAVENOUS ONCE
Refills: 0 | Status: COMPLETED | OUTPATIENT
Start: 2020-01-21 | End: 2020-01-21

## 2020-01-21 RX ORDER — FAMOTIDINE 10 MG/ML
20 INJECTION INTRAVENOUS ONCE
Refills: 0 | Status: DISCONTINUED | OUTPATIENT
Start: 2020-01-21 | End: 2020-01-21

## 2020-01-21 RX ORDER — SODIUM CHLORIDE 9 MG/ML
1000 INJECTION INTRAMUSCULAR; INTRAVENOUS; SUBCUTANEOUS ONCE
Refills: 0 | Status: COMPLETED | OUTPATIENT
Start: 2020-01-21 | End: 2020-01-21

## 2020-01-21 RX ADMIN — Medication 650 MILLIGRAM(S): at 17:03

## 2020-01-21 RX ADMIN — SODIUM CHLORIDE 1500 MILLILITER(S): 9 INJECTION INTRAMUSCULAR; INTRAVENOUS; SUBCUTANEOUS at 17:03

## 2020-01-21 RX ADMIN — Medication 600 MILLIGRAM(S): at 18:57

## 2020-01-21 RX ADMIN — Medication 600 MILLIGRAM(S): at 17:03

## 2020-01-21 RX ADMIN — Medication 30 MILLILITER(S): at 18:57

## 2020-01-21 RX ADMIN — FAMOTIDINE 20 MILLIGRAM(S): 10 INJECTION INTRAVENOUS at 18:57

## 2020-01-21 RX ADMIN — Medication 650 MILLIGRAM(S): at 18:57

## 2020-01-21 NOTE — ED PROVIDER NOTE - OBJECTIVE STATEMENT
64yom with pmhx of HTN, and IDDM, presents to ED with LUQ abdominal pain, non radiating, dull, unassociated with food.  Pain began on Saturday after sneezing really hard 3 times, no relief with ibuprofen or bowel movements. Denies chest pain, SOB, fevers, chills, n/v/d/c, urinary symptoms. Pt also states he had a recent injection L shoulder by his PMD for pain on Saturday.

## 2020-01-21 NOTE — ED PROVIDER NOTE - PATIENT PORTAL LINK FT
You can access the FollowMyHealth Patient Portal offered by [facility] by registering at the following website: [email or web address]. By joining [employer]’s FollowMyHealth portal, you will also be able to view your health information using other applications (apps) compatible with our system.

## 2020-01-21 NOTE — ED PROVIDER NOTE - NS ED ROS FT
Constitutional: no fevers, no chills.  Eyes: no visual changes.  Ears: no ear drainage, no ear pain.  Nose: no nasal congestion.  Mouth/Throat: no sore throat.  Cardiovascular: no chest pain.  Respiratory: no shortness of breath, no wheezing, no cough  Gastrointestinal: no nausea, no vomiting, no diarrhea, +abdominal pain.  MSK: no flank pain, no back pain.  Genitourinary: no dysuria, no hematuria.  Skin: no rashes.  Neuro: no headache,   Psychiatric: no known mental health issues.

## 2020-01-21 NOTE — ED PROVIDER NOTE - NSFOLLOWUPINSTRUCTIONS_ED_ALL_ED_FT
1) Follow up with urology within 1 week because you have blood in your urine/   2) Return to the ER immediately for new or worsening symptoms especially fever > 100.4 F, vomiting, or uncontrolled pain.  3) Take motrin 600 mg every 6 hours for mild to moderate pain. Drink plenty of water until urine appears clear.

## 2020-01-21 NOTE — ED PROVIDER NOTE - RAPID ASSESSMENT
64yom with pmhx of HTN, and IDDM, presents to ED with abdominal pain. Pain is in left upper quadrant radiating to the flank. Pain began on Saturday, no relief with ibuprofen or bowel movements. Denies chest pain, SOB, fevers, chills. Pt also states he had a recent injection by his PMD for pain. 64yom with pmhx of HTN, and IDDM, presents to ED with abdominal pain. Pain is in left upper quadrant radiating to the flank. Pain began on Saturday, no relief with ibuprofen or bowel movements. Denies chest pain, SOB, fevers, chills. Pt also states he had a recent injection by his PMD for pain.  I, Dr. Yun, personally performed the service described in the documentation recorded by the scribe in my presence, and it accurately and completely records my words and actions.

## 2020-01-21 NOTE — ED PROVIDER NOTE - NSFOLLOWUPCLINICS_GEN_ALL_ED_FT
[facility]  Urology  [address] [place] [address] - [address] [place] [address]  Phone: [phone]  Fax:   Follow Up Time:

## 2020-01-21 NOTE — ED PROVIDER NOTE - CLINICAL SUMMARY MEDICAL DECISION MAKING FREE TEXT BOX
64yom with pmhx of HTN, and IDDM, presents to ED with LUQ abdominal pain, non radiating, dull, unassociated with food.  Pain began on Saturday after sneezing really hard 3 times, no relief with ibuprofen or bowel movements. Denies chest pain, SOB, fevers, chills, n/v/d/c, urinary symptoms. Pt also states he had a recent injection L shoulder by his PMD for pain on Saturday. likely MSK but will get FS to see if sugars above 250, pain control and reassess. check urine Rosario att: 63 yo M with pmhx of HTN, and IDDM, presents to ED with LUQ abdominal pain, non radiating, dull, unassociated with food.  Pain began on Saturday after sneezing really hard 3 times, no relief with ibuprofen or bowel movements. No palpable masses or hernias on exam. Denies chest pain, SOB, fevers, chills, n/v/d/c, urinary symptoms. Pt also states he had a recent injection L shoulder by his PMD for pain on Saturday. likely MSK but will get FS to see if sugars above 250, pain control and reassess. check urine

## 2020-01-21 NOTE — ED ADULT NURSE NOTE - NSIMPLEMENTINTERV_GEN_ALL_ED
Implemented All Universal Safety Interventions:  [place] to call system. Call bell, personal items and telephone within reach. Instruct patient to call for assistance. Room bathroom lighting operational. Non-slip footwear when patient is off stretcher. Physically safe environment: no spills, clutter or unnecessary equipment. Stretcher in lowest position, wheels locked, appropriate side rails in place.

## 2020-01-21 NOTE — ED PROVIDER NOTE - PROGRESS NOTE DETAILS
Dr Isaacs: bedside sono shows no hydro. Pt informed he must follow urology to rule out prostate cancer vs kidney stone since has blood in urine.

## 2020-01-21 NOTE — ED ADULT NURSE NOTE - OBJECTIVE STATEMENT
intake rn: 63 yo male, pmh dm, c/o LLQ pain x 4 days. pt reports he was sneezing a lot Saturday morning and then pain followed. pt reports last BM was today. pt denies n/v/d, dysuria, hematuria, chest pain, sob. pt abdomen large, rounded, this is baseline as per pt. 22g iv insert to right hand, labs sent. pt medicated as ordered. NS infusing well. pt has bilateral  arm scabbing secondary to itching.

## 2020-01-21 NOTE — ED ADULT NURSE NOTE - NS ED NOTE  TALK SOMEONE YN
No Crescentic Advancement Flap Text: The defect edges were debeveled with a #15 scalpel blade.  Given the location of the defect and the proximity to free margins a crescentic advancement flap was deemed most appropriate.  Using a sterile surgical marker, the appropriate advancement flap was drawn incorporating the defect and placing the expected incisions within the relaxed skin tension lines where possible.    The area thus outlined was incised deep to adipose tissue with a #15 scalpel blade.  The skin margins were undermined to an appropriate distance in all directions utilizing iris scissors.

## 2020-01-27 ENCOUNTER — FORM ENCOUNTER (OUTPATIENT)
Age: 65
End: 2020-01-27

## 2020-01-28 ENCOUNTER — APPOINTMENT (OUTPATIENT)
Dept: CT IMAGING | Facility: IMAGING CENTER | Age: 65
End: 2020-01-28
Payer: MEDICAID

## 2020-01-28 ENCOUNTER — OUTPATIENT (OUTPATIENT)
Dept: OUTPATIENT SERVICES | Facility: HOSPITAL | Age: 65
LOS: 1 days | End: 2020-01-28
Payer: MEDICAID

## 2020-01-28 ENCOUNTER — APPOINTMENT (OUTPATIENT)
Dept: UROLOGY | Facility: CLINIC | Age: 65
End: 2020-01-28
Payer: MEDICAID

## 2020-01-28 DIAGNOSIS — R10.32 LEFT LOWER QUADRANT PAIN: ICD-10-CM

## 2020-01-28 DIAGNOSIS — Z78.9 OTHER SPECIFIED HEALTH STATUS: ICD-10-CM

## 2020-01-28 PROCEDURE — 74178 CT ABD&PLV WO CNTR FLWD CNTR: CPT

## 2020-01-28 PROCEDURE — 74178 CT ABD&PLV WO CNTR FLWD CNTR: CPT | Mod: 26

## 2020-01-28 PROCEDURE — 99203 OFFICE O/P NEW LOW 30 MIN: CPT

## 2020-01-28 NOTE — REVIEW OF SYSTEMS
[Dry Eyes] : dryness of the eyes [Eyesight Problems] : eyesight problems [Sore Throat] : sore throat [Blood in urine that you can see] : blood visible in urine [Negative] : Heme/Lymph

## 2020-01-29 PROBLEM — Z78.9 NON-SMOKER: Status: ACTIVE | Noted: 2020-01-29

## 2020-01-29 NOTE — ASSESSMENT
[FreeTextEntry1] : Ct scan notes NO stones or hydronephrosis - he has a horseshoe kideny\par i don’t think pain from a  issue - needs GI work up\par will repeat UA - if >5 needs cystoscopy

## 2020-01-29 NOTE — HISTORY OF PRESENT ILLNESS
[FreeTextEntry1] : Patent presents for evaluation of some left sided anterior abdominal pain; it has been present for ~2 weeks and is worsened with coughing or straining to move his bowels. No similar pain before. His PCP noted 11-25 RBCs/HPF in his urine. Never had same before and never gross hematuria. No h/o renal stones ot UTIs. no significant LUTs.\par never smoked.

## 2020-01-29 NOTE — PHYSICAL EXAM
[General Appearance - Well Developed] : well developed [General Appearance - Well Nourished] : well nourished [Exaggerated Use Of Accessory Muscles For Inspiration] : no accessory muscle use [Edema] : no peripheral edema [Abdomen Mass (___ Cm)] : no abdominal mass palpated [Abdomen Soft] : soft [Normal Station and Gait] : the gait and station were normal for the patient's age [Abdomen Hernia] : no hernia was discovered [] : no rash [No Focal Deficits] : no focal deficits [Oriented To Time, Place, And Person] : oriented to person, place, and time [FreeTextEntry1] : some tenderness left Upper quadrant

## 2020-01-30 LAB
APPEARANCE: CLEAR
BACTERIA: NEGATIVE
BILIRUBIN URINE: NEGATIVE
BLOOD URINE: NEGATIVE
COLOR: YELLOW
GLUCOSE QUALITATIVE U: ABNORMAL
HYALINE CASTS: 1 /LPF
KETONES URINE: NEGATIVE
LEUKOCYTE ESTERASE URINE: NEGATIVE
MICROSCOPIC-UA: NORMAL
NITRITE URINE: NEGATIVE
PH URINE: 6
PROTEIN URINE: ABNORMAL
RED BLOOD CELLS URINE: 2 /HPF
SPECIFIC GRAVITY URINE: 1.03
SQUAMOUS EPITHELIAL CELLS: 1 /HPF
UROBILINOGEN URINE: NORMAL
WHITE BLOOD CELLS URINE: 2 /HPF

## 2020-02-10 ENCOUNTER — APPOINTMENT (OUTPATIENT)
Dept: ENDOCRINOLOGY | Facility: CLINIC | Age: 65
End: 2020-02-10
Payer: MEDICAID

## 2020-02-10 VITALS
DIASTOLIC BLOOD PRESSURE: 80 MMHG | OXYGEN SATURATION: 98 % | HEIGHT: 63 IN | HEART RATE: 94 BPM | SYSTOLIC BLOOD PRESSURE: 120 MMHG | BODY MASS INDEX: 32.78 KG/M2 | WEIGHT: 185 LBS

## 2020-02-10 DIAGNOSIS — Q63.1 LOBULATED, FUSED AND HORSESHOE KIDNEY: ICD-10-CM

## 2020-02-10 PROCEDURE — 99205 OFFICE O/P NEW HI 60 MIN: CPT

## 2020-02-10 RX ORDER — METOPROLOL TARTRATE 75 MG/1
TABLET, FILM COATED ORAL
Refills: 0 | Status: ACTIVE | COMMUNITY

## 2020-02-10 RX ORDER — PRAVASTATIN SODIUM 10 MG/1
10 TABLET ORAL
Refills: 0 | Status: DISCONTINUED | COMMUNITY
End: 2020-02-10

## 2020-02-10 NOTE — CONSULT LETTER
[Consult Letter:] : I had the pleasure of evaluating your patient, [unfilled]. [Dear  ___] : Dear  [unfilled], [Please see my note below.] : Please see my note below. [Consult Closing:] : Thank you very much for allowing me to participate in the care of this patient.  If you have any questions, please do not hesitate to contact me. [Sincerely,] : Sincerely, [FreeTextEntry2] : DR. BUFFY MCGREGOR [FreeTextEntry3] : Eliecer Spencer MD\par

## 2020-02-10 NOTE — DATA REVIEWED
[FreeTextEntry1] : SEE  LAB RESULTS \par \par 2/20/2020\par GLUCOSE 189 \par ALK PHOS 131\par CHOLESTEROL 222\par TRIGLYCERDIE 268\par \par MICROALBUMIN 899 \par VITAMIN D 25 - 14.3\par \par SODIUM 137\par POTASSIUM 4.9\par CHLORIDE 100\par CO2 191\par BUN 15\par CREATININE 0.79\par ALK PHOS 131\par ALT 27\par AST 23\par EGFR 106\par CHOLESTEROL 222\par A1C 115.3 (11/3/2018)\par

## 2020-02-10 NOTE — HISTORY OF PRESENT ILLNESS
[FreeTextEntry1] : ABI MAC is a 64 year old male with PMHx of HTN, HLD, TYPE 2 DM presents for an initial evaluation of Type 2 diabetes mellitus.  The patient was initially diagnosed with Type 2 diabetes mellitus 10 plus years ago. \par \par His initial DM regimen was: Janumet  mg bid, Actos 45 mg daily \par His current DM regimen is: insulin Lantus 50 bid and Humalog 16/16/14\par \par He reports there is diabetic retinopathy, neuropathy no known nephropathy (EGFR >100) but high microalbumin level.  \par He reports no cardiovascular risk factors, denies any history of CAD, CHF or CVA.  \par \par Current diabetes symptoms/problems include: \par Patient reports adherence to prescribed medical regimen. \par Patient reports adherence to prescribed physical activity and dietary. \par \par The following is a sample of patient’s daily diet/routine:\par 24 Hour Nutritional Intake:\par B: bread- 2 slices and eggs and tea with sugar \par L: sometimes- fruits and some bread\par D: roti, chaptai, salad, chicken and vegetables\par Drinks: water, no juice, soda (once a month) \par snacks: pretzels \par Checks blood sugar levels: he checks about once or twice daily so far  \par Average blood sugar BEFORE meals: 150-200 \par Average blood sugar AFTER meals: 300  \par \par States that he is only using long acting insulin twice daily and Admelog with dinner only as he doesn't eat much for lunch. \par \par In regards to His DM health maintenance: \par Last visit to ophthalmology was: Date - up to date \par Last visit to podiatry was: Date - no issue, foot exam 02/10/2020 \par The patient is adherent to diabetic foot precautions:Yes\par Last A1c was: 15.3% (nov 2019), A1C pending from 2/6/2020 labs. \par Last LDL was: 116 mg/dl \par Last urine micro-albumin was: 2/3/2020  899.3 \par \par Regarding hypertension: currently taking Valsartan 320mg once daily, Amlodipine 5mg daily, and Metoprolol 50mg \par \par Regarding hyperlipidemia, currently taking pravastatin 20mg daily, does not have any side effects. \par

## 2020-02-10 NOTE — ASSESSMENT
[Carbohydrate Consistent Diet] : carbohydrate consistent diet [Hypoglycemia Management] : hypoglycemia management [Diabetes Foot Care] : diabetes foot care [Long Term Vascular Complications] : long term vascular complications of diabetes [Insulin Self-Administration] : insulin self-administration [Action and use of Insulin] : action and use of short and long-acting insulin [Self Monitoring of Blood Glucose] : self monitoring of blood glucose [Injection Technique, Storage, Sharps Disposal] : injection technique, storage, and sharps disposal [Retinopathy Screening] : Patient was referred to ophthalmology for retinopathy screening [FreeTextEntry1] : Mr. ABI MAC is a 64 year old M here for evaluation and treatment of diabetes mellitus, Type \par \par #1 Diabetes, type 2 \par - Would start Trulicity 0.75MG ONCE A WEEK\par - Continuue with Basaglar 50 units bid.  \par - States he's eating one meal a day therefore only using Humalog 16 units with dinner. \par - For lunch, asked patient to check glucose 2 hours after lunch, if glucose elevated, it is important that he takes Huamlog, maybe 8 units if eating something without cabs.  \par - Discussed signs and symptoms of acute pancreatitis, discussed that GLP1 Receptor Agonist  is contraindicated in patients with a personal or family history of MTC or in patients with MEN 2, and in patients with a prior serious hypersensitivity reaction to GLP1 or any of the product components.\par - Foot exam 02/10/2020 within normal limits \par - To follow up with eye doctor.  \par \par #2 Blood Pressure\par Renal status:  urine microalbumin +++ 800s\par On Valsartan 320mg daily. \par Amlodipine 5mg daily \par Metropolol 50mg daily \par Referral for renal given \par \par #3 Cholesterol management\par -Triglyceride 268 mg/dl and LDL 116mg/dl.  \par -Change the pravastatin 20mg to higher potency Crestor 20mg daily \par \par Diabetes Health Care Maintenance\par - Opthalmology up to date: Yes\par -Podiatry up to date: Does not see one.  \par -Antiplatelet agent: He takes a baby ASA 81mg daily \par  -Urine microalbumin: Elevated\par -ACE-I/ARB: Valsartan 320mg\par -Patient to call for persistent glucose < 70 or > 300\par -Discussed hypoglycemic protocol.  \par -Yearly flu vaccine recommended \par \par EDUCATION: Reviewed 'ABC' of diabetes management (respective goals in parentheses): A1C (<7), blood pressure (<140/90), and cholesterol (LDL <100).\par \par COMPLIANCE at present is estimated to be:  somewhat poor. \par FOLLOW UP: I recommend that frequency of visits for diabetes care be every 3 month \par \par

## 2020-02-10 NOTE — PHYSICAL EXAM
[Alert] : alert [Well Nourished] : well nourished [No Acute Distress] : no acute distress [Normal Sclera/Conjunctiva] : normal sclera/conjunctiva [EOMI] : extra ocular movement intact [Well Developed] : well developed [No Proptosis] : no proptosis [Normal Oropharynx] : the oropharynx was normal [Thyroid Not Enlarged] : the thyroid was not enlarged [No Thyroid Nodules] : there were no palpable thyroid nodules [No Accessory Muscle Use] : no accessory muscle use [No Respiratory Distress] : no respiratory distress [Clear to Auscultation] : lungs were clear to auscultation bilaterally [Normal Rate] : heart rate was normal  [Normal S1, S2] : normal S1 and S2 [Regular Rhythm] : with a regular rhythm [Pedal Pulses Normal] : the pedal pulses are present [Normal Bowel Sounds] : normal bowel sounds [No Edema] : there was no peripheral edema [Soft] : abdomen soft [Not Tender] : non-tender [Not Distended] : not distended [Post Cervical Nodes] : posterior cervical nodes [Normal] : normal and non tender [Axillary Nodes] : axillary nodes [Anterior Cervical Nodes] : anterior cervical nodes [Spine Straight] : spine straight [No Stigmata of Cushings Syndrome] : no stigmata of cushings syndrome [No Spinal Tenderness] : no spinal tenderness [Normal Gait] : normal gait [Normal Strength/Tone] : muscle strength and tone were normal [No Rash] : no rash [Normal Reflexes] : deep tendon reflexes were 2+ and symmetric [Oriented x3] : oriented to person, place, and time [No Tremors] : no tremors [Acanthosis Nigricans] : no acanthosis nigricans

## 2020-06-08 ENCOUNTER — APPOINTMENT (OUTPATIENT)
Dept: ENDOCRINOLOGY | Facility: CLINIC | Age: 65
End: 2020-06-08

## 2020-07-09 ENCOUNTER — NON-APPOINTMENT (OUTPATIENT)
Age: 65
End: 2020-07-09

## 2020-07-09 ENCOUNTER — APPOINTMENT (OUTPATIENT)
Dept: OPHTHALMOLOGY | Facility: CLINIC | Age: 65
End: 2020-07-09
Payer: MEDICAID

## 2020-07-09 PROCEDURE — 92020 GONIOSCOPY: CPT

## 2020-07-09 PROCEDURE — 92004 COMPRE OPH EXAM NEW PT 1/>: CPT

## 2020-07-09 PROCEDURE — 76514 ECHO EXAM OF EYE THICKNESS: CPT

## 2020-08-12 ENCOUNTER — NON-APPOINTMENT (OUTPATIENT)
Age: 65
End: 2020-08-12

## 2020-08-12 ENCOUNTER — APPOINTMENT (OUTPATIENT)
Dept: OPHTHALMOLOGY | Facility: CLINIC | Age: 65
End: 2020-08-12
Payer: COMMERCIAL

## 2020-08-12 PROCEDURE — 92015 DETERMINE REFRACTIVE STATE: CPT

## 2020-08-12 PROCEDURE — 92133 CPTRZD OPH DX IMG PST SGM ON: CPT

## 2020-08-12 PROCEDURE — 92012 INTRM OPH EXAM EST PATIENT: CPT

## 2020-09-21 ENCOUNTER — APPOINTMENT (OUTPATIENT)
Dept: ENDOCRINOLOGY | Facility: CLINIC | Age: 65
End: 2020-09-21
Payer: MEDICAID

## 2020-09-21 VITALS
DIASTOLIC BLOOD PRESSURE: 70 MMHG | HEART RATE: 72 BPM | WEIGHT: 180 LBS | HEIGHT: 63 IN | SYSTOLIC BLOOD PRESSURE: 122 MMHG | BODY MASS INDEX: 31.89 KG/M2 | TEMPERATURE: 97.5 F | OXYGEN SATURATION: 96 %

## 2020-09-21 DIAGNOSIS — R10.32 LEFT LOWER QUADRANT PAIN: ICD-10-CM

## 2020-09-21 DIAGNOSIS — R31.29 OTHER MICROSCOPIC HEMATURIA: ICD-10-CM

## 2020-09-21 PROCEDURE — 99214 OFFICE O/P EST MOD 30 MIN: CPT

## 2020-09-21 NOTE — THERAPY
[Today's Date] : [unfilled] [Basaglar] : Basaglar [Admelog] : Admelog [FreeTextEntry9] : 50 UNITS TWICE DAILY  [de-identified] : 16 UNITS TID WITH MEALS

## 2020-09-21 NOTE — DATA REVIEWED
[FreeTextEntry1] : 7/10/2020\par \par TRIGLYCERIDE 325\par \par VLDL 70\par CHOLESTEROL/HDL RATIO 5.48\par A1C 12.6%\par VITAMIN D 15.9 L \par WBC 7.9\par HEMOGLOBIN 14.1\par HEMATOCRIT 44\par PLATELET 260\par SODIUM 138\par POTASSIUM 4.9\par CHLORIDE 100\par CO2 18\par GLUCOSE 210\par BUN/CREATININE RATIO 27ALK PHOS 127\par ALT 22 AST 17\par EGFR 84\par

## 2020-09-21 NOTE — THERAPY
[Today's Date] : [unfilled] [Basaglar] : Basaglar [Admelog] : Admelog [FreeTextEntry9] : 50 UNITS TWICE DAILY  [de-identified] : 16 UNITS TID WITH MEALS

## 2020-09-21 NOTE — ASSESSMENT
[FreeTextEntry1] : Mr. ABI MAC is a 64 year old M here for evaluation and treatment of diabetes mellitus, Type \par \par #1 Diabetes, type 2, poorly controlled, A1C was 12.3% in July 2020.  \par Patient only checking glucose once daily, therefore, difficult to adjust\par Agreeable to paying for Natali sensor out of pocket, will send to [facility] as it might be cheaper. \par States he knows how to use natali sensor.  \par For now, will increase Basaglar from 50 units to 55 units bid. \par -Admelog 16 units tid with meals if eating.  \par -No contraindication to restart metformin, therefore, will start Metformin ER 500mg bid and up-titrate to 1000mg next visit. \par \par #2 Hypertension \par Renal status:  urine microalbumin elevated, referral was given to nephrology.  \par On Valsartan 320mg daily. \par Amlodipine 5mg daily \par Metropolol 50mg daily \par \par #3 Cholesterol management\par -Triglyceride 325 mg/dl and LDL  \par -Recommend to increase Crestor from 20mg to 40mg daily. \par \par \par Diabetes Health Care Maintenance\par - Opthalmology up to date: Yes\par -Podiatry up to date: Does not see one.  \par -Antiplatelet agent: He takes a baby ASA 81mg daily \par  -Urine microalbumin: Elevated\par -ACE-I/ARB: Valsartan 320mg\par -Patient to call for persistent glucose < 70 or > 300\par -Discussed hypoglycemic protocol.  \par -Yearly flu vaccine recommended \par \par EDUCATION: Reviewed 'ABC' of diabetes management (respective goals in parentheses): A1C (<7), blood pressure (<140/90), and cholesterol (LDL <100).\par \par \par Given overall poor control, I recommend close follow up with CDE in 1 month.  Also to ensure he knows how to use Natali sensor. \par FOLLOW UP: I recommend that frequency of visits for diabetes care be every 3 month \par \par

## 2020-09-21 NOTE — HISTORY OF PRESENT ILLNESS
[FreeTextEntry1] : ABI MAC is a 64 year old male with PMHx of HTN, HLD, TYPE 2 DM presents for FU evaluation of Type 2 diabetes mellitus.  The patient was initially diagnosed with Type 2 diabetes mellitus 10 plus years ago. \par \par His initial DM regimen was: Janumet  mg bid, Actos 45 mg daily \par His current DM regimen is: Insulin Basaglar 50 units bid, Admelog 16 units tid with meals, not on metformin, unclear why it was discontinued.  Trulicity was prescribed but not currently covered by insurance. \par \par He only checks glucose once daily.  \par \par He reports there is diabetic retinopathy, neuropathy no known nephropathy (EGFR >100) but high microalbumin level.  \par He reports no cardiovascular risk factors, denies any history of CAD, CHF or CVA.  \par \par Current diabetes symptoms/problems include: \par Patient reports adherence to prescribed medical regimen. \par Patient reports adherence to prescribed physical activity and dietary. \par \par The following is a sample of patient’s daily diet/routine:\par 24 Hour Nutritional Intake:\par B: bread- 2 slices and eggs and tea with sugar \par L: sometimes- fruits and some bread\par D: roti, chaptai, salad, chicken and vegetables\par Drinks: water, no juice, soda (once a month) \par snacks: pretzels \par \par Checking glucose once daily, did not bring in glucose log today.  \par Average blood sugar BEFORE meals: 150-200 \par Average blood sugar AFTER meals: 300  \par \par \par In regards to His DM health maintenance: \par Last visit to ophthalmology was: Date - up to date \par Last visit to podiatry was: Date - no issue, foot exam 02/10/2020 \par The patient is adherent to diabetic foot precautions:Yes\par Last A1c was: 15.3% (nov 2019), A1C pending from 2/6/2020 labs. \par Last LDL was: 116 mg/dl \par Last urine micro-albumin was: 2/3/2020  899.3 \par \par Regarding hypertension: currently taking Valsartan 320mg once daily, Amlodipine 5mg daily, and Metoprolol 50mg \par \par Regarding hyperlipidemia, currently taking pravastatin 20mg daily, does not have any side effects. \par

## 2020-09-24 ENCOUNTER — RX RENEWAL (OUTPATIENT)
Age: 65
End: 2020-09-24

## 2020-10-14 ENCOUNTER — APPOINTMENT (OUTPATIENT)
Dept: OPHTHALMOLOGY | Facility: CLINIC | Age: 65
End: 2020-10-14

## 2021-01-25 ENCOUNTER — APPOINTMENT (OUTPATIENT)
Dept: ENDOCRINOLOGY | Facility: CLINIC | Age: 66
End: 2021-01-25

## 2021-09-15 ENCOUNTER — APPOINTMENT (OUTPATIENT)
Dept: PULMONOLOGY | Facility: CLINIC | Age: 66
End: 2021-09-15
Payer: MEDICAID

## 2021-09-15 VITALS
HEIGHT: 62.5 IN | WEIGHT: 183 LBS | SYSTOLIC BLOOD PRESSURE: 150 MMHG | TEMPERATURE: 96.7 F | DIASTOLIC BLOOD PRESSURE: 84 MMHG | HEART RATE: 77 BPM | OXYGEN SATURATION: 97 % | BODY MASS INDEX: 32.83 KG/M2

## 2021-09-15 DIAGNOSIS — R05 COUGH: ICD-10-CM

## 2021-09-15 DIAGNOSIS — Z01.812 ENCOUNTER FOR PREPROCEDURAL LABORATORY EXAMINATION: ICD-10-CM

## 2021-09-15 PROCEDURE — 99203 OFFICE O/P NEW LOW 30 MIN: CPT

## 2021-09-15 NOTE — HISTORY OF PRESENT ILLNESS
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  Patient reports PCP referred him.  He is unsure why.  He reports cough has improved.  He denies hemoptysis, SOB, chest pain or systemic complaints.

## 2021-09-15 NOTE — ASSESSMENT
[FreeTextEntry1] : 66 year old male presents for evaluation cough\par \par PFT next visit\par Consider HST \par \par Follow up after PFT

## 2021-09-22 ENCOUNTER — APPOINTMENT (OUTPATIENT)
Dept: PULMONOLOGY | Facility: CLINIC | Age: 66
End: 2021-09-22

## 2021-09-24 ENCOUNTER — APPOINTMENT (OUTPATIENT)
Dept: DERMATOLOGY | Facility: CLINIC | Age: 66
End: 2021-09-24

## 2021-09-28 ENCOUNTER — OUTPATIENT (OUTPATIENT)
Dept: OUTPATIENT SERVICES | Facility: HOSPITAL | Age: 66
LOS: 1 days | End: 2021-09-28
Payer: MEDICAID

## 2021-09-28 ENCOUNTER — APPOINTMENT (OUTPATIENT)
Dept: RADIOLOGY | Facility: IMAGING CENTER | Age: 66
End: 2021-09-28

## 2021-09-28 DIAGNOSIS — M79.672 PAIN IN LEFT FOOT: ICD-10-CM

## 2021-09-28 PROCEDURE — 73630 X-RAY EXAM OF FOOT: CPT

## 2021-09-28 PROCEDURE — 73630 X-RAY EXAM OF FOOT: CPT | Mod: 26,LT

## 2021-09-28 PROCEDURE — 73610 X-RAY EXAM OF ANKLE: CPT

## 2021-09-28 PROCEDURE — 73610 X-RAY EXAM OF ANKLE: CPT | Mod: 26,LT

## 2021-09-29 ENCOUNTER — TRANSCRIPTION ENCOUNTER (OUTPATIENT)
Age: 66
End: 2021-09-29

## 2021-09-30 ENCOUNTER — INPATIENT (INPATIENT)
Facility: HOSPITAL | Age: 66
LOS: 5 days | Discharge: HOME HEALTH | End: 2021-10-06
Attending: STUDENT IN AN ORGANIZED HEALTH CARE EDUCATION/TRAINING PROGRAM | Admitting: STUDENT IN AN ORGANIZED HEALTH CARE EDUCATION/TRAINING PROGRAM
Payer: MEDICAID

## 2021-09-30 VITALS
HEART RATE: 99 BPM | RESPIRATION RATE: 16 BRPM | SYSTOLIC BLOOD PRESSURE: 154 MMHG | DIASTOLIC BLOOD PRESSURE: 89 MMHG | TEMPERATURE: 97 F | OXYGEN SATURATION: 98 % | HEIGHT: 63 IN

## 2021-09-30 DIAGNOSIS — E11.621 TYPE 2 DIABETES MELLITUS WITH FOOT ULCER: ICD-10-CM

## 2021-09-30 DIAGNOSIS — E11.9 TYPE 2 DIABETES MELLITUS WITHOUT COMPLICATIONS: ICD-10-CM

## 2021-09-30 DIAGNOSIS — L02.619 CUTANEOUS ABSCESS OF UNSPECIFIED FOOT: ICD-10-CM

## 2021-09-30 DIAGNOSIS — I10 ESSENTIAL (PRIMARY) HYPERTENSION: ICD-10-CM

## 2021-09-30 DIAGNOSIS — E78.5 HYPERLIPIDEMIA, UNSPECIFIED: ICD-10-CM

## 2021-09-30 LAB
ALBUMIN SERPL ELPH-MCNC: 3.2 G/DL — LOW (ref 3.3–5)
ALBUMIN SERPL ELPH-MCNC: 3.7 G/DL — SIGNIFICANT CHANGE UP (ref 3.3–5)
ALP SERPL-CCNC: 105 U/L — SIGNIFICANT CHANGE UP (ref 40–120)
ALP SERPL-CCNC: 126 U/L — HIGH (ref 40–120)
ALT FLD-CCNC: 22 U/L — SIGNIFICANT CHANGE UP (ref 4–41)
ALT FLD-CCNC: 26 U/L — SIGNIFICANT CHANGE UP (ref 4–41)
ANION GAP SERPL CALC-SCNC: 15 MMOL/L — HIGH (ref 7–14)
ANION GAP SERPL CALC-SCNC: 16 MMOL/L — HIGH (ref 7–14)
AST SERPL-CCNC: 27 U/L — SIGNIFICANT CHANGE UP (ref 4–40)
AST SERPL-CCNC: 42 U/L — HIGH (ref 4–40)
B-OH-BUTYR SERPL-SCNC: 0.2 MMOL/L — SIGNIFICANT CHANGE UP (ref 0–0.4)
BASOPHILS # BLD AUTO: 0.05 K/UL — SIGNIFICANT CHANGE UP (ref 0–0.2)
BASOPHILS NFR BLD AUTO: 0.3 % — SIGNIFICANT CHANGE UP (ref 0–2)
BILIRUB SERPL-MCNC: 0.4 MG/DL — SIGNIFICANT CHANGE UP (ref 0.2–1.2)
BILIRUB SERPL-MCNC: 0.4 MG/DL — SIGNIFICANT CHANGE UP (ref 0.2–1.2)
BLOOD GAS VENOUS COMPREHENSIVE RESULT: SIGNIFICANT CHANGE UP
BLOOD GAS VENOUS COMPREHENSIVE RESULT: SIGNIFICANT CHANGE UP
BUN SERPL-MCNC: 24 MG/DL — HIGH (ref 7–23)
BUN SERPL-MCNC: 26 MG/DL — HIGH (ref 7–23)
CALCIUM SERPL-MCNC: 8.3 MG/DL — LOW (ref 8.4–10.5)
CALCIUM SERPL-MCNC: 8.9 MG/DL — SIGNIFICANT CHANGE UP (ref 8.4–10.5)
CHLORIDE SERPL-SCNC: 90 MMOL/L — LOW (ref 98–107)
CHLORIDE SERPL-SCNC: 93 MMOL/L — LOW (ref 98–107)
CO2 SERPL-SCNC: 19 MMOL/L — LOW (ref 22–31)
CO2 SERPL-SCNC: 19 MMOL/L — LOW (ref 22–31)
CREAT SERPL-MCNC: 0.81 MG/DL — SIGNIFICANT CHANGE UP (ref 0.5–1.3)
CREAT SERPL-MCNC: 0.82 MG/DL — SIGNIFICANT CHANGE UP (ref 0.5–1.3)
CRP SERPL-MCNC: 291.4 MG/L — HIGH
EOSINOPHIL # BLD AUTO: 0.02 K/UL — SIGNIFICANT CHANGE UP (ref 0–0.5)
EOSINOPHIL NFR BLD AUTO: 0.1 % — SIGNIFICANT CHANGE UP (ref 0–6)
ERYTHROCYTE [SEDIMENTATION RATE] IN BLOOD: 89 MM/HR — HIGH (ref 1–15)
GLUCOSE BLDC GLUCOMTR-MCNC: 236 MG/DL — HIGH (ref 70–99)
GLUCOSE BLDC GLUCOMTR-MCNC: 264 MG/DL — HIGH (ref 70–99)
GLUCOSE BLDC GLUCOMTR-MCNC: 305 MG/DL — HIGH (ref 70–99)
GLUCOSE SERPL-MCNC: 397 MG/DL — HIGH (ref 70–99)
GLUCOSE SERPL-MCNC: 516 MG/DL — CRITICAL HIGH (ref 70–99)
HCT VFR BLD CALC: 37.4 % — LOW (ref 39–50)
HGB BLD-MCNC: 13.1 G/DL — SIGNIFICANT CHANGE UP (ref 13–17)
IANC: 13.1 K/UL — HIGH (ref 1.5–8.5)
IMM GRANULOCYTES NFR BLD AUTO: 0.8 % — SIGNIFICANT CHANGE UP (ref 0–1.5)
LYMPHOCYTES # BLD AUTO: 0.61 K/UL — LOW (ref 1–3.3)
LYMPHOCYTES # BLD AUTO: 4.1 % — LOW (ref 13–44)
MAGNESIUM SERPL-MCNC: 2.5 MG/DL — SIGNIFICANT CHANGE UP (ref 1.6–2.6)
MCHC RBC-ENTMCNC: 27.3 PG — SIGNIFICANT CHANGE UP (ref 27–34)
MCHC RBC-ENTMCNC: 35 GM/DL — SIGNIFICANT CHANGE UP (ref 32–36)
MCV RBC AUTO: 78.1 FL — LOW (ref 80–100)
MONOCYTES # BLD AUTO: 0.8 K/UL — SIGNIFICANT CHANGE UP (ref 0–0.9)
MONOCYTES NFR BLD AUTO: 5.4 % — SIGNIFICANT CHANGE UP (ref 2–14)
NEUTROPHILS # BLD AUTO: 13.1 K/UL — HIGH (ref 1.8–7.4)
NEUTROPHILS NFR BLD AUTO: 89.3 % — HIGH (ref 43–77)
NRBC # BLD: 0 /100 WBCS — SIGNIFICANT CHANGE UP
NRBC # FLD: 0 K/UL — SIGNIFICANT CHANGE UP
PHOSPHATE SERPL-MCNC: 2.1 MG/DL — LOW (ref 2.5–4.5)
PLATELET # BLD AUTO: 350 K/UL — SIGNIFICANT CHANGE UP (ref 150–400)
POTASSIUM SERPL-MCNC: 5.4 MMOL/L — HIGH (ref 3.5–5.3)
POTASSIUM SERPL-MCNC: SIGNIFICANT CHANGE UP MMOL/L (ref 3.5–5.3)
POTASSIUM SERPL-SCNC: 5.4 MMOL/L — HIGH (ref 3.5–5.3)
POTASSIUM SERPL-SCNC: SIGNIFICANT CHANGE UP MMOL/L (ref 3.5–5.3)
PROT SERPL-MCNC: 7.4 G/DL — SIGNIFICANT CHANGE UP (ref 6–8.3)
PROT SERPL-MCNC: 8.9 G/DL — HIGH (ref 6–8.3)
RBC # BLD: 4.79 M/UL — SIGNIFICANT CHANGE UP (ref 4.2–5.8)
RBC # FLD: 13.1 % — SIGNIFICANT CHANGE UP (ref 10.3–14.5)
SARS-COV-2 RNA SPEC QL NAA+PROBE: SIGNIFICANT CHANGE UP
SODIUM SERPL-SCNC: 125 MMOL/L — LOW (ref 135–145)
SODIUM SERPL-SCNC: 127 MMOL/L — LOW (ref 135–145)
WBC # BLD: 14.7 K/UL — HIGH (ref 3.8–10.5)
WBC # FLD AUTO: 14.7 K/UL — HIGH (ref 3.8–10.5)

## 2021-09-30 PROCEDURE — 93010 ELECTROCARDIOGRAM REPORT: CPT

## 2021-09-30 PROCEDURE — 71045 X-RAY EXAM CHEST 1 VIEW: CPT | Mod: 26

## 2021-09-30 PROCEDURE — 73600 X-RAY EXAM OF ANKLE: CPT | Mod: 26,LT

## 2021-09-30 PROCEDURE — 99223 1ST HOSP IP/OBS HIGH 75: CPT | Mod: GC

## 2021-09-30 PROCEDURE — 73630 X-RAY EXAM OF FOOT: CPT | Mod: 26,LT

## 2021-09-30 PROCEDURE — 99285 EMERGENCY DEPT VISIT HI MDM: CPT | Mod: 25

## 2021-09-30 RX ORDER — INSULIN LISPRO 100/ML
VIAL (ML) SUBCUTANEOUS
Refills: 0 | Status: DISCONTINUED | OUTPATIENT
Start: 2021-09-30 | End: 2021-10-06

## 2021-09-30 RX ORDER — GABAPENTIN 400 MG/1
1 CAPSULE ORAL
Qty: 0 | Refills: 0 | DISCHARGE

## 2021-09-30 RX ORDER — ENOXAPARIN SODIUM 100 MG/ML
40 INJECTION SUBCUTANEOUS DAILY
Refills: 0 | Status: DISCONTINUED | OUTPATIENT
Start: 2021-09-30 | End: 2021-10-06

## 2021-09-30 RX ORDER — INSULIN GLARGINE 100 [IU]/ML
55 INJECTION, SOLUTION SUBCUTANEOUS
Qty: 0 | Refills: 0 | DISCHARGE

## 2021-09-30 RX ORDER — VANCOMYCIN HCL 1 G
1000 VIAL (EA) INTRAVENOUS EVERY 12 HOURS
Refills: 0 | Status: DISCONTINUED | OUTPATIENT
Start: 2021-10-01 | End: 2021-10-02

## 2021-09-30 RX ORDER — DEXTROSE 50 % IN WATER 50 %
12.5 SYRINGE (ML) INTRAVENOUS ONCE
Refills: 0 | Status: DISCONTINUED | OUTPATIENT
Start: 2021-09-30 | End: 2021-09-30

## 2021-09-30 RX ORDER — DEXTROSE 50 % IN WATER 50 %
12.5 SYRINGE (ML) INTRAVENOUS ONCE
Refills: 0 | Status: DISCONTINUED | OUTPATIENT
Start: 2021-09-30 | End: 2021-10-06

## 2021-09-30 RX ORDER — OXYBUTYNIN CHLORIDE 5 MG
1 TABLET ORAL
Qty: 0 | Refills: 0 | DISCHARGE

## 2021-09-30 RX ORDER — INSULIN LISPRO 100/ML
6 VIAL (ML) SUBCUTANEOUS ONCE
Refills: 0 | Status: COMPLETED | OUTPATIENT
Start: 2021-09-30 | End: 2021-09-30

## 2021-09-30 RX ORDER — INSULIN LISPRO 100/ML
VIAL (ML) SUBCUTANEOUS EVERY 4 HOURS
Refills: 0 | Status: DISCONTINUED | OUTPATIENT
Start: 2021-09-30 | End: 2021-09-30

## 2021-09-30 RX ORDER — INSULIN LISPRO 100/ML
18 VIAL (ML) SUBCUTANEOUS
Qty: 0 | Refills: 0 | DISCHARGE

## 2021-09-30 RX ORDER — GLUCAGON INJECTION, SOLUTION 0.5 MG/.1ML
1 INJECTION, SOLUTION SUBCUTANEOUS ONCE
Refills: 0 | Status: DISCONTINUED | OUTPATIENT
Start: 2021-09-30 | End: 2021-10-06

## 2021-09-30 RX ORDER — INSULIN LISPRO 100/ML
14 VIAL (ML) SUBCUTANEOUS
Qty: 0 | Refills: 0 | DISCHARGE

## 2021-09-30 RX ORDER — INSULIN LISPRO 100/ML
VIAL (ML) SUBCUTANEOUS AT BEDTIME
Refills: 0 | Status: DISCONTINUED | OUTPATIENT
Start: 2021-09-30 | End: 2021-10-06

## 2021-09-30 RX ORDER — SODIUM CHLORIDE 9 MG/ML
1000 INJECTION INTRAMUSCULAR; INTRAVENOUS; SUBCUTANEOUS ONCE
Refills: 0 | Status: COMPLETED | OUTPATIENT
Start: 2021-09-30 | End: 2021-09-30

## 2021-09-30 RX ORDER — DEXTROSE 50 % IN WATER 50 %
15 SYRINGE (ML) INTRAVENOUS ONCE
Refills: 0 | Status: DISCONTINUED | OUTPATIENT
Start: 2021-09-30 | End: 2021-10-06

## 2021-09-30 RX ORDER — INSULIN LISPRO 100/ML
14 VIAL (ML) SUBCUTANEOUS
Refills: 0 | Status: DISCONTINUED | OUTPATIENT
Start: 2021-09-30 | End: 2021-10-03

## 2021-09-30 RX ORDER — AMLODIPINE BESYLATE 2.5 MG/1
5 TABLET ORAL DAILY
Refills: 0 | Status: DISCONTINUED | OUTPATIENT
Start: 2021-09-30 | End: 2021-10-02

## 2021-09-30 RX ORDER — INSULIN GLARGINE 100 [IU]/ML
40 INJECTION, SOLUTION SUBCUTANEOUS
Refills: 0 | Status: DISCONTINUED | OUTPATIENT
Start: 2021-09-30 | End: 2021-10-01

## 2021-09-30 RX ORDER — SODIUM CHLORIDE 9 MG/ML
1000 INJECTION, SOLUTION INTRAVENOUS
Refills: 0 | Status: DISCONTINUED | OUTPATIENT
Start: 2021-09-30 | End: 2021-10-06

## 2021-09-30 RX ORDER — ONDANSETRON 8 MG/1
4 TABLET, FILM COATED ORAL EVERY 8 HOURS
Refills: 0 | Status: DISCONTINUED | OUTPATIENT
Start: 2021-09-30 | End: 2021-10-06

## 2021-09-30 RX ORDER — INSULIN GLARGINE 100 [IU]/ML
44 INJECTION, SOLUTION SUBCUTANEOUS
Refills: 0 | Status: DISCONTINUED | OUTPATIENT
Start: 2021-09-30 | End: 2021-09-30

## 2021-09-30 RX ORDER — DEXTROSE 50 % IN WATER 50 %
15 SYRINGE (ML) INTRAVENOUS ONCE
Refills: 0 | Status: DISCONTINUED | OUTPATIENT
Start: 2021-09-30 | End: 2021-09-30

## 2021-09-30 RX ORDER — DEXTROSE 50 % IN WATER 50 %
25 SYRINGE (ML) INTRAVENOUS ONCE
Refills: 0 | Status: DISCONTINUED | OUTPATIENT
Start: 2021-09-30 | End: 2021-09-30

## 2021-09-30 RX ORDER — INSULIN GLARGINE 100 [IU]/ML
20 INJECTION, SOLUTION SUBCUTANEOUS ONCE
Refills: 0 | Status: DISCONTINUED | OUTPATIENT
Start: 2021-09-30 | End: 2021-09-30

## 2021-09-30 RX ORDER — SODIUM CHLORIDE 9 MG/ML
1000 INJECTION, SOLUTION INTRAVENOUS
Refills: 0 | Status: DISCONTINUED | OUTPATIENT
Start: 2021-09-30 | End: 2021-09-30

## 2021-09-30 RX ORDER — ACETAMINOPHEN 500 MG
650 TABLET ORAL EVERY 6 HOURS
Refills: 0 | Status: DISCONTINUED | OUTPATIENT
Start: 2021-09-30 | End: 2021-10-06

## 2021-09-30 RX ORDER — METOPROLOL TARTRATE 50 MG
50 TABLET ORAL DAILY
Refills: 0 | Status: DISCONTINUED | OUTPATIENT
Start: 2021-09-30 | End: 2021-10-06

## 2021-09-30 RX ORDER — AMLODIPINE AND VALSARTAN 5; 320 MG/1; MG/1
1 TABLET, FILM COATED ORAL
Qty: 0 | Refills: 0 | DISCHARGE

## 2021-09-30 RX ORDER — DEXTROSE 50 % IN WATER 50 %
25 SYRINGE (ML) INTRAVENOUS ONCE
Refills: 0 | Status: DISCONTINUED | OUTPATIENT
Start: 2021-09-30 | End: 2021-10-06

## 2021-09-30 RX ORDER — HUMAN INSULIN 100 [IU]/ML
35 INJECTION, SUSPENSION SUBCUTANEOUS ONCE
Refills: 0 | Status: DISCONTINUED | OUTPATIENT
Start: 2021-09-30 | End: 2021-09-30

## 2021-09-30 RX ORDER — METOPROLOL TARTRATE 50 MG
1 TABLET ORAL
Qty: 0 | Refills: 0 | DISCHARGE

## 2021-09-30 RX ORDER — HUMAN INSULIN 100 [IU]/ML
20 INJECTION, SUSPENSION SUBCUTANEOUS ONCE
Refills: 0 | Status: COMPLETED | OUTPATIENT
Start: 2021-09-30 | End: 2021-09-30

## 2021-09-30 RX ORDER — INSULIN GLARGINE 100 [IU]/ML
40 INJECTION, SOLUTION SUBCUTANEOUS
Qty: 0 | Refills: 0 | DISCHARGE

## 2021-09-30 RX ORDER — GABAPENTIN 400 MG/1
300 CAPSULE ORAL AT BEDTIME
Refills: 0 | Status: DISCONTINUED | OUTPATIENT
Start: 2021-09-30 | End: 2021-10-05

## 2021-09-30 RX ORDER — PIPERACILLIN AND TAZOBACTAM 4; .5 G/20ML; G/20ML
3.38 INJECTION, POWDER, LYOPHILIZED, FOR SOLUTION INTRAVENOUS ONCE
Refills: 0 | Status: COMPLETED | OUTPATIENT
Start: 2021-09-30 | End: 2021-09-30

## 2021-09-30 RX ORDER — VANCOMYCIN HCL 1 G
1000 VIAL (EA) INTRAVENOUS ONCE
Refills: 0 | Status: COMPLETED | OUTPATIENT
Start: 2021-09-30 | End: 2021-09-30

## 2021-09-30 RX ORDER — METFORMIN HYDROCHLORIDE 850 MG/1
1 TABLET ORAL
Qty: 0 | Refills: 0 | DISCHARGE

## 2021-09-30 RX ORDER — AMLODIPINE BESYLATE 2.5 MG/1
1 TABLET ORAL
Qty: 0 | Refills: 0 | DISCHARGE

## 2021-09-30 RX ORDER — ATORVASTATIN CALCIUM 80 MG/1
1 TABLET, FILM COATED ORAL
Qty: 0 | Refills: 0 | DISCHARGE

## 2021-09-30 RX ORDER — INFLUENZA VIRUS VACCINE 15; 15; 15; 15 UG/.5ML; UG/.5ML; UG/.5ML; UG/.5ML
0.5 SUSPENSION INTRAMUSCULAR ONCE
Refills: 0 | Status: DISCONTINUED | OUTPATIENT
Start: 2021-09-30 | End: 2021-10-06

## 2021-09-30 RX ORDER — OXYBUTYNIN CHLORIDE 5 MG
5 TABLET ORAL AT BEDTIME
Refills: 0 | Status: DISCONTINUED | OUTPATIENT
Start: 2021-09-30 | End: 2021-10-06

## 2021-09-30 RX ORDER — PIPERACILLIN AND TAZOBACTAM 4; .5 G/20ML; G/20ML
3.38 INJECTION, POWDER, LYOPHILIZED, FOR SOLUTION INTRAVENOUS EVERY 8 HOURS
Refills: 0 | Status: DISCONTINUED | OUTPATIENT
Start: 2021-09-30 | End: 2021-10-05

## 2021-09-30 RX ORDER — LANOLIN ALCOHOL/MO/W.PET/CERES
3 CREAM (GRAM) TOPICAL AT BEDTIME
Refills: 0 | Status: DISCONTINUED | OUTPATIENT
Start: 2021-09-30 | End: 2021-10-06

## 2021-09-30 RX ADMIN — SODIUM CHLORIDE 1000 MILLILITER(S): 9 INJECTION INTRAMUSCULAR; INTRAVENOUS; SUBCUTANEOUS at 12:03

## 2021-09-30 RX ADMIN — HUMAN INSULIN 20 UNIT(S): 100 INJECTION, SUSPENSION SUBCUTANEOUS at 18:33

## 2021-09-30 RX ADMIN — GABAPENTIN 300 MILLIGRAM(S): 400 CAPSULE ORAL at 22:26

## 2021-09-30 RX ADMIN — PIPERACILLIN AND TAZOBACTAM 25 GRAM(S): 4; .5 INJECTION, POWDER, LYOPHILIZED, FOR SOLUTION INTRAVENOUS at 22:27

## 2021-09-30 RX ADMIN — PIPERACILLIN AND TAZOBACTAM 200 GRAM(S): 4; .5 INJECTION, POWDER, LYOPHILIZED, FOR SOLUTION INTRAVENOUS at 12:49

## 2021-09-30 RX ADMIN — Medication 14 UNIT(S): at 18:29

## 2021-09-30 RX ADMIN — Medication 650 MILLIGRAM(S): at 23:53

## 2021-09-30 RX ADMIN — Medication 6 UNIT(S): at 12:50

## 2021-09-30 RX ADMIN — Medication 5 MILLIGRAM(S): at 22:26

## 2021-09-30 RX ADMIN — INSULIN GLARGINE 40 UNIT(S): 100 INJECTION, SOLUTION SUBCUTANEOUS at 22:26

## 2021-09-30 RX ADMIN — Medication 250 MILLIGRAM(S): at 14:15

## 2021-09-30 RX ADMIN — SODIUM CHLORIDE 1000 MILLILITER(S): 9 INJECTION INTRAMUSCULAR; INTRAVENOUS; SUBCUTANEOUS at 14:15

## 2021-09-30 RX ADMIN — Medication 6: at 18:27

## 2021-09-30 NOTE — H&P ADULT - ASSESSMENT
67 y/o male with sig med hx of DMII, with past admission in 2019 for DKA, presents to the ED with 3 days of worsening foot abscess and possible soft tissue infection s/p falling off a few steps on a ladder and stubbing his toe. In the setting of uncontrolled diabetes type due to medication non-compliance, trauma leading to a foot abscess is a leading dx, less concerning for osteomyletis. Plan is to control the pt's hyperglycemia, start IV vancomycin for MRSA coverage, and f/u MRI to rule out osteomyelitis.

## 2021-09-30 NOTE — ED PROVIDER NOTE - CLINICAL SUMMARY MEDICAL DECISION MAKING FREE TEXT BOX
66 year old male with pmhx of DM, HTN, HLD presents to the ED with 3 days of worsening foot ulcer. patient reports 3 days ago patient was going up steps when he stubbed his left great toe. patient reports over the next few days he noticed discharge and worsening pain to toe. patient was seen by podiatrist Donell Thao yesterday which did some debridement wrapped and sent to the ED for IV abx and admission. BW, CXR, XR, UA, IV ABX, ESR, CRP, podiatry consult, pain management, reaassess, admit

## 2021-09-30 NOTE — ED ADULT TRIAGE NOTE - CHIEF COMPLAINT QUOTE
Pt with diabetic fott ulcer to left great toe for the past 3 days.  Sent for admission and IV abx.  Pt states that he  has not taken any of his medications for the past 2 days because he was tired.  PMH: HTN, DM, HLD

## 2021-09-30 NOTE — ED PROVIDER NOTE - PROGRESS NOTE DETAILS
Dicussed with PCP Oliver, has no specific doctor to admit to.  reports patient was seen by him next day after trauma to to foot and had imaging done that was negative. reports patient BGL has "always" been uncontrolled. Bhavin Hernandes PA-C Discussed with PCP Oliver, has no specific doctor to admit to.  reports patient was seen by him next day after trauma to to foot and had imaging done that was negative. reports patient BGL has "always" been uncontrolled. Bhavin Hernandes PA-C discussed with surgery team 1 advising to administer the 20 units of insulin. nurse and MD made aware Bhavin Hernandes PA-C discussed with team 1 resident advising to administer the 20 units of insulin. nurse and MD made aware Bhavin Hernandes PA-C

## 2021-09-30 NOTE — H&P ADULT - PROBLEM SELECTOR PLAN 1
Infectious in the setting of poorly controlled DM2 s/p trauma --> open wound. Pt denies fevers, chills, or other systemic complaints. Likely a localized condition at this time with less concern for osteomyllitis as wound does not probe to the bone as per podiatrist Dr. Thao.     -IV vancomycin weight based dosing (9/30-)  -NS fluids 150/cc for 3L total   -control hyperglycemia as listed below  -F/u MRI to rule out osteomylitis.   -f/u wound culture taken in outpatient podiatry setting Infectious in the setting of poorly controlled DM2 s/p trauma --> open wound. Pt denies fevers, chills, or other systemic complaints. Likely a localized condition at this time with less concern for osteomyllitis as wound does not probe to the bone as per podiatrist Dr. Thao.     -IV vancomycin weight based dosing (9/30-)  -NS fluids 150/cc for 3L total   -control hyperglycemia as listed below  -F/u MRI to rule out osteomylitis.   -f/u wound culture taken in outpatient podiatry setting  -

## 2021-09-30 NOTE — ED PROVIDER NOTE - ATTENDING CONTRIBUTION TO CARE
I performed a face to face evaluation of this patient and obtained a history and performed a full exam.  I agree with the history, physical exam and plan of the PA.  66 year old male with pmhx of DM, HTN, HLD presents to the ED with 3 days of worsening foot ulcer. patient reports 3 days ago patient was going up steps when he stubbed his left great toe. patient reports over the next few days he noticed discharge and worsening pain to toe. patient was seen by podiatrist Donell Thao yesterday which did some debridement wrapped and sent to the ED for IV abx and admission. BW, CXR, XR, UA, IV ABX, ESR, CRP, podiatry consult, pain management, reaassess, admit.    Let 1st toe with ulcer at pad/base, dorsal foot red and swollen.  FS also elevated- for labs, ivf, will need insulin, podiatry and admit

## 2021-09-30 NOTE — H&P ADULT - NSHPPHYSICALEXAM_GEN_ALL_CORE
PHYSICAL EXAM:   · CONSTITUTIONAL: Well appearing, awake, alert, oriented to person, place, time/situation and in no apparent distress.  · ENMT: Airway patent, Nasal mucosa clear. Mouth with normal mucosa. Throat has no vesicles, no oropharyngeal exudates and uvula is midline.  · EYES: Clear bilaterally, pupils equal, round and reactive to light.  · CARDIAC: Normal rate, regular rhythm.  Heart sounds S1, S2.  No murmurs, rubs or gallops.  · RESPIRATORY: Breath sounds clear and equal bilaterally.  · GASTROINTESTINAL: Abdomen soft, non-tender, no guarding.  · MUSCULOSKELETAL: Spine appears normal, range of motion is not limited, no muscle or joint tenderness  · NEUROLOGICAL: Alert and oriented, no focal deficits, no motor or sensory deficits.  · SKIN: WOUNDS  · SKIN COLOR: normal for race  · SKIN TEMP: warm  · SKIN CAP REFILL: less than 2 seconds  · SKIN TURGOR: resilient/elastic  · SKIN WOUND TYPE: ulcer and open skin  · SKIN WOUND DESC: clean  · SKIN LOC 1: toe...  · Laterality #1: left 1st  · Area #1: plantar  · PSYCHIATRIC: Alert and oriented to person, place, time/situation. normal mood and affect. no apparent risk to self or others. PHYSICAL EXAM:   · CONSTITUTIONAL: Well appearing, awake, alert, oriented to person, place, time/situation and in no apparent distress.  · ENMT: Airway patent, Nasal mucosa clear. Mouth with normal mucosa. Throat has no vesicles, no oropharyngeal exudates and uvula is midline.  · EYES: Clear bilaterally, pupils equal, round and reactive to light.  · CARDIAC: Normal rate, regular rhythm.  Heart sounds S1, S2.  No murmurs, rubs or gallops.  · RESPIRATORY: Breath sounds clear and equal bilaterally.  · GASTROINTESTINAL: Abdomen soft, non-tender, no guarding.  · MUSCULOSKELETAL: Spine appears normal, range of motion is not limited, no muscle or joint tenderness  · NEUROLOGICAL: Alert and oriented, no focal deficits, no motor or sensory deficits.  · SKIN: scattered ulcerations and lesions with hyperpigmentation, chronic in nature  · SKIN COLOR: normal for race  · Area #1: plantar  · PSYCHIATRIC: Alert and oriented to person, place, time/situation. normal mood and affect. no apparent risk to self or others.

## 2021-09-30 NOTE — CONSULT NOTE ADULT - SUBJECTIVE AND OBJECTIVE BOX
Patient is a 66y old  Male who presents with a chief complaint of Foot wound and Uncontrolled DM2 (30 Sep 2021 14:50)      HPI:  66 year old male with pmhx of DM, HTN, HLD presents to the ED with 3 days of worsening foot ulcer. patient reports 3 days ago patient was going up steps when he stubbed his left great toe. patient reports over the next few days he noticed discharge and worsening pain to toe. patient was seen by podiatrist Donell Thao yesterday which did some debridement wrapped and sent to the ED for IV abx and admission. patient reports he has been "tired" for the last week and has not taken his medications and restarted last night. patient denies chest pain, Shortness of Breath, abdominal pain, Nausea/Vomiting/Diarrhea, dizziness, weakness, confusion, vision changes, urinary symptoms, syncope, falls, fevers    PAST MEDICAL & SURGICAL HISTORY:  HTN (hypertension)    Diabetes    Hyperlipidemia    No significant past surgical history        MEDICATIONS  (STANDING):  dextrose 40% Gel 15 Gram(s) Oral once  dextrose 5%. 1000 milliLiter(s) (50 mL/Hr) IV Continuous <Continuous>  dextrose 5%. 1000 milliLiter(s) (100 mL/Hr) IV Continuous <Continuous>  dextrose 50% Injectable 25 Gram(s) IV Push once  dextrose 50% Injectable 12.5 Gram(s) IV Push once  dextrose 50% Injectable 25 Gram(s) IV Push once  glucagon  Injectable 1 milliGRAM(s) IntraMuscular once  insulin glargine Injectable (LANTUS) 20 Unit(s) SubCutaneous once  insulin lispro (ADMELOG) corrective regimen sliding scale   SubCutaneous every 4 hours    MEDICATIONS  (PRN):      Allergies    No Known Allergies    Intolerances        VITALS:    Vital Signs Last 24 Hrs  T(C): 36.2 (30 Sep 2021 09:55), Max: 36.2 (30 Sep 2021 09:55)  T(F): 97.2 (30 Sep 2021 09:55), Max: 97.2 (30 Sep 2021 09:55)  HR: 99 (30 Sep 2021 09:55) (99 - 99)  BP: 154/89 (30 Sep 2021 09:55) (154/89 - 154/89)  BP(mean): --  RR: 16 (30 Sep 2021 09:55) (16 - 16)  SpO2: 98% (30 Sep 2021 09:55) (98% - 98%)    LABS:                          13.1   14.70 )-----------( 350      ( 30 Sep 2021 12:11 )             37.4       09-30    127<L>  |  93<L>  |  24<H>  ----------------------------<  397<H>  5.4<H>   |  19<L>  |  0.82    Ca    8.3<L>      30 Sep 2021 14:28  Phos  2.1     09-30  Mg     2.50     09-30    TPro  7.4  /  Alb  3.2<L>  /  TBili  0.4  /  DBili  x   /  AST  27  /  ALT  22  /  AlkPhos  105  09-30      CAPILLARY BLOOD GLUCOSE      POCT Blood Glucose.: 404 mg/dL (30 Sep 2021 14:04)  POCT Blood Glucose.: 448 mg/dL (30 Sep 2021 12:46)  POCT Blood Glucose.: 576 mg/dL (30 Sep 2021 09:58)          LOWER EXTREMITY PHYSICAL EXAM:    Vascular: DP/PT 2/4, B/L, CFT <3 seconds B/L, Temperature gradient warm to warm B/L.   Neuro: Epicritic sensation intact to the level of digits, B/L.  Musculoskeletal/Ortho: edema noted to the left ankle, and lower left leg  Skin: left foot plantar hallux escahr with periwound healthy dermis, no drainage, no purulence, no malodor, edema noted to L ankle and lower leg with redness and warmth    RADIOLOGY & ADDITIONAL STUDIES:  < from: Xray Ankle 2 Views, Left (09.30.21 @ 13:48) >    EXAM:  XR ANKLE 2 VIEWS LT      EXAM:  XR FOOT COMP MIN 3 VIEWS LT        PROCEDURE DATE:  Sep 30 2021         INTERPRETATION:  CLINICAL INDICATION: left ankle and foot pain and swelling    EXAM:  Frontal, oblique, and lateral left ankle and foot from 9/20/2021 at 1348. Compared to prior study from 9/28/2021.    IMPRESSION:  Generalized soft tissue swelling and ankle joint effusion. No tracking soft tissue gas collections, radiopaque foreign bodies, or gross radiographic evidence for osteomyelitis.    No fractures or dislocations.    Congruent ankle mortise with smooth and intact talar dome. Tarsometatarsal alignment maintained without evidence for a Lisfranc injury.    Preserved visualized joint spaces and no joint margin erosions.    Posterosuperior calcaneal Carey deformity and small plantar and posterior calcaneal enthesophytes.    No discrete suspicious lytic or blastic lesions.    Nonspecific focal small nodular dystrophic calcification in lower anterior leg subcutaneous tissues.    --- End of Report ---              JENY MCCOY MD; Attending Radiologist  This document has been electronically signed. Sep 30 2021  2:55PM    < end of copied text >

## 2021-09-30 NOTE — CONSULT NOTE ADULT - ASSESSMENT
66M with left foot wound and ankle swelling 2/2 fall  - patient seen and evaluated  - afebrile, WBC 14.7, CRP 29.1, ESR 89  - LF/AXR: No gas, no OM, no fractures  - JASON: left foot plantar hallux eschar with periwound healthy dermis, no drainage, no purulence, no malodor, edema noted to L ankle and lower leg with redness and warmth  - rec admit to medicine  - rec IV abx Vanc/ Zosyn  - ordered LF MRI   - pod plan for LWC with IV antibiotics   - discussed with attending

## 2021-09-30 NOTE — ED PROVIDER NOTE - OBJECTIVE STATEMENT
66 year old male with pmhx of DM, HTN, HLD presents to the ED with 3 days of worsening foot ulcer. patient reports 3 days ago patient was going up steps when he stubbed his left great toe. patient reports over the next few days he noticed discharge and worsening pain to toe. patient was seen by podiatrist Donell Thao yesterday which did some debridement wrapped and sent to the ED for IV abx and admission. patient reports he has been "tired" for the last week and has not taken his medications and restarted last night. patient denies chest pain, Shortness of Breath, abdominal pain, Nausea/Vomiting/Diarrhea, dizziness, weakness, confusion, vision changes, urinary symptoms, syncope, falls, fevers

## 2021-09-30 NOTE — ED ADULT NURSE NOTE - OBJECTIVE STATEMENT
awake and alert, diabetic patient with left foot wound, with pus drainage and foot swelling.  IV placed, NS infusing well.

## 2021-09-30 NOTE — H&P ADULT - HISTORY OF PRESENT ILLNESS
67 y/o male with sig med hx of DMII, with past admission in 2019 for DKA, presents to the ED with 3 days of worsening foot abscess and possible soft tissue infection s/p falling off a few steps on a ladder and stubbing his toe. Patient reports over the next few days he noticed discharge and worsening pain to toe. Patient was seen by podiatrist Donell Thao yesterday who debrided the wound, drained an abscess and noted he DID NOT probe to the bone, also notes erythema and pain to the ankle. Dr. Thao sent the pt to the ED with an oral dose of doxycycline 100 BID for coverage until pt can get IV empiric abx and admission. Patient reports he has been "tired" for the last week and has not taken his medications (namely insulin). Patient denies chest pain, Shortness of Breath, abdominal pain, Nausea/Vomiting/Diarrhea, dizziness, weakness, confusion, vision changes, urinary symptoms, syncope, falls, fevers    Of note on presentation, pt was hyperglycemic to the 500s, Na of 125 (uncorrected), K of 5.4 (slightly hemolyzed), slight anion gap to 16, but not acidotic. Pending ketones in the serum.

## 2021-09-30 NOTE — H&P ADULT - PROBLEM SELECTOR PLAN 3
-cont at home lipitor 40 QD pt normotensive at this time  -hold valsartan 320 QD now as pt is hyperkalemic, reassess tomorrow after BMP shows normal K  -cont amlodipine 5mg for now

## 2021-09-30 NOTE — ED ADULT NURSE NOTE - NS ED NOTE ABUSE RESPONSE YN
Patient instructed to use lid hygiene daily. Apply baby shampoo to warm washcloth and scrub eyelids gently with eyes closed, then rinse thoroughly. Yes

## 2021-09-30 NOTE — ED ADULT NURSE REASSESSMENT NOTE - NS ED NURSE REASSESS COMMENT FT1
repeat FS completed, 305. MAR team made aware, as per MD, hold NPH insulin for now repeat FS completed, 305. MAR team made aware, as per MD, give NPH insulin now

## 2021-09-30 NOTE — H&P ADULT - NSHPREVIEWOFSYSTEMS_GEN_ALL_CORE
· CONSTITUTIONAL: no fever and no chills.  · CARDIOVASCULAR: no chest pain or palpitations  · RESPIRATORY: no chest pain, no cough, and no shortness of breath.  · GASTROINTESTINAL: no abdominal pain, no bloating, no constipation, no diarrhea, no nausea and no vomiting.  · GENITOURINARY: no dysuria, no frequency, and no hematuria.  · SKIN: long term skin ulcers that are chronic in nature and non-pruritic and non-painful  · Skin [+]: Ulcer to L great toe,  · ROS STATEMENT: all other ROS negative except as per HPI

## 2021-09-30 NOTE — H&P ADULT - PROBLEM SELECTOR PLAN 2
Pt has poorly controlled DM2 with hyperglycemia to the 400s, A1C of 12%, sodium of 125 (135 corrected Rodrigo formula), and K of 5.4. His basal insulin is 55 BID, with 20u premeal 2 times daily    -pt is s/p Pt has poorly controlled DM2 with hyperglycemia to the 400s, A1C of 12%, sodium of 125 (135 corrected Rodrigo formula), and K of 5.4. His basal insulin is 55 BID, with 20u premeal 2 times daily    -pt is will receive NPH 20u now  -pt will get 40 basal tonight at bedtime  -pt will have regular diet and receive 20u of premeal insulin   -goal is to keep pt within 80-90% of his total at home insulin   -endocrine consult for outpatient f/u for better DM2 control   -cont at home gabapentin 300mg TID for diabetes neuropathic pain   -FS Q6 Pt has poorly controlled DM2 with hyperglycemia to the 400s, A1C of 12%, sodium of 125 (135 corrected Rodrigo formula), and K of 5.4. His basal insulin is 55 BID, with 20u premeal 2 times daily    -pt is will receive NPH 20u now  -pt will get 40 basal tonight at bedtime  -pt will have regular diet and receive 20u of premeal insulin   -goal is to keep pt within 80-90% of his total at home insulin   -endocrine consult for outpatient f/u for better DM2 control   -cont at home gabapentin 300mg TID for diabetes neuropathic pain   -FS Q6  -BMP Q12 to normalize Na and K

## 2021-09-30 NOTE — ED ADULT NURSE NOTE - NSIMPLEMENTINTERV_GEN_ALL_ED
Implemented All Fall Risk Interventions:  [place] to call system. Call bell, personal items and telephone within reach. Instruct patient to call for assistance. Room bathroom lighting operational. Non-slip footwear when patient is off stretcher. Physically safe environment: no spills, clutter or unnecessary equipment. Stretcher in lowest position, wheels locked, appropriate side rails in place. Provide visual cue, wrist band, yellow gown, etc. Monitor gait and stability. Monitor for mental status changes and reorient to person, place, and time. Review medications for side effects contributing to fall risk. Reinforce activity limits and safety measures with patient and family.

## 2021-09-30 NOTE — H&P ADULT - NSHPLABSRESULTS_GEN_ALL_CORE
.  LABS:                         13.1   14.70 )-----------( 350      ( 30 Sep 2021 12:11 )             37.4     09-30    127<L>  |  93<L>  |  24<H>  ----------------------------<  397<H>  5.4<H>   |  19<L>  |  0.82    Ca    8.3<L>      30 Sep 2021 14:28  Phos  2.1     09-30  Mg     2.50     09-30    TPro  7.4  /  Alb  3.2<L>  /  TBili  0.4  /  DBili  x   /  AST  27  /  ALT  22  /  AlkPhos  105  09-30      < from: Xray Ankle 2 Views, Left (09.30.21 @ 13:48) >    IMPRESSION:  Generalized soft tissue swelling and ankle joint effusion. No tracking soft tissue gas collections, radiopaque foreign bodies, or gross radiographic evidence for osteomyelitis.    No fractures or dislocations.    Congruent ankle mortise with smooth and intact talar dome. Tarsometatarsal alignment maintained without evidence for a Lisfranc injury.    Preserved visualized joint spaces and no joint margin erosions.    Posterosuperior calcaneal Carey deformity and small plantar and posterior calcaneal enthesophytes.    No discrete suspicious lytic or blastic lesions.    Nonspecific focal small nodular dystrophic calcification in lower anterior leg subcutaneous tissues.    --- End of Report ---        < end of copied text >    < from: Xray Chest 1 View- PORTABLE-Urgent (Xray Chest 1 View- PORTABLE-Urgent .) (09.30.21 @ 12:40) >      FINDINGS:    Clear lungs. No pleural effusion. No pneumothorax. Cardiac silhouette size cannot be accurately assessed on this projection. No acute osseous findings.    IMPRESSION:    Clear lungs      < end of copied text >                RADIOLOGY, EKG & ADDITIONAL TESTS: Reviewed.

## 2021-09-30 NOTE — H&P ADULT - ATTENDING COMMENTS
65 yo M with hx of HTN, HLD, uncontrolled DM here for infected L foot ulcer with associated cellulitis.   -start vanco/zosyn for now. f/u blood cx. will reach out to outpt podiatrist Re: cx result from recent debridement   -obtain MR foot r/o osteo  -podiatry consulted, will f/u recs   -FS>500 on admission, no e/o DKA. takes lantus 55 bid and lispro 16 tid at home, as dose last night. will dose NPH 20u now and lantus 40 bid. c/w premeal lispro 14 and ss  -Na 125 in the setting of hyperglycemia, correct Na ~135. monitor for now  -K 5.6 hemolyzed specimen- prudent to hold arb today. if repeat K normal tomorrow will resume. c/w norvasc and metoprolol for BP for now   -s/p 2L NS in ER. will give 1 more Liter @150cc/hr  rest of the plan as above

## 2021-09-30 NOTE — ED ADULT NURSE REASSESSMENT NOTE - NS ED NURSE REASSESS COMMENT FT1
No acute distress at present. Pt. is admitted to bed 906b. Report given to RN. Pt. is awaiting transport.

## 2021-09-30 NOTE — CHART NOTE - NSCHARTNOTEFT_GEN_A_CORE
I called pt's pharmacy and this is the medical reconciliation I was notified of:    doxycycline 100 BID (podiatrist Dr. Donell Thao)  z pack (PCP)  metformin er 500 BID   admelog 18 bid before meals  insulin 55U BID  lipitor 40   metroprolol succinate 50   amlodipine 5  oxybuytinin 5  gabapentin 300

## 2021-09-30 NOTE — ED ADULT NURSE REASSESSMENT NOTE - NS ED NURSE REASSESS COMMENT FT1
report received from RN Dianne, diabetic ulcer noted on the bottom of left great toe. pt c/o pain to toe, states it is tolerable does not required any pain medication at present.  respiration even and non-labored. in nad.

## 2021-10-01 ENCOUNTER — APPOINTMENT (OUTPATIENT)
Dept: ENDOCRINOLOGY | Facility: CLINIC | Age: 66
End: 2021-10-01

## 2021-10-01 ENCOUNTER — TRANSCRIPTION ENCOUNTER (OUTPATIENT)
Age: 66
End: 2021-10-01

## 2021-10-01 LAB
ALBUMIN SERPL ELPH-MCNC: 3.1 G/DL — LOW (ref 3.3–5)
ALP SERPL-CCNC: 91 U/L — SIGNIFICANT CHANGE UP (ref 40–120)
ALT FLD-CCNC: 21 U/L — SIGNIFICANT CHANGE UP (ref 4–41)
ANION GAP SERPL CALC-SCNC: 11 MMOL/L — SIGNIFICANT CHANGE UP (ref 7–14)
APTT BLD: 25 SEC — LOW (ref 27–36.3)
AST SERPL-CCNC: 21 U/L — SIGNIFICANT CHANGE UP (ref 4–40)
BASOPHILS # BLD AUTO: 0.04 K/UL — SIGNIFICANT CHANGE UP (ref 0–0.2)
BASOPHILS NFR BLD AUTO: 0.3 % — SIGNIFICANT CHANGE UP (ref 0–2)
BILIRUB SERPL-MCNC: 0.4 MG/DL — SIGNIFICANT CHANGE UP (ref 0.2–1.2)
BUN SERPL-MCNC: 20 MG/DL — SIGNIFICANT CHANGE UP (ref 7–23)
CALCIUM SERPL-MCNC: 7.7 MG/DL — LOW (ref 8.4–10.5)
CHLORIDE SERPL-SCNC: 98 MMOL/L — SIGNIFICANT CHANGE UP (ref 98–107)
CO2 SERPL-SCNC: 21 MMOL/L — LOW (ref 22–31)
COVID-19 SPIKE DOMAIN AB INTERP: POSITIVE
COVID-19 SPIKE DOMAIN ANTIBODY RESULT: >250 U/ML — HIGH
CREAT SERPL-MCNC: 0.92 MG/DL — SIGNIFICANT CHANGE UP (ref 0.5–1.3)
EOSINOPHIL # BLD AUTO: 0.03 K/UL — SIGNIFICANT CHANGE UP (ref 0–0.5)
EOSINOPHIL NFR BLD AUTO: 0.2 % — SIGNIFICANT CHANGE UP (ref 0–6)
GLUCOSE BLDC GLUCOMTR-MCNC: 236 MG/DL — HIGH (ref 70–99)
GLUCOSE BLDC GLUCOMTR-MCNC: 288 MG/DL — HIGH (ref 70–99)
GLUCOSE SERPL-MCNC: 249 MG/DL — HIGH (ref 70–99)
HCT VFR BLD CALC: 33.2 % — LOW (ref 39–50)
HGB BLD-MCNC: 11.3 G/DL — LOW (ref 13–17)
IANC: 10.14 K/UL — HIGH (ref 1.5–8.5)
IMM GRANULOCYTES NFR BLD AUTO: 0.9 % — SIGNIFICANT CHANGE UP (ref 0–1.5)
INR BLD: 1.16 RATIO — SIGNIFICANT CHANGE UP (ref 0.88–1.16)
LYMPHOCYTES # BLD AUTO: 1.11 K/UL — SIGNIFICANT CHANGE UP (ref 1–3.3)
LYMPHOCYTES # BLD AUTO: 8.9 % — LOW (ref 13–44)
MCHC RBC-ENTMCNC: 27 PG — SIGNIFICANT CHANGE UP (ref 27–34)
MCHC RBC-ENTMCNC: 34 GM/DL — SIGNIFICANT CHANGE UP (ref 32–36)
MCV RBC AUTO: 79.2 FL — LOW (ref 80–100)
MONOCYTES # BLD AUTO: 1.09 K/UL — HIGH (ref 0–0.9)
MONOCYTES NFR BLD AUTO: 8.7 % — SIGNIFICANT CHANGE UP (ref 2–14)
NEUTROPHILS # BLD AUTO: 10.14 K/UL — HIGH (ref 1.8–7.4)
NEUTROPHILS NFR BLD AUTO: 81 % — HIGH (ref 43–77)
NRBC # BLD: 0 /100 WBCS — SIGNIFICANT CHANGE UP
NRBC # FLD: 0 K/UL — SIGNIFICANT CHANGE UP
PLATELET # BLD AUTO: 249 K/UL — SIGNIFICANT CHANGE UP (ref 150–400)
POTASSIUM SERPL-MCNC: 3.8 MMOL/L — SIGNIFICANT CHANGE UP (ref 3.5–5.3)
POTASSIUM SERPL-SCNC: 3.8 MMOL/L — SIGNIFICANT CHANGE UP (ref 3.5–5.3)
PROT SERPL-MCNC: 6.8 G/DL — SIGNIFICANT CHANGE UP (ref 6–8.3)
PROTHROM AB SERPL-ACNC: 13.1 SEC — SIGNIFICANT CHANGE UP (ref 10.6–13.6)
RBC # BLD: 4.19 M/UL — LOW (ref 4.2–5.8)
RBC # FLD: 12.8 % — SIGNIFICANT CHANGE UP (ref 10.3–14.5)
SARS-COV-2 IGG+IGM SERPL QL IA: >250 U/ML — HIGH
SARS-COV-2 IGG+IGM SERPL QL IA: POSITIVE
SODIUM SERPL-SCNC: 130 MMOL/L — LOW (ref 135–145)
WBC # BLD: 12.52 K/UL — HIGH (ref 3.8–10.5)
WBC # FLD AUTO: 12.52 K/UL — HIGH (ref 3.8–10.5)

## 2021-10-01 PROCEDURE — 99233 SBSQ HOSP IP/OBS HIGH 50: CPT | Mod: GC

## 2021-10-01 PROCEDURE — 73720 MRI LWR EXTREMITY W/O&W/DYE: CPT | Mod: 26,LT

## 2021-10-01 RX ORDER — INSULIN GLARGINE 100 [IU]/ML
45 INJECTION, SOLUTION SUBCUTANEOUS
Refills: 0 | Status: DISCONTINUED | OUTPATIENT
Start: 2021-10-01 | End: 2021-10-02

## 2021-10-01 RX ORDER — KETOROLAC TROMETHAMINE 30 MG/ML
15 SYRINGE (ML) INJECTION ONCE
Refills: 0 | Status: DISCONTINUED | OUTPATIENT
Start: 2021-10-01 | End: 2021-10-01

## 2021-10-01 RX ORDER — COLLAGENASE CLOSTRIDIUM HIST. 250 UNIT/G
1 OINTMENT (GRAM) TOPICAL DAILY
Refills: 0 | Status: DISCONTINUED | OUTPATIENT
Start: 2021-10-01 | End: 2021-10-06

## 2021-10-01 RX ADMIN — INSULIN GLARGINE 40 UNIT(S): 100 INJECTION, SOLUTION SUBCUTANEOUS at 09:15

## 2021-10-01 RX ADMIN — Medication 2: at 18:02

## 2021-10-01 RX ADMIN — Medication 14 UNIT(S): at 18:03

## 2021-10-01 RX ADMIN — PIPERACILLIN AND TAZOBACTAM 25 GRAM(S): 4; .5 INJECTION, POWDER, LYOPHILIZED, FOR SOLUTION INTRAVENOUS at 13:55

## 2021-10-01 RX ADMIN — Medication 4: at 12:29

## 2021-10-01 RX ADMIN — Medication 5 MILLIGRAM(S): at 22:40

## 2021-10-01 RX ADMIN — Medication 15 MILLIGRAM(S): at 03:34

## 2021-10-01 RX ADMIN — Medication 14 UNIT(S): at 08:43

## 2021-10-01 RX ADMIN — AMLODIPINE BESYLATE 5 MILLIGRAM(S): 2.5 TABLET ORAL at 05:58

## 2021-10-01 RX ADMIN — GABAPENTIN 300 MILLIGRAM(S): 400 CAPSULE ORAL at 22:40

## 2021-10-01 RX ADMIN — Medication 650 MILLIGRAM(S): at 14:01

## 2021-10-01 RX ADMIN — Medication 14 UNIT(S): at 12:30

## 2021-10-01 RX ADMIN — Medication 50 MILLIGRAM(S): at 05:58

## 2021-10-01 RX ADMIN — PIPERACILLIN AND TAZOBACTAM 25 GRAM(S): 4; .5 INJECTION, POWDER, LYOPHILIZED, FOR SOLUTION INTRAVENOUS at 22:40

## 2021-10-01 RX ADMIN — ENOXAPARIN SODIUM 40 MILLIGRAM(S): 100 INJECTION SUBCUTANEOUS at 12:37

## 2021-10-01 RX ADMIN — Medication 650 MILLIGRAM(S): at 00:23

## 2021-10-01 RX ADMIN — Medication 15 MILLIGRAM(S): at 03:04

## 2021-10-01 RX ADMIN — Medication 650 MILLIGRAM(S): at 22:52

## 2021-10-01 RX ADMIN — PIPERACILLIN AND TAZOBACTAM 25 GRAM(S): 4; .5 INJECTION, POWDER, LYOPHILIZED, FOR SOLUTION INTRAVENOUS at 06:58

## 2021-10-01 RX ADMIN — Medication 650 MILLIGRAM(S): at 15:01

## 2021-10-01 RX ADMIN — Medication 1 APPLICATION(S): at 19:05

## 2021-10-01 RX ADMIN — INSULIN GLARGINE 45 UNIT(S): 100 INJECTION, SOLUTION SUBCUTANEOUS at 23:09

## 2021-10-01 RX ADMIN — Medication 4: at 08:42

## 2021-10-01 RX ADMIN — Medication 250 MILLIGRAM(S): at 13:59

## 2021-10-01 RX ADMIN — Medication 250 MILLIGRAM(S): at 02:49

## 2021-10-01 NOTE — PROGRESS NOTE ADULT - SUBJECTIVE AND OBJECTIVE BOX
PROGRESS NOTE:   Authored By: Kvng Cardoso MD     PGY-1, Internal Medicine  Pager: [phone], [phone]    Patient is a 66y old  Male who presents with a chief complaint of Foot abscess and Uncontrolled DM2 (01 Oct 2021 09:29)      OVERNIGHT EVENTS: No acute events overnight    SUBJECTIVE: Pt seen and examined at bedside this morning.     ADDITIONAL REVIEW OF SYSTEMS:    MEDICATIONS  (STANDING):  amLODIPine   Tablet 5 milliGRAM(s) Oral daily  collagenase Ointment 1 Application(s) Topical daily  dextrose 40% Gel 15 Gram(s) Oral once  dextrose 5%. 1000 milliLiter(s) (50 mL/Hr) IV Continuous <Continuous>  dextrose 5%. 1000 milliLiter(s) (100 mL/Hr) IV Continuous <Continuous>  dextrose 50% Injectable 25 Gram(s) IV Push once  dextrose 50% Injectable 12.5 Gram(s) IV Push once  dextrose 50% Injectable 25 Gram(s) IV Push once  enoxaparin Injectable 40 milliGRAM(s) SubCutaneous daily  gabapentin 300 milliGRAM(s) Oral at bedtime  glucagon  Injectable 1 milliGRAM(s) IntraMuscular once  glucagon  Injectable 1 milliGRAM(s) IntraMuscular once  influenza   Vaccine 0.5 milliLiter(s) IntraMuscular once  insulin glargine Injectable (LANTUS) 40 Unit(s) SubCutaneous two times a day  insulin lispro (ADMELOG) corrective regimen sliding scale   SubCutaneous three times a day before meals  insulin lispro (ADMELOG) corrective regimen sliding scale   SubCutaneous at bedtime  insulin lispro Injectable (ADMELOG) 14 Unit(s) SubCutaneous three times a day before meals  metoprolol succinate ER 50 milliGRAM(s) Oral daily  oxybutynin 5 milliGRAM(s) Oral at bedtime  piperacillin/tazobactam IVPB.. 3.375 Gram(s) IV Intermittent every 8 hours  vancomycin  IVPB 1000 milliGRAM(s) IV Intermittent every 12 hours    MEDICATIONS  (PRN):  acetaminophen   Tablet .. 650 milliGRAM(s) Oral every 6 hours PRN Temp greater or equal to 38C (100.4F), Mild Pain (1 - 3)  aluminum hydroxide/magnesium hydroxide/simethicone Suspension 30 milliLiter(s) Oral every 4 hours PRN Dyspepsia  melatonin 3 milliGRAM(s) Oral at bedtime PRN Insomnia  ondansetron Injectable 4 milliGRAM(s) IV Push every 8 hours PRN Nausea and/or Vomiting      CAPILLARY BLOOD GLUCOSE      POCT Blood Glucose.: 236 mg/dL (01 Oct 2021 08:33)  POCT Blood Glucose.: 288 mg/dL (01 Oct 2021 07:24)  POCT Blood Glucose.: 236 mg/dL (30 Sep 2021 22:25)  POCT Blood Glucose.: 264 mg/dL (30 Sep 2021 18:22)  POCT Blood Glucose.: 305 mg/dL (30 Sep 2021 16:18)  POCT Blood Glucose.: 404 mg/dL (30 Sep 2021 14:04)  POCT Blood Glucose.: 448 mg/dL (30 Sep 2021 12:46)    I&O's Summary      PHYSICAL EXAM:  Vital Signs Last 24 Hrs  T(C): 36.7 (01 Oct 2021 05:56), Max: 37.3 (30 Sep 2021 19:52)  T(F): 98.1 (01 Oct 2021 05:56), Max: 99.1 (30 Sep 2021 19:52)  HR: 82 (01 Oct 2021 05:56) (82 - 100)  BP: 132/83 (01 Oct 2021 05:56) (132/83 - 158/85)  BP(mean): --  RR: 16 (01 Oct 2021 05:56) (16 - 18)  SpO2: 100% (01 Oct 2021 05:56) (98% - 100%)    CONSTITUTIONAL: NAD, well-developed  HEENT: NC/AT, EOMI  RESPIRATORY: No increased work of breathing, CTAB, no wheezes or crackles appreciated  CARDIOVASCULAR: RRR, S1 and S2 present, no m/r/g  ABDOMEN: soft, NT, ND, bowel sounds present  EXTREMITIES: No LE edema  MUSCULOSKELETAL: no joint swelling or tenderness to palpation  NEURO: A&Ox3, moving all extremities    LABS:                        11.3   12.52 )-----------( 249      ( 01 Oct 2021 07:51 )             33.2     10-01    130<L>  |  98  |  20  ----------------------------<  249<H>  3.8   |  21<L>  |  0.92    Ca    7.7<L>      01 Oct 2021 07:51  Phos  2.1     09-30  Mg     2.50     09-30    TPro  6.8  /  Alb  3.1<L>  /  TBili  0.4  /  DBili  x   /  AST  21  /  ALT  21  /  AlkPhos  91  10-01    PT/INR - ( 01 Oct 2021 07:51 )   PT: 13.1 sec;   INR: 1.16 ratio         PTT - ( 01 Oct 2021 07:51 )  PTT:25.0 sec            RADIOLOGY & ADDITIONAL TESTS:    Results Reviewed:   Imaging Personally Reviewed:  Electrocardiogram Personally Reviewed:    COORDINATION OF CARE:  Care Discussed with Consultants/Other Providers [Y/N]:  Prior or Outpatient Records Reviewed [Y/N]:

## 2021-10-01 NOTE — PROGRESS NOTE ADULT - SUBJECTIVE AND OBJECTIVE BOX
Patient is a 66y old  Male who presents with a chief complaint of Foot wound and Uncontrolled DM2 (30 Sep 2021 14:59)       INTERVAL HPI/OVERNIGHT EVENTS:  Patient seen and evaluated at bedside.  Pt is resting comfortable in NAD. Denies N/V/F/C.  Pain rated at X/10    Allergies    No Known Allergies    Intolerances        Vital Signs Last 24 Hrs  T(C): 36.7 (01 Oct 2021 05:56), Max: 37.3 (30 Sep 2021 19:52)  T(F): 98.1 (01 Oct 2021 05:56), Max: 99.1 (30 Sep 2021 19:52)  HR: 82 (01 Oct 2021 05:56) (82 - 100)  BP: 132/83 (01 Oct 2021 05:56) (132/83 - 158/85)  BP(mean): --  RR: 16 (01 Oct 2021 05:56) (16 - 18)  SpO2: 100% (01 Oct 2021 05:56) (98% - 100%)    LABS:                        13.1   14.70 )-----------( 350      ( 30 Sep 2021 12:11 )             37.4     09-30    127<L>  |  93<L>  |  24<H>  ----------------------------<  397<H>  5.4<H>   |  19<L>  |  0.82    Ca    8.3<L>      30 Sep 2021 14:28  Phos  2.1     09-30  Mg     2.50     09-30    TPro  7.4  /  Alb  3.2<L>  /  TBili  0.4  /  DBili  x   /  AST  27  /  ALT  22  /  AlkPhos  105  09-30        CAPILLARY BLOOD GLUCOSE      POCT Blood Glucose.: 288 mg/dL (01 Oct 2021 07:24)  POCT Blood Glucose.: 236 mg/dL (30 Sep 2021 22:25)  POCT Blood Glucose.: 264 mg/dL (30 Sep 2021 18:22)  POCT Blood Glucose.: 305 mg/dL (30 Sep 2021 16:18)  POCT Blood Glucose.: 404 mg/dL (30 Sep 2021 14:04)  POCT Blood Glucose.: 448 mg/dL (30 Sep 2021 12:46)  POCT Blood Glucose.: 576 mg/dL (30 Sep 2021 09:58)      Lower Extremity Physical Exam:  Vascular: DP/PT 2/4, B/L, CFT <3 seconds B/L, Temperature gradient warm to warm B/L.   Neuro: Epicritic sensation intact to the level of digits, B/L.  Musculoskeletal/Ortho: unremarkable  Skin: left foot plantar hallux eschar with periwound healthy dermis, no drainage, no purulence, no malodor, edema noted to L ankle and lower leg with redness and warmth    RADIOLOGY & ADDITIONAL TESTS:   Patient is a 66y old  Male who presents with a chief complaint of Foot wound and Uncontrolled DM2 (30 Sep 2021 14:59)       INTERVAL HPI/OVERNIGHT EVENTS:  Patient seen and evaluated at bedside.  Pt is resting comfortable in NAD. Denies N/V/F/C.  Pain rated at X/10    Allergies    No Known Allergies    Intolerances        Vital Signs Last 24 Hrs  T(C): 36.7 (01 Oct 2021 05:56), Max: 37.3 (30 Sep 2021 19:52)  T(F): 98.1 (01 Oct 2021 05:56), Max: 99.1 (30 Sep 2021 19:52)  HR: 82 (01 Oct 2021 05:56) (82 - 100)  BP: 132/83 (01 Oct 2021 05:56) (132/83 - 158/85)  BP(mean): --  RR: 16 (01 Oct 2021 05:56) (16 - 18)  SpO2: 100% (01 Oct 2021 05:56) (98% - 100%)    LABS:                        13.1   14.70 )-----------( 350      ( 30 Sep 2021 12:11 )             37.4     09-30    127<L>  |  93<L>  |  24<H>  ----------------------------<  397<H>  5.4<H>   |  19<L>  |  0.82    Ca    8.3<L>      30 Sep 2021 14:28  Phos  2.1     09-30  Mg     2.50     09-30    TPro  7.4  /  Alb  3.2<L>  /  TBili  0.4  /  DBili  x   /  AST  27  /  ALT  22  /  AlkPhos  105  09-30        CAPILLARY BLOOD GLUCOSE      POCT Blood Glucose.: 288 mg/dL (01 Oct 2021 07:24)  POCT Blood Glucose.: 236 mg/dL (30 Sep 2021 22:25)  POCT Blood Glucose.: 264 mg/dL (30 Sep 2021 18:22)  POCT Blood Glucose.: 305 mg/dL (30 Sep 2021 16:18)  POCT Blood Glucose.: 404 mg/dL (30 Sep 2021 14:04)  POCT Blood Glucose.: 448 mg/dL (30 Sep 2021 12:46)  POCT Blood Glucose.: 576 mg/dL (30 Sep 2021 09:58)      Lower Extremity Physical Exam:  Vascular: DP/PT 2/4, B/L, CFT <3 seconds B/L, Temperature gradient warm to warm B/L.   Neuro: Epicritic sensation intact to the level of digits, B/L.  Musculoskeletal/Ortho: unremarkable  Skin: left foot plantar hallux eschar with periwound healthy dermis, no drainage, no purulence, no malodor, edema noted to L ankle and lower leg with redness and warmth    RADIOLOGY & ADDITIONAL TESTS:    < from: MR Foot w/wo IV Cont, Left (10.01.21 @ 10:45) >    EXAM:  MR FOOT WAW IC LT        PROCEDURE DATE:  Oct  1 2021         INTERPRETATION:  EXAMINATION: MR FOOT WITHOUT AND WITH IV CONTRAST LEFT    CLINICAL INDICATION:infection, ankle injury    COMPARISON: Radiographs dated 9/30/2021 of the left foot and ankle    TECHNIQUE: Multiplanar, multi-sequence MRI of the left foot was performed without and with intravenous contrast. 7.5 mL Gadavist was administered and 0 mL was discarded.    INTERPRETATION:    Bones: There is a localized wound at the plantar aspect of the first digit at the level of the distal phalanx medially. Underlying the wound, there is no evidence of osteomyelitis.    Soft Tissues: Soft tissue phlegmon is present underlying the described wound at the plantar first digit. There isno evidence of well-defined abscess. Dorsal foot soft tissue edema is present. There is hyperintensity of the intrinsic muscles of the foot which may be seen in neuropathy.    IMPRESSION:  Soft tissue wound at the plantar aspect of the first digit with underlying phlegmon. No well-defined soft tissue abscess or osteomyelitis.    --- End of Report ---              SHLOMIT GOLDBERG-STEIN MD; Attending Radiologist  This document has been electronically signed. Oct  1 2021 11:16AM    < end of copied text >

## 2021-10-01 NOTE — DISCHARGE NOTE PROVIDER - NSDCCPTREATMENT_GEN_ALL_CORE_FT
PRINCIPAL PROCEDURE  Procedure: MRI left foot  Findings and Treatment:   IMPRESSION:  Soft tissue wound at the plantar aspect of the first digit with underlying phlegmon. No well-defined soft tissue abscess or osteomyelitis.        SECONDARY PROCEDURE  Procedure: Xray of right foot, three weight-bearing views  Findings and Treatment:   THIS IS AN XRAY OF YOUR LEFT FOOT. THE EMR IS IMPROPERLY LISTED AS RIGHT, PLEASE IGNORE.  IMPRESSION:  Generalized soft tissue swelling and ankle joint effusion. No tracking soft tissue gas collections, radiopaque foreign bodies, or gross radiographic evidence for osteomyelitis.  No fractures or dislocations.  Congruent ankle mortise with smooth and intact talar dome. Tarsometatarsal alignment maintained without evidence for a Lisfranc injury.  Preserved visualized joint spaces and no joint margin erosions.  Posterosuperior calcaneal Carey deformity and small plantar and posterior calcaneal enthesophytes.  No discrete suspicious lytic or blastic lesions.  Nonspecific focal small nodular dystrophic calcification in lower anterior leg subcutaneous tissues.  --- End of Report ---< from: Xray Ankle 2 Views, Left (09.30.21 @ 13:48) >      Procedure: Chest xray, PA & lateral  Findings and Treatment:   INTERPRETATION:  CLINICAL INFORMATION: Fatigue  TECHNIQUE: Frontal radiograph of the chest  COMPARISON: Chest CT 5/27/2019  FINDINGS:  Clear lungs. No pleural effusion. No pneumothorax. Cardiac silhouette size cannot be accurately assessed on this projection. No acute osseous findings.  IMPRESSION:  Clear lungs  --- End of Report ---< from: Xray Chest 1 View- PORTABLE-Urgent (Xray Chest 1 View- PORTABLE-Urgent .) (09.30.21 @ 12:40) >       PRINCIPAL PROCEDURE  Procedure: MRI left foot  Findings and Treatment:   IMPRESSION:  Soft tissue wound at the plantar aspect of the first digit with underlying phlegmon. No well-defined soft tissue abscess or osteomyelitis.        SECONDARY PROCEDURE  Procedure: Xray of right foot, three weight-bearing views  Findings and Treatment:   THIS IS AN XRAY OF YOUR LEFT FOOT. THE EMR IS IMPROPERLY LISTED AS RIGHT, PLEASE IGNORE.  IMPRESSION:  Generalized soft tissue swelling and ankle joint effusion. No tracking soft tissue gas collections, radiopaque foreign bodies, or gross radiographic evidence for osteomyelitis.  No fractures or dislocations.  Congruent ankle mortise with smooth and intact talar dome. Tarsometatarsal alignment maintained without evidence for a Lisfranc injury.  Preserved visualized joint spaces and no joint margin erosions.  Posterosuperior calcaneal Carey deformity and small plantar and posterior calcaneal enthesophytes.  No discrete suspicious lytic or blastic lesions.  Nonspecific focal small nodular dystrophic calcification in lower anterior leg subcutaneous tissues.  --- End of Report ---< from: Xray Ankle 2 Views, Left (09.30.21 @ 13:48) >      Procedure: Chest xray, PA & lateral  Findings and Treatment:   INTERPRETATION:  CLINICAL INFORMATION: Fatigue  TECHNIQUE: Frontal radiograph of the chest  COMPARISON: Chest CT 5/27/2019  FINDINGS:  Clear lungs. No pleural effusion. No pneumothorax. Cardiac silhouette size cannot be accurately assessed on this projection. No acute osseous findings.  IMPRESSION:  Clear lungs  --- End of Report ---< from: Xray Chest 1 View- PORTABLE-Urgent (Xray Chest 1 View- PORTABLE-Urgent .) (09.30.21 @ 12:40) >      Procedure: Ultrasound of left lower extremity for deep vein thrombosis (DVT)  Findings and Treatment: ------------------------------------------------------------------------  Summary/Impressions:  1.  No evidence of deep vein thrombosis in the right and  left lower extremities.  2.  No evidence of deep and superficial venous  insufficiency noted in the right and left lower  extremities.

## 2021-10-01 NOTE — PROGRESS NOTE ADULT - PROBLEM SELECTOR PLAN 1
Infectious in the setting of poorly controlled DM2 s/p trauma --> open wound. Pt denies fevers, chills, or other systemic complaints. Likely a localized condition at this time with less concern for osteomyllitis as wound does not probe to the bone as per podiatrist Dr. Thao.     -IV vancomycin weight based dosing (9/30-)  -NS fluids 150/cc for 3L total   -control hyperglycemia as listed below  -F/u MRI to rule out osteomylitis.   -f/u wound culture taken in outpatient podiatry setting  -

## 2021-10-01 NOTE — PROGRESS NOTE ADULT - ATTENDING COMMENTS
65 yo M with hx of HTN, HLD, uncontrolled DM here for infected L foot ulcer with associated cellulitis.   -start vanco/zosyn for now. f/u blood cx.   -MR foot showed Soft tissue wound at the plantar aspect of the first digit with underlying phlegmon  -podiatry consulted, will f/u recs   -FS>500 on admission, no e/o DKA. FS 200s today. received lantus 40 and NPH 20 last night. will increase lantus to 45 bid. c/w lispro 14 premeal and ss  -ARB held on admission d/t hyperK, now resolved. will resume 65 yo M with hx of HTN, HLD, uncontrolled DM here for infected L foot ulcer with associated cellulitis.   -still febrile this pm. repeat blood sent. wound cx at private podiatrist office has not resulted yet.   -c/w vanco/zosyn for now.   -MR foot showed Soft tissue wound at the plantar aspect of the first digit with underlying phlegmon  -podiatry consulted, no indication for debridement for now   -FS>500 on admission, no e/o DKA. FS 200s today. received lantus 40 and NPH 20 last night. will increase lantus to 45 bid. c/w lispro 14 premeal and ss  -ARB held on admission d/t hyperK, now resolved. will resume

## 2021-10-01 NOTE — DISCHARGE NOTE PROVIDER - NSDCFUADDINST_GEN_ALL_CORE_FT
Podiatry Discharge Instructions:  Follow up: Please follow up with Dr. Thao within 1 week of discharge from the hospital, please call [phone] for appointment and discuss that you recently were seen in the hospital.  Wound Care: Please apply Santyl to the left big toe wound followed by 4x4 gauze and pallavi daily.   Weight bearing: Please weight bear as tolerated to the left heel in a surgical shoe.  Antibiotics: Please continue as instructed. Podiatry Discharge Instructions:  Follow up: Please follow up with Dr. Thao within 1 week of discharge from the hospital, please call [phone] for appointment and discuss that you recently were seen in the hospital.  Wound Care: Please apply 1/4 inch packing to the left big toe wound followed by 4x4 gauze and pallavi daily.   Weight bearing: Please weight bear as tolerated to the left heel in a surgical shoe.  Antibiotics: Please continue as instructed.

## 2021-10-01 NOTE — DISCHARGE NOTE PROVIDER - NSDCCPCAREPLAN_GEN_ALL_CORE_FT
PRINCIPAL DISCHARGE DIAGNOSIS  Diagnosis: Abscess of plantar aspect of foot  Assessment and Plan of Treatment: You came in for an infection of your foot due to a traumatic injury which was worsened by poorly controlled diabetes. We treated this using IV antibiotics, vancomycin and zosyn. We also controlled your blood sugar with insulin. In the future, always check your feet for any wounds, clean the site of injury well using water and soap, and keep good control of your diabetes.   If the swelling or pain in your leg worsens, or you develop a fever that does not respond to tylenol, please return to the ED.   Follow up with your PCP in 1-2 weeks and your podiatrist in 1 week to discuss this hospital course.      SECONDARY DISCHARGE DIAGNOSES  Diagnosis: Diabetes  Assessment and Plan of Treatment: Your blood sugar was very high when you came to the ED. This is in the setting of poor medication compliance and foot infection. However, your Hgb A1C level, which is a 3month indicator of blood sugar, was also elevated, indicating poor diabetes control chronically. Pleae see your endocrinologist or a [facility] endocrinologist in 1-2 weeks to discuss how to better manage your diabetes and maintain a healthy blood glucose level.,    Diagnosis: HTN (hypertension)  Assessment and Plan of Treatment: You have a history of high blood pressure which you take medications for. In hospital, your blood pressure medication was cotrolled using amlodipine alone. Please continue all your home medications upon disharge. Please go to your PCP in 1-2 weeks to make sure your blood pressure continues to be controlled.    Diagnosis: Hyperlipidemia  Assessment and Plan of Treatment: We treated your high cholesterol by using your at home statin medication. Please continue this medication upon discharge.     PRINCIPAL DISCHARGE DIAGNOSIS  Diagnosis: Abscess of plantar aspect of foot  Assessment and Plan of Treatment: You came in for an infection of your foot due to a traumatic injury which was worsened by poorly controlled diabetes. We treated this using IV antibiotics, vancomycin and zosyn. We also controlled your blood sugar with insulin. In the future, always check your feet for any wounds, clean the site of injury well using water and soap, and keep good control of your diabetes.   If the swelling or pain in your leg worsens, or you develop a fever that does not respond to tylenol, please return to the ED.   Follow up with your PCP in 1-2 weeks and your podiatrist in 1 week to discuss this hospital course.      SECONDARY DISCHARGE DIAGNOSES  Diagnosis: Diabetes  Assessment and Plan of Treatment: Your blood sugar was very high when you came to the ED. This is in the setting of poor medication compliance and foot infection. However, your Hgb A1C level, which is a 3month indicator of blood sugar, was also elevated, indicating poor diabetes control chronically. Pleae see your endocrinologist or a [facility] endocrinologist in 1-2 weeks to discuss how to better manage your diabetes and maintain a healthy blood glucose level.,    Diagnosis: HTN (hypertension)  Assessment and Plan of Treatment: You have a history of high blood pressure which you take medications for. In hospital, your blood pressure medication was cotrolled using amlodipine and valsartan. Please continue all your home blood pressure medications upon discharge. Please go to your PCP in 1-2 weeks to make sure your blood pressure continues to be controlled.    Diagnosis: Hyperlipidemia  Assessment and Plan of Treatment: We treated your high cholesterol by using your at home statin medication. Please continue this medication upon discharge.     PRINCIPAL DISCHARGE DIAGNOSIS  Diagnosis: Abscess of plantar aspect of foot  Assessment and Plan of Treatment: You came in for an infection of your foot due to a traumatic injury which was worsened by poorly controlled diabetes. We treated this using IV antibiotics, vancomycin and zosyn. We also controlled your blood sugar with insulin. In the future, always check your feet for any wounds, clean the site of injury well using water and soap, and keep good control of your diabetes.   If the swelling or pain in your leg worsens, or you develop a fever that does not respond to tylenol, please return to the ED.   Follow up with your PCP in 1-2 weeks and your podiatrist in 1 week to discuss this hospital course.      SECONDARY DISCHARGE DIAGNOSES  Diagnosis: Diabetes  Assessment and Plan of Treatment: Your blood sugar was very high when you came to the ED. This is in the setting of poor medication compliance and foot infection. However, your Hgb A1C level, which is a 3month indicator of blood sugar, was also elevated, indicating poor diabetes control chronically. Please continue to take 55u of semglee twice a day, and admelog 20u three times a day before meals, as you were doing at home.    Pleae see your endocrinologist or a [facility] endocrinologist in 1-2 weeks to discuss how to better manage your diabetes and maintain a healthy blood glucose level.,    Diagnosis: HTN (hypertension)  Assessment and Plan of Treatment: You have a history of high blood pressure which you take medications for. In hospital, your blood pressure medication was cotrolled using amlodipine and valsartan. Please continue all your home blood pressure medications upon discharge. Please go to your PCP in 1-2 weeks to make sure your blood pressure continues to be controlled.    Diagnosis: Hyperlipidemia  Assessment and Plan of Treatment: We treated your high cholesterol by using your at home statin medication. Please continue this medication upon discharge.     PRINCIPAL DISCHARGE DIAGNOSIS  Diagnosis: Abscess of plantar aspect of foot  Assessment and Plan of Treatment: You came in for an infection of your foot due to a traumatic injury which was worsened by poorly controlled diabetes. We treated this using IV antibiotics, vancomycin and zosyn. We also controlled your blood sugar with insulin. In the future, always check your feet for any wounds, clean the site of injury well using water and soap, and keep good control of your diabetes.   If the swelling or pain in your leg worsens, or you develop a fever  please alert your PMD or podiatrist.   Follow up with your PCP in 1-2 weeks and your podiatrist in 1 week to discuss this hospital course.      SECONDARY DISCHARGE DIAGNOSES  Diagnosis: Diabetes  Assessment and Plan of Treatment: Your blood sugar was very high when you came to the ED. This is in the setting of poor medication compliance and foot infection. However, your Hgb A1C level, which is a 3month indicator of blood sugar, was also elevated, indicating poor diabetes control chronically. Please continue to take 55u of semglee twice a day, and admelog 20u three times a day before meals, as you were doing at home.    Pleae see your endocrinologist or a [facility] endocrinologist in 1-2 weeks to discuss how to better manage your diabetes and maintain a healthy blood glucose level.,    Diagnosis: HTN (hypertension)  Assessment and Plan of Treatment: You have a history of high blood pressure which you take medications for. In hospital, your blood pressure medication was cotrolled using amlodipine and valsartan. Please continue all your home blood pressure medications upon discharge. Please go to your PCP in 1-2 weeks to make sure your blood pressure continues to be controlled.    Diagnosis: Hyperlipidemia  Assessment and Plan of Treatment: We treated your high cholesterol by using your at home statin medication. Please continue this medication upon discharge.

## 2021-10-01 NOTE — DISCHARGE NOTE PROVIDER - NSDCMRMEDTOKEN_GEN_ALL_CORE_FT
Admelog 100 units/mL injectable solution: 18 unit(s) injectable 3 times a day  amLODIPine 5 mg oral tablet: 1 tab(s) orally once a day  Azithromycin 3 Day Dose Pack 500 mg oral tablet: 1 tab(s) orally once a day  doxycycline hyclate 100 mg oral capsule: 1 cap(s) orally 2 times a day  gabapentin 300 mg oral capsule: 1 cap(s) orally once a day (at bedtime)  Lipitor 40 mg oral tablet: 1 tab(s) orally once a day  metFORMIN 500 mg oral tablet, extended release: 1 tab(s) orally 2 times a day  metoprolol succinate 50 mg oral tablet, extended release: 1 tab(s) orally once a day  oxybutynin 5 mg/24 hours oral tablet, extended release: 1 tab(s) orally once a day  Semglee Prefilled Pen 100 units/mL subcutaneous solution: 55 unit(s) subcutaneous 2 times a day   Admelog 100 units/mL injectable solution: 18 unit(s) injectable 3 times a day  amLODIPine 5 mg oral tablet: 1 tab(s) orally once a day  Azithromycin 3 Day Dose Pack 500 mg oral tablet: 1 tab(s) orally once a day  doxycycline hyclate 100 mg oral capsule: 1 cap(s) orally 2 times a day  gabapentin 300 mg oral capsule: 1 cap(s) orally once a day (at bedtime)  Lipitor 40 mg oral tablet: 1 tab(s) orally once a day  metFORMIN 500 mg oral tablet, extended release: 1 tab(s) orally 2 times a day  metoprolol succinate 50 mg oral tablet, extended release: 1 tab(s) orally once a day  oxybutynin 5 mg/24 hours oral tablet, extended release: 1 tab(s) orally once a day  Rolling Walker: Arden Edmondson  ICD 10: L02.61  Nika: 99 mo  Semglee Prefilled Pen 100 units/mL subcutaneous solution: 55 unit(s) subcutaneous 2 times a day   Admelog 100 units/mL injectable solution: 18 unit(s) injectable 3 times a day  amLODIPine 5 mg oral tablet: 1 tab(s) orally once a day  gabapentin 300 mg oral capsule: 1 cap(s) orally 3 times a day  Lipitor 40 mg oral tablet: 1 tab(s) orally once a day  metFORMIN 500 mg oral tablet, extended release: 1 tab(s) orally 2 times a day  metoprolol succinate 50 mg oral tablet, extended release: 1 tab(s) orally once a day  oxybutynin 5 mg/24 hours oral tablet, extended release: 1 tab(s) orally once a day  Rolling Walker: Arden Edmondson  ICD 10: L02.61  Nika: 99 mo  Semglee Prefilled Pen 100 units/mL subcutaneous solution: 55 unit(s) subcutaneous 2 times a day  valsartan 320 mg oral tablet: 1 tab(s) orally once a day   Admelog 100 units/mL injectable solution: 20 unit(s) injectable 3 times a day  amLODIPine 5 mg oral tablet: 1 tab(s) orally once a day  Augmentin 875 mg-125 mg oral tablet: 1 tab(s) orally 2 times a day   gabapentin 300 mg oral capsule: 1 cap(s) orally 3 times a day  Lipitor 40 mg oral tablet: 1 tab(s) orally once a day  metFORMIN 500 mg oral tablet, extended release: 1 tab(s) orally 2 times a day  metoprolol succinate 50 mg oral tablet, extended release: 1 tab(s) orally once a day  oxybutynin 5 mg/24 hours oral tablet, extended release: 1 tab(s) orally once a day  oxyCODONE 5 mg oral tablet: 0.5 tab(s) orally every 6 hours, As Needed MDD:2 tabs   Arden Edmondson: Arden Edmondson  ICD 10: L02.61  Nika: 99 mo  Semglee Prefilled Pen 100 units/mL subcutaneous solution: 55 unit(s) subcutaneous 2 times a day  valsartan 320 mg oral tablet: 1 tab(s) orally once a day

## 2021-10-01 NOTE — DISCHARGE NOTE PROVIDER - CARE PROVIDER_API CALL
Donell Thao (DPM)  Surgery Orthopaedic Surgery  [address]  [place] [address]  Phone: [phone]  Fax: [phone]  Established Patient  Follow Up Time: 1 week

## 2021-10-01 NOTE — PROGRESS NOTE ADULT - ASSESSMENT
66M with left foot wound and ankle swelling 2/2 fall  - patient seen and evaluated  - afebrile, WBC 14.7  - LF/AXR: No gas, no OM, no fractures  - JASON: left foot plantar hallux eschar with periwound healthy dermis, no drainage, no purulence, no malodor, edema noted to L ankle and lower leg with redness and warmth  - Continue IV ABx  - LF MR pending to r/o any soft tissue injury  - Rx: Santyl for left hallux   - pod plan for [facility] with IV antibiotics   - Seen with attending 66M with left foot wound and ankle swelling 2/2 fall  - patient seen and evaluated  - afebrile, WBC 14.7  - LF/AXR: No gas, no OM, no fractures  - JASON: left foot plantar hallux eschar with periwound healthy dermis, no drainage, no purulence, no malodor, edema noted to L ankle and lower leg with redness and warmth  - Continue IV ABx for 1-2 days  - LF MR: no indication for soft tissue injury to ankle or OM  - Rx: Santyl for left hallux   - Patient stable for discharge from podiatry standpoint   - f/u instructions and information can be found in the f/u section of the discharge provider note   - Seen with attending 66M with left foot wound and ankle swelling 2/2 fall  - patient seen and evaluated  - afebrile, WBC 14.7  - LF/AXR: No gas, no OM, no fractures  - JASON: left foot plantar hallux eschar with periwound healthy dermis, no drainage, no purulence, no malodor, edema noted to L ankle and lower leg with redness and warmth  - Continue IV ABx for 1-2 days  - LF MR: no indication for soft tissue injury to ankle or OM  - Rx: Santyl for left hallux   - Recommend PO Augmentin for 10 days upon discharge   - Patient stable for discharge from podiatry standpoint   - f/u instructions and information can be found in the f/u section of the discharge provider note   - Seen with attending

## 2021-10-01 NOTE — DISCHARGE NOTE PROVIDER - HOSPITAL COURSE
65 y/o male with sig med hx of DMII, with past admission in 2019 for DKA, presents to the ED with 3 days of worsening foot abscess and possible soft tissue infection s/p falling off a few steps on a ladder and stubbing his toe. Patient reports over the next few days he noticed discharge and worsening pain to toe. Patient was seen by podiatrist Donell Thao yesterday who debrided the wound, drained an abscess and noted he DID NOT probe to the bone, also notes erythema and pain to the ankle. Dr. Thao sent the pt to the ED with an oral dose of doxycycline 100 BID for coverage until pt can get IV empiric abx and admission. Patient reports he has been "tired" for the last week and has not taken his medications (namely insulin). Patient denies chest pain, Shortness of Breath, abdominal pain, Nausea/Vomiting/Diarrhea, dizziness, weakness, confusion, vision changes, urinary symptoms, syncope, falls, fevers    Of note on presentation, pt was hyperglycemic to the 500s, Na of 125 (uncorrected), K of 5.4 (slightly hemolyzed), slight anion gap to 16, but not acidotic. Once admitted the patient was started on vancomycin and zosyn. The pt had both an xray and MRI of the lower extremity, which the results we have included in this d/c summary. The patient's imaging was not concerning for osteomyelitis.    The pt's BG was controlled with basal insulin and a sliding scale. The  patient had a one time fever to 101 which responded to tylenol and where blood cultures showed no growth until date. The patient is now hemodynamically stable, his foot pain and swelling improved, and ready to be discharged to home on oral antibiotics. 67 y/o male with sig med hx of DMII, with past admission in 2019 for DKA, presents to the ED with 3 days of worsening foot abscess and possible soft tissue infection s/p falling off a few steps on a ladder and stubbing his toe. Patient reports over the next few days he noticed discharge and worsening pain to toe. Patient was seen by podiatrist Donell Thao yesterday who debrided the wound, drained an abscess and noted he DID NOT probe to the bone, also notes erythema and pain to the ankle. Dr. Thao sent the pt to the ED with an oral dose of doxycycline 100 BID for coverage until pt can get IV empiric abx and admission. Patient reports he has been "tired" for the last week and has not taken his medications (namely insulin). Patient denies chest pain, Shortness of Breath, abdominal pain, Nausea/Vomiting/Diarrhea, dizziness, weakness, confusion, vision changes, urinary symptoms, syncope, falls, fevers    Of note on presentation, pt was hyperglycemic to the 500s, Na of 125 (uncorrected), K of 5.4 (slightly hemolyzed), slight anion gap to 16, but not acidotic. Once admitted the patient was started on vancomycin and zosyn. The pt had both an xray and MRI of the lower extremity, which the results we have included in this d/c summary. The patient's imaging was not concerning for osteomyelitis. The patient also had a DVT ultrasound study on his lower left leg which was negative for DVTs.     The pt's BG was controlled with basal insulin and a sliding scale. The  patient had a one time fever to 101 which responded to tylenol and where blood cultures showed no growth until date. The patient is now hemodynamically stable, his foot pain and swelling improved, and ready to be discharged to home on oral antibiotics.

## 2021-10-01 NOTE — DISCHARGE NOTE PROVIDER - NSDCFUADDAPPT_GEN_ALL_CORE_FT
Please see your PCP in 1-2 weeks to discuss this hospital visit    Please see your podiatrist Dr. Thao in 1 week to discuss your foot health.    Please see an endocrinologist, I have included the contact of [facility], to better control your blood sugar long term. DO this in 1-2 weeks.

## 2021-10-01 NOTE — DISCHARGE NOTE PROVIDER - NSFOLLOWUPCLINICS_GEN_ALL_ED_FT
[facility]  Endocrinology  [address] [place] [address]  Phone: [phone]  Fax:   Follow Up Time: 2 weeks

## 2021-10-01 NOTE — PHYSICAL THERAPY INITIAL EVALUATION ADULT - PHYSICAL ASSIST/NONPHYSICAL ASSIST: SIT/SUPINE, REHAB EVAL
Is This A New Presentation, Or A Follow-Up?: Skin Lesion How Severe Is Your Skin Lesion?: mild Has Your Skin Lesion Been Treated?: not been treated supervision

## 2021-10-02 LAB
A1C WITH ESTIMATED AVERAGE GLUCOSE RESULT: 12.2 % — HIGH (ref 4–5.6)
ALBUMIN SERPL ELPH-MCNC: 3 G/DL — LOW (ref 3.3–5)
ALBUMIN SERPL ELPH-MCNC: 3.1 G/DL — LOW (ref 3.3–5)
ALP SERPL-CCNC: 128 U/L — HIGH (ref 40–120)
ALP SERPL-CCNC: 129 U/L — HIGH (ref 40–120)
ALT FLD-CCNC: 30 U/L — SIGNIFICANT CHANGE UP (ref 4–41)
ALT FLD-CCNC: 34 U/L — SIGNIFICANT CHANGE UP (ref 4–41)
ANION GAP SERPL CALC-SCNC: 12 MMOL/L — SIGNIFICANT CHANGE UP (ref 7–14)
ANION GAP SERPL CALC-SCNC: 18 MMOL/L — HIGH (ref 7–14)
AST SERPL-CCNC: 42 U/L — HIGH (ref 4–40)
AST SERPL-CCNC: 42 U/L — HIGH (ref 4–40)
B-OH-BUTYR SERPL-SCNC: <0 MMOL/L — SIGNIFICANT CHANGE UP (ref 0–0.4)
BILIRUB SERPL-MCNC: 0.3 MG/DL — SIGNIFICANT CHANGE UP (ref 0.2–1.2)
BILIRUB SERPL-MCNC: 0.4 MG/DL — SIGNIFICANT CHANGE UP (ref 0.2–1.2)
BUN SERPL-MCNC: 15 MG/DL — SIGNIFICANT CHANGE UP (ref 7–23)
BUN SERPL-MCNC: 18 MG/DL — SIGNIFICANT CHANGE UP (ref 7–23)
CALCIUM SERPL-MCNC: 8.1 MG/DL — LOW (ref 8.4–10.5)
CALCIUM SERPL-MCNC: 8.2 MG/DL — LOW (ref 8.4–10.5)
CHLORIDE SERPL-SCNC: 96 MMOL/L — LOW (ref 98–107)
CHLORIDE SERPL-SCNC: 97 MMOL/L — LOW (ref 98–107)
CO2 SERPL-SCNC: 19 MMOL/L — LOW (ref 22–31)
CO2 SERPL-SCNC: 21 MMOL/L — LOW (ref 22–31)
CREAT SERPL-MCNC: 0.83 MG/DL — SIGNIFICANT CHANGE UP (ref 0.5–1.3)
CREAT SERPL-MCNC: 0.89 MG/DL — SIGNIFICANT CHANGE UP (ref 0.5–1.3)
ESTIMATED AVERAGE GLUCOSE: 303 — SIGNIFICANT CHANGE UP
GLUCOSE SERPL-MCNC: 275 MG/DL — HIGH (ref 70–99)
GLUCOSE SERPL-MCNC: 67 MG/DL — LOW (ref 70–99)
HCT VFR BLD CALC: 32.4 % — LOW (ref 39–50)
HGB BLD-MCNC: 10.9 G/DL — LOW (ref 13–17)
LACTATE SERPL-SCNC: 1.4 MMOL/L — SIGNIFICANT CHANGE UP (ref 0.5–2)
MAGNESIUM SERPL-MCNC: 2.2 MG/DL — SIGNIFICANT CHANGE UP (ref 1.6–2.6)
MCHC RBC-ENTMCNC: 27.2 PG — SIGNIFICANT CHANGE UP (ref 27–34)
MCHC RBC-ENTMCNC: 33.6 GM/DL — SIGNIFICANT CHANGE UP (ref 32–36)
MCV RBC AUTO: 80.8 FL — SIGNIFICANT CHANGE UP (ref 80–100)
MRSA PCR RESULT.: SIGNIFICANT CHANGE UP
NRBC # BLD: 0 /100 WBCS — SIGNIFICANT CHANGE UP
NRBC # FLD: 0 K/UL — SIGNIFICANT CHANGE UP
PHOSPHATE SERPL-MCNC: 2.2 MG/DL — LOW (ref 2.5–4.5)
PLATELET # BLD AUTO: 272 K/UL — SIGNIFICANT CHANGE UP (ref 150–400)
POTASSIUM SERPL-MCNC: 3.9 MMOL/L — SIGNIFICANT CHANGE UP (ref 3.5–5.3)
POTASSIUM SERPL-MCNC: 4.5 MMOL/L — SIGNIFICANT CHANGE UP (ref 3.5–5.3)
POTASSIUM SERPL-SCNC: 3.9 MMOL/L — SIGNIFICANT CHANGE UP (ref 3.5–5.3)
POTASSIUM SERPL-SCNC: 4.5 MMOL/L — SIGNIFICANT CHANGE UP (ref 3.5–5.3)
PROCALCITONIN SERPL-MCNC: 0.25 NG/ML — HIGH (ref 0.02–0.1)
PROT SERPL-MCNC: 7 G/DL — SIGNIFICANT CHANGE UP (ref 6–8.3)
PROT SERPL-MCNC: 7.3 G/DL — SIGNIFICANT CHANGE UP (ref 6–8.3)
RBC # BLD: 4.01 M/UL — LOW (ref 4.2–5.8)
RBC # FLD: 13.1 % — SIGNIFICANT CHANGE UP (ref 10.3–14.5)
S AUREUS DNA NOSE QL NAA+PROBE: DETECTED
SODIUM SERPL-SCNC: 129 MMOL/L — LOW (ref 135–145)
SODIUM SERPL-SCNC: 134 MMOL/L — LOW (ref 135–145)
VANCOMYCIN TROUGH SERPL-MCNC: 10.4 UG/ML — SIGNIFICANT CHANGE UP (ref 10–20)
WBC # BLD: 12.09 K/UL — HIGH (ref 3.8–10.5)
WBC # FLD AUTO: 12.09 K/UL — HIGH (ref 3.8–10.5)

## 2021-10-02 PROCEDURE — 99233 SBSQ HOSP IP/OBS HIGH 50: CPT | Mod: GC

## 2021-10-02 RX ORDER — VALSARTAN 80 MG/1
320 TABLET ORAL DAILY
Refills: 0 | Status: DISCONTINUED | OUTPATIENT
Start: 2021-10-02 | End: 2021-10-06

## 2021-10-02 RX ORDER — AMLODIPINE BESYLATE 2.5 MG/1
10 TABLET ORAL ONCE
Refills: 0 | Status: DISCONTINUED | OUTPATIENT
Start: 2021-10-02 | End: 2021-10-02

## 2021-10-02 RX ORDER — AMLODIPINE BESYLATE 2.5 MG/1
5 TABLET ORAL DAILY
Refills: 0 | Status: DISCONTINUED | OUTPATIENT
Start: 2021-10-02 | End: 2021-10-06

## 2021-10-02 RX ORDER — SODIUM,POTASSIUM PHOSPHATES 278-250MG
1 POWDER IN PACKET (EA) ORAL THREE TIMES A DAY
Refills: 0 | Status: DISCONTINUED | OUTPATIENT
Start: 2021-10-02 | End: 2021-10-02

## 2021-10-02 RX ORDER — INSULIN GLARGINE 100 [IU]/ML
43 INJECTION, SOLUTION SUBCUTANEOUS
Refills: 0 | Status: DISCONTINUED | OUTPATIENT
Start: 2021-10-02 | End: 2021-10-03

## 2021-10-02 RX ORDER — VANCOMYCIN HCL 1 G
1250 VIAL (EA) INTRAVENOUS EVERY 12 HOURS
Refills: 0 | Status: DISCONTINUED | OUTPATIENT
Start: 2021-10-02 | End: 2021-10-03

## 2021-10-02 RX ORDER — SODIUM,POTASSIUM PHOSPHATES 278-250MG
1 POWDER IN PACKET (EA) ORAL THREE TIMES A DAY
Refills: 0 | Status: COMPLETED | OUTPATIENT
Start: 2021-10-02 | End: 2021-10-03

## 2021-10-02 RX ADMIN — Medication 4: at 12:38

## 2021-10-02 RX ADMIN — PIPERACILLIN AND TAZOBACTAM 25 GRAM(S): 4; .5 INJECTION, POWDER, LYOPHILIZED, FOR SOLUTION INTRAVENOUS at 13:24

## 2021-10-02 RX ADMIN — Medication 3 MILLIGRAM(S): at 21:52

## 2021-10-02 RX ADMIN — Medication 4: at 09:08

## 2021-10-02 RX ADMIN — Medication 5 MILLIGRAM(S): at 22:25

## 2021-10-02 RX ADMIN — Medication 1 APPLICATION(S): at 11:34

## 2021-10-02 RX ADMIN — ENOXAPARIN SODIUM 40 MILLIGRAM(S): 100 INJECTION SUBCUTANEOUS at 12:40

## 2021-10-02 RX ADMIN — INSULIN GLARGINE 43 UNIT(S): 100 INJECTION, SOLUTION SUBCUTANEOUS at 22:02

## 2021-10-02 RX ADMIN — Medication 250 MILLIGRAM(S): at 04:20

## 2021-10-02 RX ADMIN — Medication 650 MILLIGRAM(S): at 22:24

## 2021-10-02 RX ADMIN — PIPERACILLIN AND TAZOBACTAM 25 GRAM(S): 4; .5 INJECTION, POWDER, LYOPHILIZED, FOR SOLUTION INTRAVENOUS at 05:56

## 2021-10-02 RX ADMIN — PIPERACILLIN AND TAZOBACTAM 25 GRAM(S): 4; .5 INJECTION, POWDER, LYOPHILIZED, FOR SOLUTION INTRAVENOUS at 21:51

## 2021-10-02 RX ADMIN — Medication 650 MILLIGRAM(S): at 21:54

## 2021-10-02 RX ADMIN — Medication 14 UNIT(S): at 12:38

## 2021-10-02 RX ADMIN — VALSARTAN 320 MILLIGRAM(S): 80 TABLET ORAL at 13:26

## 2021-10-02 RX ADMIN — GABAPENTIN 300 MILLIGRAM(S): 400 CAPSULE ORAL at 21:52

## 2021-10-02 RX ADMIN — Medication 50 MILLIGRAM(S): at 06:32

## 2021-10-02 RX ADMIN — AMLODIPINE BESYLATE 5 MILLIGRAM(S): 2.5 TABLET ORAL at 06:32

## 2021-10-02 RX ADMIN — Medication 1 PACKET(S): at 21:52

## 2021-10-02 RX ADMIN — INSULIN GLARGINE 45 UNIT(S): 100 INJECTION, SOLUTION SUBCUTANEOUS at 09:09

## 2021-10-02 RX ADMIN — Medication 14 UNIT(S): at 09:09

## 2021-10-02 RX ADMIN — Medication 166.67 MILLIGRAM(S): at 17:53

## 2021-10-02 RX ADMIN — Medication 14 UNIT(S): at 17:51

## 2021-10-02 RX ADMIN — AMLODIPINE BESYLATE 5 MILLIGRAM(S): 2.5 TABLET ORAL at 11:37

## 2021-10-02 RX ADMIN — Medication 1 PACKET(S): at 13:24

## 2021-10-02 RX ADMIN — Medication 2: at 22:02

## 2021-10-02 RX ADMIN — Medication 4: at 17:50

## 2021-10-02 NOTE — PROGRESS NOTE ADULT - PROBLEM SELECTOR PLAN 1
Infectious in the setting of poorly controlled DM2 s/p trauma --> open wound. Pt denies fevers, chills, or other systemic complaints. Likely a localized condition at this time with less concern for osteomyllitis as wound does not probe to the bone as per podiatrist Dr. Thao.     -IV vancomycin weight based dosing (9/30-)  -IV zosyn (10/1-)  -control hyperglycemia as listed below  - MRI not concerning for osteomylitis.   -f/u wound culture taken in outpatient podiatry setting  -

## 2021-10-02 NOTE — PROGRESS NOTE ADULT - ATTENDING COMMENTS
67 yo M w/ hx DM, HTN p/w foot ulcer. MRI w/o Osteo. febrile 10/1 -101.1 started on vanco/zosyn. BCx 10/1 in lab. bcx 9/30 NGTD. increased swelling in ankle and painful. check LE duplex. Vanco trough 10.5 increase to vanco 1250. MRSA swab- in lab. AM BMP 64. will decrease Lantus to 43 repeat  while eating frosted flakes this AM. check A1c. AG elevated check lactate. elevated BP suspect possibly due to pain. Tylenol PRN.

## 2021-10-02 NOTE — PROGRESS NOTE ADULT - SUBJECTIVE AND OBJECTIVE BOX
PROGRESS NOTE:   Authored By: Kvng Cardoso MD     PGY-1, Internal Medicine  Pager: [phone], [phone]    Patient is a 66y old  Male who presents with a chief complaint of Foot abscess and Uncontrolled DM2 (01 Oct 2021 13:30)      OVERNIGHT EVENTS: No acute events overnight    SUBJECTIVE: Pt seen and examined at bedside this morning.     ADDITIONAL REVIEW OF SYSTEMS:    MEDICATIONS  (STANDING):  amLODIPine   Tablet 10 milliGRAM(s) Oral once  collagenase Ointment 1 Application(s) Topical daily  dextrose 40% Gel 15 Gram(s) Oral once  dextrose 5%. 1000 milliLiter(s) (50 mL/Hr) IV Continuous <Continuous>  dextrose 5%. 1000 milliLiter(s) (100 mL/Hr) IV Continuous <Continuous>  dextrose 50% Injectable 25 Gram(s) IV Push once  dextrose 50% Injectable 12.5 Gram(s) IV Push once  dextrose 50% Injectable 25 Gram(s) IV Push once  enoxaparin Injectable 40 milliGRAM(s) SubCutaneous daily  gabapentin 300 milliGRAM(s) Oral at bedtime  glucagon  Injectable 1 milliGRAM(s) IntraMuscular once  glucagon  Injectable 1 milliGRAM(s) IntraMuscular once  influenza   Vaccine 0.5 milliLiter(s) IntraMuscular once  insulin glargine Injectable (LANTUS) 45 Unit(s) SubCutaneous two times a day  insulin lispro (ADMELOG) corrective regimen sliding scale   SubCutaneous three times a day before meals  insulin lispro (ADMELOG) corrective regimen sliding scale   SubCutaneous at bedtime  insulin lispro Injectable (ADMELOG) 14 Unit(s) SubCutaneous three times a day before meals  metoprolol succinate ER 50 milliGRAM(s) Oral daily  oxybutynin 5 milliGRAM(s) Oral at bedtime  piperacillin/tazobactam IVPB.. 3.375 Gram(s) IV Intermittent every 8 hours  vancomycin  IVPB 1250 milliGRAM(s) IV Intermittent every 12 hours    MEDICATIONS  (PRN):  acetaminophen   Tablet .. 650 milliGRAM(s) Oral every 6 hours PRN Temp greater or equal to 38C (100.4F), Mild Pain (1 - 3)  aluminum hydroxide/magnesium hydroxide/simethicone Suspension 30 milliLiter(s) Oral every 4 hours PRN Dyspepsia  melatonin 3 milliGRAM(s) Oral at bedtime PRN Insomnia  ondansetron Injectable 4 milliGRAM(s) IV Push every 8 hours PRN Nausea and/or Vomiting      CAPILLARY BLOOD GLUCOSE      POCT Blood Glucose.: 128 mg/dL (01 Oct 2021 22:54)  POCT Blood Glucose.: 167 mg/dL (01 Oct 2021 17:42)  POCT Blood Glucose.: 239 mg/dL (01 Oct 2021 12:14)  POCT Blood Glucose.: 236 mg/dL (01 Oct 2021 08:33)    I&O's Summary      PHYSICAL EXAM:  Vital Signs Last 24 Hrs  T(C): 37.1 (02 Oct 2021 06:33), Max: 38.4 (01 Oct 2021 13:50)  T(F): 98.7 (02 Oct 2021 06:33), Max: 101.1 (01 Oct 2021 13:50)  HR: 101 (02 Oct 2021 06:33) (90 - 101)  BP: 151/80 (02 Oct 2021 06:33) (148/90 - 168/93)  BP(mean): --  RR: 17 (02 Oct 2021 06:33) (17 - 18)  SpO2: 100% (02 Oct 2021 06:33) (98% - 100%)    CONSTITUTIONAL: NAD, well-developed  HEENT: NC/AT, EOMI  RESPIRATORY: No increased work of breathing, CTAB, no wheezes or crackles appreciated  CARDIOVASCULAR: RRR, S1 and S2 present, no m/r/g  ABDOMEN: soft, NT, ND, bowel sounds present  EXTREMITIES: No LE edema  MUSCULOSKELETAL: no joint swelling or tenderness to palpation  NEURO: A&Ox3, moving all extremities    LABS:                        11.3   12.52 )-----------( 249      ( 01 Oct 2021 07:51 )             33.2     10-01    130<L>  |  98  |  20  ----------------------------<  249<H>  3.8   |  21<L>  |  0.92    Ca    7.7<L>      01 Oct 2021 07:51  Phos  2.1     09-30  Mg     2.50     09-30    TPro  6.8  /  Alb  3.1<L>  /  TBili  0.4  /  DBili  x   /  AST  21  /  ALT  21  /  AlkPhos  91  10-01    PT/INR - ( 01 Oct 2021 07:51 )   PT: 13.1 sec;   INR: 1.16 ratio         PTT - ( 01 Oct 2021 07:51 )  PTT:25.0 sec          Culture - Blood (collected 30 Sep 2021 14:33)  Source: .Blood Blood-Peripheral  Preliminary Report (01 Oct 2021 15:01):    No growth to date.    Culture - Blood (collected 30 Sep 2021 14:33)  Source: .Blood Blood-Peripheral  Preliminary Report (01 Oct 2021 15:01):    No growth to date.        RADIOLOGY & ADDITIONAL TESTS:    Results Reviewed:   Imaging Personally Reviewed:  Electrocardiogram Personally Reviewed:    COORDINATION OF CARE:  Care Discussed with Consultants/Other Providers [Y/N]:  Prior or Outpatient Records Reviewed [Y/N]:   PROGRESS NOTE:   Authored By: Kvng Cardoso MD     PGY-1, Internal Medicine  Pager: [phone], [phone]    Patient is a 66y old  Male who presents with a chief complaint of Foot abscess and Uncontrolled DM2 (01 Oct 2021 13:30)      OVERNIGHT EVENTS: No acute events overnight    SUBJECTIVE: Pt seen and examined at bedside this morning. Says he feels worse and his ankle is more swollen and painful.     ADDITIONAL REVIEW OF SYSTEMS:    MEDICATIONS  (STANDING):  amLODIPine   Tablet 10 milliGRAM(s) Oral once  collagenase Ointment 1 Application(s) Topical daily  dextrose 40% Gel 15 Gram(s) Oral once  dextrose 5%. 1000 milliLiter(s) (50 mL/Hr) IV Continuous <Continuous>  dextrose 5%. 1000 milliLiter(s) (100 mL/Hr) IV Continuous <Continuous>  dextrose 50% Injectable 25 Gram(s) IV Push once  dextrose 50% Injectable 12.5 Gram(s) IV Push once  dextrose 50% Injectable 25 Gram(s) IV Push once  enoxaparin Injectable 40 milliGRAM(s) SubCutaneous daily  gabapentin 300 milliGRAM(s) Oral at bedtime  glucagon  Injectable 1 milliGRAM(s) IntraMuscular once  glucagon  Injectable 1 milliGRAM(s) IntraMuscular once  influenza   Vaccine 0.5 milliLiter(s) IntraMuscular once  insulin glargine Injectable (LANTUS) 45 Unit(s) SubCutaneous two times a day  insulin lispro (ADMELOG) corrective regimen sliding scale   SubCutaneous three times a day before meals  insulin lispro (ADMELOG) corrective regimen sliding scale   SubCutaneous at bedtime  insulin lispro Injectable (ADMELOG) 14 Unit(s) SubCutaneous three times a day before meals  metoprolol succinate ER 50 milliGRAM(s) Oral daily  oxybutynin 5 milliGRAM(s) Oral at bedtime  piperacillin/tazobactam IVPB.. 3.375 Gram(s) IV Intermittent every 8 hours  vancomycin  IVPB 1250 milliGRAM(s) IV Intermittent every 12 hours    MEDICATIONS  (PRN):  acetaminophen   Tablet .. 650 milliGRAM(s) Oral every 6 hours PRN Temp greater or equal to 38C (100.4F), Mild Pain (1 - 3)  aluminum hydroxide/magnesium hydroxide/simethicone Suspension 30 milliLiter(s) Oral every 4 hours PRN Dyspepsia  melatonin 3 milliGRAM(s) Oral at bedtime PRN Insomnia  ondansetron Injectable 4 milliGRAM(s) IV Push every 8 hours PRN Nausea and/or Vomiting      CAPILLARY BLOOD GLUCOSE      POCT Blood Glucose.: 128 mg/dL (01 Oct 2021 22:54)  POCT Blood Glucose.: 167 mg/dL (01 Oct 2021 17:42)  POCT Blood Glucose.: 239 mg/dL (01 Oct 2021 12:14)  POCT Blood Glucose.: 236 mg/dL (01 Oct 2021 08:33)    I&O's Summary      PHYSICAL EXAM:  Vital Signs Last 24 Hrs  T(C): 37.1 (02 Oct 2021 06:33), Max: 38.4 (01 Oct 2021 13:50)  T(F): 98.7 (02 Oct 2021 06:33), Max: 101.1 (01 Oct 2021 13:50)  HR: 101 (02 Oct 2021 06:33) (90 - 101)  BP: 151/80 (02 Oct 2021 06:33) (148/90 - 168/93)  BP(mean): --  RR: 17 (02 Oct 2021 06:33) (17 - 18)  SpO2: 100% (02 Oct 2021 06:33) (98% - 100%)    CONSTITUTIONAL: sitting in bed and uncomfortable (mildly)  HEENT: NC/AT, EOMI  RESPIRATORY: No increased work of breathing, CTAB, no wheezes or crackles appreciated  CARDIOVASCULAR: RRR, S1 and S2 present, no m/r/g  ABDOMEN: soft, NT, distended per body habitus, bowel sounds present  EXTREMITIES: No LE edema  MUSCULOSKELETAL: no joint swelling or tenderness to palpation  NEURO: A&Ox3, moving all extremities    LABS:                        11.3   12.52 )-----------( 249      ( 01 Oct 2021 07:51 )             33.2     10-01    130<L>  |  98  |  20  ----------------------------<  249<H>  3.8   |  21<L>  |  0.92    Ca    7.7<L>      01 Oct 2021 07:51  Phos  2.1     09-30  Mg     2.50     09-30    TPro  6.8  /  Alb  3.1<L>  /  TBili  0.4  /  DBili  x   /  AST  21  /  ALT  21  /  AlkPhos  91  10-01    PT/INR - ( 01 Oct 2021 07:51 )   PT: 13.1 sec;   INR: 1.16 ratio         PTT - ( 01 Oct 2021 07:51 )  PTT:25.0 sec          Culture - Blood (collected 30 Sep 2021 14:33)  Source: .Blood Blood-Peripheral  Preliminary Report (01 Oct 2021 15:01):    No growth to date.    Culture - Blood (collected 30 Sep 2021 14:33)  Source: .Blood Blood-Peripheral  Preliminary Report (01 Oct 2021 15:01):    No growth to date.        RADIOLOGY & ADDITIONAL TESTS:    Results Reviewed:   Imaging Personally Reviewed:  Electrocardiogram Personally Reviewed:    COORDINATION OF CARE:  Care Discussed with Consultants/Other Providers [Y/N]:  Prior or Outpatient Records Reviewed [Y/N]:   PROGRESS NOTE:   Authored By: Kvng Cardoso MD     PGY-1, Internal Medicine  Pager: [phone], [phone]    Patient is a 66y old  Male who presents with a chief complaint of Foot abscess and Uncontrolled DM2 (01 Oct 2021 13:30)      OVERNIGHT EVENTS: No acute events overnight    SUBJECTIVE: Pt seen and examined at bedside this morning. Says he feels worse and his ankle is more swollen and painful.     ADDITIONAL REVIEW OF SYSTEMS:    MEDICATIONS  (STANDING):  amLODIPine   Tablet 10 milliGRAM(s) Oral once  collagenase Ointment 1 Application(s) Topical daily  dextrose 40% Gel 15 Gram(s) Oral once  dextrose 5%. 1000 milliLiter(s) (50 mL/Hr) IV Continuous <Continuous>  dextrose 5%. 1000 milliLiter(s) (100 mL/Hr) IV Continuous <Continuous>  dextrose 50% Injectable 25 Gram(s) IV Push once  dextrose 50% Injectable 12.5 Gram(s) IV Push once  dextrose 50% Injectable 25 Gram(s) IV Push once  enoxaparin Injectable 40 milliGRAM(s) SubCutaneous daily  gabapentin 300 milliGRAM(s) Oral at bedtime  glucagon  Injectable 1 milliGRAM(s) IntraMuscular once  glucagon  Injectable 1 milliGRAM(s) IntraMuscular once  influenza   Vaccine 0.5 milliLiter(s) IntraMuscular once  insulin glargine Injectable (LANTUS) 45 Unit(s) SubCutaneous two times a day  insulin lispro (ADMELOG) corrective regimen sliding scale   SubCutaneous three times a day before meals  insulin lispro (ADMELOG) corrective regimen sliding scale   SubCutaneous at bedtime  insulin lispro Injectable (ADMELOG) 14 Unit(s) SubCutaneous three times a day before meals  metoprolol succinate ER 50 milliGRAM(s) Oral daily  oxybutynin 5 milliGRAM(s) Oral at bedtime  piperacillin/tazobactam IVPB.. 3.375 Gram(s) IV Intermittent every 8 hours  vancomycin  IVPB 1250 milliGRAM(s) IV Intermittent every 12 hours    MEDICATIONS  (PRN):  acetaminophen   Tablet .. 650 milliGRAM(s) Oral every 6 hours PRN Temp greater or equal to 38C (100.4F), Mild Pain (1 - 3)  aluminum hydroxide/magnesium hydroxide/simethicone Suspension 30 milliLiter(s) Oral every 4 hours PRN Dyspepsia  melatonin 3 milliGRAM(s) Oral at bedtime PRN Insomnia  ondansetron Injectable 4 milliGRAM(s) IV Push every 8 hours PRN Nausea and/or Vomiting      CAPILLARY BLOOD GLUCOSE      POCT Blood Glucose.: 128 mg/dL (01 Oct 2021 22:54)  POCT Blood Glucose.: 167 mg/dL (01 Oct 2021 17:42)  POCT Blood Glucose.: 239 mg/dL (01 Oct 2021 12:14)  POCT Blood Glucose.: 236 mg/dL (01 Oct 2021 08:33)    I&O's Summary      PHYSICAL EXAM:  Vital Signs Last 24 Hrs  T(C): 37.1 (02 Oct 2021 06:33), Max: 38.4 (01 Oct 2021 13:50)  T(F): 98.7 (02 Oct 2021 06:33), Max: 101.1 (01 Oct 2021 13:50)  HR: 101 (02 Oct 2021 06:33) (90 - 101)  BP: 151/80 (02 Oct 2021 06:33) (148/90 - 168/93)  BP(mean): --  RR: 17 (02 Oct 2021 06:33) (17 - 18)  SpO2: 100% (02 Oct 2021 06:33) (98% - 100%)    CONSTITUTIONAL: sitting in bed and uncomfortable (mildly)  HEENT: NC/AT, EOMI  RESPIRATORY: No increased work of breathing, CTAB, no wheezes or crackles appreciated  CARDIOVASCULAR: RRR, S1 and S2 present, no m/r/g  ABDOMEN: soft, NT, distended per body habitus, bowel sounds present  EXTREMITIES: left leg swollen above the ankle now worse than prior day  MUSCULOSKELETAL: no joint swelling or tenderness to palpation  NEURO: A&Ox3, moving all extremities    LABS:                        11.3   12.52 )-----------( 249      ( 01 Oct 2021 07:51 )             33.2     10-01    130<L>  |  98  |  20  ----------------------------<  249<H>  3.8   |  21<L>  |  0.92    Ca    7.7<L>      01 Oct 2021 07:51  Phos  2.1     09-30  Mg     2.50     09-30    TPro  6.8  /  Alb  3.1<L>  /  TBili  0.4  /  DBili  x   /  AST  21  /  ALT  21  /  AlkPhos  91  10-01    PT/INR - ( 01 Oct 2021 07:51 )   PT: 13.1 sec;   INR: 1.16 ratio         PTT - ( 01 Oct 2021 07:51 )  PTT:25.0 sec          Culture - Blood (collected 30 Sep 2021 14:33)  Source: .Blood Blood-Peripheral  Preliminary Report (01 Oct 2021 15:01):    No growth to date.    Culture - Blood (collected 30 Sep 2021 14:33)  Source: .Blood Blood-Peripheral  Preliminary Report (01 Oct 2021 15:01):    No growth to date.        RADIOLOGY & ADDITIONAL TESTS:    Results Reviewed:   Imaging Personally Reviewed:  Electrocardiogram Personally Reviewed:    COORDINATION OF CARE:  Care Discussed with Consultants/Other Providers [Y/N]:  Prior or Outpatient Records Reviewed [Y/N]:

## 2021-10-02 NOTE — PROGRESS NOTE ADULT - SUBJECTIVE AND OBJECTIVE BOX
Patient is a 66y old  Male who presents with a chief complaint of Foot abscess and Uncontrolled DM2 (02 Oct 2021 07:44)       INTERVAL HPI/OVERNIGHT EVENTS:  Patient seen and evaluated at bedside.  Pt is resting comfortable in NAD. Denies N/V/F/C.    Allergies    No Known Allergies    Intolerances        Vital Signs Last 24 Hrs  T(C): 37.1 (02 Oct 2021 06:33), Max: 38.4 (01 Oct 2021 13:50)  T(F): 98.7 (02 Oct 2021 06:33), Max: 101.1 (01 Oct 2021 13:50)  HR: 101 (02 Oct 2021 06:33) (90 - 101)  BP: 151/80 (02 Oct 2021 06:33) (148/90 - 168/93)  BP(mean): --  RR: 17 (02 Oct 2021 06:33) (17 - 18)  SpO2: 100% (02 Oct 2021 06:33) (98% - 100%)    LABS:                        10.9   12.09 )-----------( 272      ( 02 Oct 2021 08:17 )             32.4     10-02    134<L>  |  97<L>  |  18  ----------------------------<  67<L>  3.9   |  19<L>  |  0.89    Ca    8.1<L>      02 Oct 2021 08:17  Phos  2.2     10-02  Mg     2.20     10-02    TPro  7.0  /  Alb  3.1<L>  /  TBili  0.4  /  DBili  x   /  AST  42<H>  /  ALT  30  /  AlkPhos  129<H>  10-02    PT/INR - ( 01 Oct 2021 07:51 )   PT: 13.1 sec;   INR: 1.16 ratio         PTT - ( 01 Oct 2021 07:51 )  PTT:25.0 sec    CAPILLARY BLOOD GLUCOSE      POCT Blood Glucose.: 230 mg/dL (02 Oct 2021 08:50)  POCT Blood Glucose.: 128 mg/dL (01 Oct 2021 22:54)  POCT Blood Glucose.: 167 mg/dL (01 Oct 2021 17:42)  POCT Blood Glucose.: 239 mg/dL (01 Oct 2021 12:14)      Lower Extremity Physical Exam:  Vascular: DP/PT 2/4, B/L, CFT <3 seconds B/L, Temperature gradient warm to warm B/L.   Neuro: Epicritic sensation intact to the level of digits, B/L.  Musculoskeletal/Ortho: unremarkable  Skin: left foot plantar hallux eschar with periwound healthy dermis, no drainage, no purulence, no malodor, edema noted to L ankle and lower leg with redness and warmth      RADIOLOGY & ADDITIONAL TESTS:

## 2021-10-03 LAB
ALBUMIN SERPL ELPH-MCNC: 3.4 G/DL — SIGNIFICANT CHANGE UP (ref 3.3–5)
ALP SERPL-CCNC: 155 U/L — HIGH (ref 40–120)
ALT FLD-CCNC: 42 U/L — HIGH (ref 4–41)
ANION GAP SERPL CALC-SCNC: 14 MMOL/L — SIGNIFICANT CHANGE UP (ref 7–14)
AST SERPL-CCNC: 47 U/L — HIGH (ref 4–40)
BILIRUB SERPL-MCNC: 0.5 MG/DL — SIGNIFICANT CHANGE UP (ref 0.2–1.2)
BUN SERPL-MCNC: 13 MG/DL — SIGNIFICANT CHANGE UP (ref 7–23)
CALCIUM SERPL-MCNC: 8.9 MG/DL — SIGNIFICANT CHANGE UP (ref 8.4–10.5)
CHLORIDE SERPL-SCNC: 94 MMOL/L — LOW (ref 98–107)
CO2 SERPL-SCNC: 22 MMOL/L — SIGNIFICANT CHANGE UP (ref 22–31)
CREAT SERPL-MCNC: 0.88 MG/DL — SIGNIFICANT CHANGE UP (ref 0.5–1.3)
GLUCOSE SERPL-MCNC: 216 MG/DL — HIGH (ref 70–99)
HCT VFR BLD CALC: 36.4 % — LOW (ref 39–50)
HGB BLD-MCNC: 12.5 G/DL — LOW (ref 13–17)
MAGNESIUM SERPL-MCNC: 2.4 MG/DL — SIGNIFICANT CHANGE UP (ref 1.6–2.6)
MCHC RBC-ENTMCNC: 27.3 PG — SIGNIFICANT CHANGE UP (ref 27–34)
MCHC RBC-ENTMCNC: 34.3 GM/DL — SIGNIFICANT CHANGE UP (ref 32–36)
MCV RBC AUTO: 79.5 FL — LOW (ref 80–100)
NRBC # BLD: 0 /100 WBCS — SIGNIFICANT CHANGE UP
NRBC # FLD: 0 K/UL — SIGNIFICANT CHANGE UP
PHOSPHATE SERPL-MCNC: 2.3 MG/DL — LOW (ref 2.5–4.5)
PLATELET # BLD AUTO: 338 K/UL — SIGNIFICANT CHANGE UP (ref 150–400)
POTASSIUM SERPL-MCNC: 4.5 MMOL/L — SIGNIFICANT CHANGE UP (ref 3.5–5.3)
POTASSIUM SERPL-SCNC: 4.5 MMOL/L — SIGNIFICANT CHANGE UP (ref 3.5–5.3)
PROT SERPL-MCNC: 8.3 G/DL — SIGNIFICANT CHANGE UP (ref 6–8.3)
RBC # BLD: 4.58 M/UL — SIGNIFICANT CHANGE UP (ref 4.2–5.8)
RBC # FLD: 13 % — SIGNIFICANT CHANGE UP (ref 10.3–14.5)
SODIUM SERPL-SCNC: 130 MMOL/L — LOW (ref 135–145)
WBC # BLD: 13.94 K/UL — HIGH (ref 3.8–10.5)
WBC # FLD AUTO: 13.94 K/UL — HIGH (ref 3.8–10.5)

## 2021-10-03 PROCEDURE — 99233 SBSQ HOSP IP/OBS HIGH 50: CPT

## 2021-10-03 RX ORDER — OXYCODONE HYDROCHLORIDE 5 MG/1
2.5 TABLET ORAL EVERY 6 HOURS
Refills: 0 | Status: DISCONTINUED | OUTPATIENT
Start: 2021-10-03 | End: 2021-10-06

## 2021-10-03 RX ORDER — TRAMADOL HYDROCHLORIDE 50 MG/1
25 TABLET ORAL EVERY 6 HOURS
Refills: 0 | Status: DISCONTINUED | OUTPATIENT
Start: 2021-10-03 | End: 2021-10-06

## 2021-10-03 RX ORDER — INSULIN GLARGINE 100 [IU]/ML
45 INJECTION, SOLUTION SUBCUTANEOUS
Refills: 0 | Status: DISCONTINUED | OUTPATIENT
Start: 2021-10-03 | End: 2021-10-06

## 2021-10-03 RX ORDER — INSULIN LISPRO 100/ML
17 VIAL (ML) SUBCUTANEOUS
Refills: 0 | Status: DISCONTINUED | OUTPATIENT
Start: 2021-10-03 | End: 2021-10-06

## 2021-10-03 RX ADMIN — OXYCODONE HYDROCHLORIDE 2.5 MILLIGRAM(S): 5 TABLET ORAL at 19:07

## 2021-10-03 RX ADMIN — Medication 14 UNIT(S): at 13:06

## 2021-10-03 RX ADMIN — Medication 85 MILLIMOLE(S): at 14:57

## 2021-10-03 RX ADMIN — GABAPENTIN 300 MILLIGRAM(S): 400 CAPSULE ORAL at 22:17

## 2021-10-03 RX ADMIN — ENOXAPARIN SODIUM 40 MILLIGRAM(S): 100 INJECTION SUBCUTANEOUS at 13:06

## 2021-10-03 RX ADMIN — Medication 17 UNIT(S): at 17:52

## 2021-10-03 RX ADMIN — PIPERACILLIN AND TAZOBACTAM 25 GRAM(S): 4; .5 INJECTION, POWDER, LYOPHILIZED, FOR SOLUTION INTRAVENOUS at 13:04

## 2021-10-03 RX ADMIN — INSULIN GLARGINE 45 UNIT(S): 100 INJECTION, SOLUTION SUBCUTANEOUS at 22:17

## 2021-10-03 RX ADMIN — Medication 1 PACKET(S): at 06:24

## 2021-10-03 RX ADMIN — PIPERACILLIN AND TAZOBACTAM 25 GRAM(S): 4; .5 INJECTION, POWDER, LYOPHILIZED, FOR SOLUTION INTRAVENOUS at 06:24

## 2021-10-03 RX ADMIN — Medication 4: at 17:51

## 2021-10-03 RX ADMIN — VALSARTAN 320 MILLIGRAM(S): 80 TABLET ORAL at 06:23

## 2021-10-03 RX ADMIN — Medication 1 APPLICATION(S): at 13:06

## 2021-10-03 RX ADMIN — Medication 50 MILLIGRAM(S): at 06:24

## 2021-10-03 RX ADMIN — OXYCODONE HYDROCHLORIDE 2.5 MILLIGRAM(S): 5 TABLET ORAL at 18:07

## 2021-10-03 RX ADMIN — Medication 6: at 08:51

## 2021-10-03 RX ADMIN — PIPERACILLIN AND TAZOBACTAM 25 GRAM(S): 4; .5 INJECTION, POWDER, LYOPHILIZED, FOR SOLUTION INTRAVENOUS at 22:18

## 2021-10-03 RX ADMIN — Medication 14 UNIT(S): at 08:51

## 2021-10-03 RX ADMIN — AMLODIPINE BESYLATE 5 MILLIGRAM(S): 2.5 TABLET ORAL at 06:24

## 2021-10-03 RX ADMIN — Medication 6: at 13:05

## 2021-10-03 RX ADMIN — INSULIN GLARGINE 43 UNIT(S): 100 INJECTION, SOLUTION SUBCUTANEOUS at 08:51

## 2021-10-03 RX ADMIN — Medication 166.67 MILLIGRAM(S): at 06:23

## 2021-10-03 RX ADMIN — Medication 5 MILLIGRAM(S): at 22:17

## 2021-10-03 NOTE — PROGRESS NOTE ADULT - ASSESSMENT
65 y/o male with sig med hx of DMII, with past admission in 2019 for DKA, presents to the ED with 3 days of worsening foot abscess and possible soft tissue infection s/p falling off a few steps on a ladder and stubbing his toe. In the setting of uncontrolled diabetes type due to medication non-compliance, trauma leading to a foot abscess is a leading dx, less concerning for osteomyletis. Plan is to control the pt's hyperglycemia, start IV vancomycin for MRSA coverage, and f/u MRI to rule out osteomyelitis.

## 2021-10-03 NOTE — PROGRESS NOTE ADULT - ATTENDING COMMENTS
65 yo M w/ hx DM, HTN p/w foot ulcer. MRI w/o Osteo. febrile 10/1 -101.1 started on vanco/zosyn. BCx 10/2 bcx 9/30 NGTD. increased swelling in ankle and painful. check LE duplex. discontinue vanco. MRSA swab- neg. cont zosyn. no further hypoglycemia increase lantus to 45 BID and admelog  17U  check A1c 12.2. elevated BP suspect possibly due to pain 130-150. Tylenol PRN. oxy IR 2.5. 65 yo M w/ hx DM, HTN p/w foot ulcer. MRI w/o Osteo. febrile 10/1 -101.1 started on vanco/zosyn. BCx 10/2 bcx 9/30 NGTD. increased swelling in ankle and painful. check LE duplex. discontinue vanco. MRSA swab- neg. cont zosyn. suspect BMP BS 67 labs error. no further hypoglycemia increase lantus to 45 BID and admelog  17U  check A1c 12.2. elevated BP suspect possibly due to pain 130-150. Tylenol PRN. oxy IR 2.5.

## 2021-10-03 NOTE — PROGRESS NOTE ADULT - SUBJECTIVE AND OBJECTIVE BOX
Adelina Lr MD  Internal Medicine PGY-3  Pager [phone]  Pager [phone]      Patient is a 66y old  Male who presents with a chief complaint of Foot abscess and Uncontrolled DM2 (02 Oct 2021 10:59)        SUBJECTIVE / OVERNIGHT EVENTS: Patient had no acute events overnight. Patient seen and examined at bedside this morning.     ROS: [ - ] Fever [ - ] Chills [ - ] Nausea/Vomiting [ - ] Chest Pain [ - ] Shortness of breath     MEDICATIONS  (STANDING):  amLODIPine   Tablet 5 milliGRAM(s) Oral daily  collagenase Ointment 1 Application(s) Topical daily  dextrose 40% Gel 15 Gram(s) Oral once  dextrose 5%. 1000 milliLiter(s) (50 mL/Hr) IV Continuous <Continuous>  dextrose 5%. 1000 milliLiter(s) (100 mL/Hr) IV Continuous <Continuous>  dextrose 50% Injectable 25 Gram(s) IV Push once  dextrose 50% Injectable 12.5 Gram(s) IV Push once  dextrose 50% Injectable 25 Gram(s) IV Push once  enoxaparin Injectable 40 milliGRAM(s) SubCutaneous daily  gabapentin 300 milliGRAM(s) Oral at bedtime  glucagon  Injectable 1 milliGRAM(s) IntraMuscular once  glucagon  Injectable 1 milliGRAM(s) IntraMuscular once  influenza   Vaccine 0.5 milliLiter(s) IntraMuscular once  insulin glargine Injectable (LANTUS) 43 Unit(s) SubCutaneous two times a day  insulin lispro (ADMELOG) corrective regimen sliding scale   SubCutaneous three times a day before meals  insulin lispro (ADMELOG) corrective regimen sliding scale   SubCutaneous at bedtime  insulin lispro Injectable (ADMELOG) 14 Unit(s) SubCutaneous three times a day before meals  metoprolol succinate ER 50 milliGRAM(s) Oral daily  oxybutynin 5 milliGRAM(s) Oral at bedtime  piperacillin/tazobactam IVPB.. 3.375 Gram(s) IV Intermittent every 8 hours  valsartan 320 milliGRAM(s) Oral daily    MEDICATIONS  (PRN):  acetaminophen   Tablet .. 650 milliGRAM(s) Oral every 6 hours PRN Temp greater or equal to 38C (100.4F), Mild Pain (1 - 3)  aluminum hydroxide/magnesium hydroxide/simethicone Suspension 30 milliLiter(s) Oral every 4 hours PRN Dyspepsia  melatonin 3 milliGRAM(s) Oral at bedtime PRN Insomnia  ondansetron Injectable 4 milliGRAM(s) IV Push every 8 hours PRN Nausea and/or Vomiting      Vital Signs Last 24 Hrs  T(C): 36.8 (03 Oct 2021 06:20), Max: 37.2 (02 Oct 2021 21:41)  T(F): 98.2 (03 Oct 2021 06:20), Max: 99 (02 Oct 2021 21:41)  HR: 89 (03 Oct 2021 06:20) (89 - 96)  BP: 137/60 (03 Oct 2021 06:20) (137/60 - 151/74)  BP(mean): --  RR: 17 (03 Oct 2021 06:20) (16 - 17)  SpO2: 99% (03 Oct 2021 06:20) (97% - 99%)  CAPILLARY BLOOD GLUCOSE      POCT Blood Glucose.: 256 mg/dL (02 Oct 2021 21:57)  POCT Blood Glucose.: 240 mg/dL (02 Oct 2021 17:48)  POCT Blood Glucose.: 238 mg/dL (02 Oct 2021 12:27)  POCT Blood Glucose.: 230 mg/dL (02 Oct 2021 08:50)    I&O's Summary      PHYSICAL EXAM  GENERAL: NAD, lying comfortably in bed   HEENT:  Atraumatic, Normocephalic, EOMI, conjunctiva and sclera clear, no LAD  CHEST/LUNG: Clear to auscultation bilaterally; No wheeze  HEART: RRR, S1 and S2 No murmurs, rubs, or gallops  ABDOMEN: Soft, Nontender, Nondistended; Bowel sounds present  EXTREMITIES:  2+ Peripheral Pulses, No clubbing, cyanosis, or edema  NEURO: AAOx3, non-focal  SKIN: No rashes or lesions    LABS:                        10.9   12.09 )-----------( 272      ( 02 Oct 2021 08:17 )             32.4     10-02    129<L>  |  96<L>  |  15  ----------------------------<  275<H>  4.5   |  21<L>  |  0.83    Ca    8.2<L>      02 Oct 2021 16:01  Phos  2.2     10-02  Mg     2.20     10-02    TPro  7.3  /  Alb  3.0<L>  /  TBili  0.3  /  DBili  x   /  AST  42<H>  /  ALT  34  /  AlkPhos  128<H>  10-02    PT/INR - ( 01 Oct 2021 07:51 )   PT: 13.1 sec;   INR: 1.16 ratio         PTT - ( 01 Oct 2021 07:51 )  PTT:25.0 sec            RADIOLOGY & ADDITIONAL TESTS:    Imaging Personally Reviewed:  Consultant(s) Notes Reviewed:     Adelina Lr MD  Internal Medicine PGY-3  Pager [phone]  Pager [phone]      Patient is a 66y old  Male who presents with a chief complaint of Foot abscess and Uncontrolled DM2 (02 Oct 2021 10:59)        SUBJECTIVE / OVERNIGHT EVENTS: Patient had no acute events overnight. Patient seen and examined at bedside this morning. Endorses heel swelling and pain unchanged from admission     ROS: [ - ] Fever [ - ] Chills [ - ] Nausea/Vomiting [ - ] Chest Pain [ - ] Shortness of breath     MEDICATIONS  (STANDING):  amLODIPine   Tablet 5 milliGRAM(s) Oral daily  collagenase Ointment 1 Application(s) Topical daily  dextrose 40% Gel 15 Gram(s) Oral once  dextrose 5%. 1000 milliLiter(s) (50 mL/Hr) IV Continuous <Continuous>  dextrose 5%. 1000 milliLiter(s) (100 mL/Hr) IV Continuous <Continuous>  dextrose 50% Injectable 25 Gram(s) IV Push once  dextrose 50% Injectable 12.5 Gram(s) IV Push once  dextrose 50% Injectable 25 Gram(s) IV Push once  enoxaparin Injectable 40 milliGRAM(s) SubCutaneous daily  gabapentin 300 milliGRAM(s) Oral at bedtime  glucagon  Injectable 1 milliGRAM(s) IntraMuscular once  glucagon  Injectable 1 milliGRAM(s) IntraMuscular once  influenza   Vaccine 0.5 milliLiter(s) IntraMuscular once  insulin glargine Injectable (LANTUS) 43 Unit(s) SubCutaneous two times a day  insulin lispro (ADMELOG) corrective regimen sliding scale   SubCutaneous three times a day before meals  insulin lispro (ADMELOG) corrective regimen sliding scale   SubCutaneous at bedtime  insulin lispro Injectable (ADMELOG) 14 Unit(s) SubCutaneous three times a day before meals  metoprolol succinate ER 50 milliGRAM(s) Oral daily  oxybutynin 5 milliGRAM(s) Oral at bedtime  piperacillin/tazobactam IVPB.. 3.375 Gram(s) IV Intermittent every 8 hours  valsartan 320 milliGRAM(s) Oral daily    MEDICATIONS  (PRN):  acetaminophen   Tablet .. 650 milliGRAM(s) Oral every 6 hours PRN Temp greater or equal to 38C (100.4F), Mild Pain (1 - 3)  aluminum hydroxide/magnesium hydroxide/simethicone Suspension 30 milliLiter(s) Oral every 4 hours PRN Dyspepsia  melatonin 3 milliGRAM(s) Oral at bedtime PRN Insomnia  ondansetron Injectable 4 milliGRAM(s) IV Push every 8 hours PRN Nausea and/or Vomiting      Vital Signs Last 24 Hrs  T(C): 36.8 (03 Oct 2021 06:20), Max: 37.2 (02 Oct 2021 21:41)  T(F): 98.2 (03 Oct 2021 06:20), Max: 99 (02 Oct 2021 21:41)  HR: 89 (03 Oct 2021 06:20) (89 - 96)  BP: 137/60 (03 Oct 2021 06:20) (137/60 - 151/74)  BP(mean): --  RR: 17 (03 Oct 2021 06:20) (16 - 17)  SpO2: 99% (03 Oct 2021 06:20) (97% - 99%)  CAPILLARY BLOOD GLUCOSE      POCT Blood Glucose.: 256 mg/dL (02 Oct 2021 21:57)  POCT Blood Glucose.: 240 mg/dL (02 Oct 2021 17:48)  POCT Blood Glucose.: 238 mg/dL (02 Oct 2021 12:27)  POCT Blood Glucose.: 230 mg/dL (02 Oct 2021 08:50)    I&O's Summary      PHYSICAL EXAM  GENERAL: NAD, lying comfortably in bed   HEENT:  Atraumatic, Normocephalic, conjunctiva and sclera clear  CHEST/LUNG: Clear to auscultation bilaterally; No wheeze  HEART: RRR, S1 and S2 No murmurs, rubs, or gallops  ABDOMEN: Soft, Nontender, Nondistended; Bowel sounds present  EXTREMITIES:  left leg swollen above the ankle with erythema  NEURO: AAOx3, non-focal  SKIN: No additional rashes or lesions    LABS:                        10.9   12.09 )-----------( 272      ( 02 Oct 2021 08:17 )             32.4     10-02    129<L>  |  96<L>  |  15  ----------------------------<  275<H>  4.5   |  21<L>  |  0.83    Ca    8.2<L>      02 Oct 2021 16:01  Phos  2.2     10-02  Mg     2.20     10-02    TPro  7.3  /  Alb  3.0<L>  /  TBili  0.3  /  DBili  x   /  AST  42<H>  /  ALT  34  /  AlkPhos  128<H>  10-02    PT/INR - ( 01 Oct 2021 07:51 )   PT: 13.1 sec;   INR: 1.16 ratio         PTT - ( 01 Oct 2021 07:51 )  PTT:25.0 sec            RADIOLOGY & ADDITIONAL TESTS:    Imaging Personally Reviewed:  Consultant(s) Notes Reviewed:

## 2021-10-03 NOTE — PROGRESS NOTE ADULT - PROBLEM SELECTOR PLAN 4
-cont at home lipitor 40 QD    DVT: Lovenox  Diet: Consisent carb  Dispo: pending dvt study prior dc with home PT

## 2021-10-04 LAB
ANION GAP SERPL CALC-SCNC: 14 MMOL/L — SIGNIFICANT CHANGE UP (ref 7–14)
BUN SERPL-MCNC: 9 MG/DL — SIGNIFICANT CHANGE UP (ref 7–23)
CALCIUM SERPL-MCNC: 9 MG/DL — SIGNIFICANT CHANGE UP (ref 8.4–10.5)
CHLORIDE SERPL-SCNC: 96 MMOL/L — LOW (ref 98–107)
CO2 SERPL-SCNC: 23 MMOL/L — SIGNIFICANT CHANGE UP (ref 22–31)
CREAT SERPL-MCNC: 1.02 MG/DL — SIGNIFICANT CHANGE UP (ref 0.5–1.3)
GLUCOSE BLDC GLUCOMTR-MCNC: 109 MG/DL — HIGH (ref 70–99)
GLUCOSE BLDC GLUCOMTR-MCNC: 174 MG/DL — HIGH (ref 70–99)
GLUCOSE BLDC GLUCOMTR-MCNC: 188 MG/DL — HIGH (ref 70–99)
GLUCOSE SERPL-MCNC: 115 MG/DL — HIGH (ref 70–99)
HCT VFR BLD CALC: 35.2 % — LOW (ref 39–50)
HGB BLD-MCNC: 11.9 G/DL — LOW (ref 13–17)
MAGNESIUM SERPL-MCNC: 2.5 MG/DL — SIGNIFICANT CHANGE UP (ref 1.6–2.6)
MCHC RBC-ENTMCNC: 26.3 PG — LOW (ref 27–34)
MCHC RBC-ENTMCNC: 33.8 GM/DL — SIGNIFICANT CHANGE UP (ref 32–36)
MCV RBC AUTO: 77.9 FL — LOW (ref 80–100)
NRBC # BLD: 0 /100 WBCS — SIGNIFICANT CHANGE UP
NRBC # FLD: 0 K/UL — SIGNIFICANT CHANGE UP
PHOSPHATE SERPL-MCNC: 3.6 MG/DL — SIGNIFICANT CHANGE UP (ref 2.5–4.5)
PLATELET # BLD AUTO: 359 K/UL — SIGNIFICANT CHANGE UP (ref 150–400)
POTASSIUM SERPL-MCNC: 4.4 MMOL/L — SIGNIFICANT CHANGE UP (ref 3.5–5.3)
POTASSIUM SERPL-SCNC: 4.4 MMOL/L — SIGNIFICANT CHANGE UP (ref 3.5–5.3)
RBC # BLD: 4.52 M/UL — SIGNIFICANT CHANGE UP (ref 4.2–5.8)
RBC # FLD: 13 % — SIGNIFICANT CHANGE UP (ref 10.3–14.5)
SODIUM SERPL-SCNC: 133 MMOL/L — LOW (ref 135–145)
WBC # BLD: 12.86 K/UL — HIGH (ref 3.8–10.5)
WBC # FLD AUTO: 12.86 K/UL — HIGH (ref 3.8–10.5)

## 2021-10-04 PROCEDURE — 99232 SBSQ HOSP IP/OBS MODERATE 35: CPT | Mod: GC

## 2021-10-04 RX ADMIN — TRAMADOL HYDROCHLORIDE 25 MILLIGRAM(S): 50 TABLET ORAL at 13:53

## 2021-10-04 RX ADMIN — INSULIN GLARGINE 45 UNIT(S): 100 INJECTION, SOLUTION SUBCUTANEOUS at 21:57

## 2021-10-04 RX ADMIN — PIPERACILLIN AND TAZOBACTAM 25 GRAM(S): 4; .5 INJECTION, POWDER, LYOPHILIZED, FOR SOLUTION INTRAVENOUS at 13:51

## 2021-10-04 RX ADMIN — OXYCODONE HYDROCHLORIDE 2.5 MILLIGRAM(S): 5 TABLET ORAL at 07:00

## 2021-10-04 RX ADMIN — Medication 17 UNIT(S): at 12:34

## 2021-10-04 RX ADMIN — TRAMADOL HYDROCHLORIDE 25 MILLIGRAM(S): 50 TABLET ORAL at 23:00

## 2021-10-04 RX ADMIN — Medication 2: at 08:43

## 2021-10-04 RX ADMIN — Medication 17 UNIT(S): at 17:32

## 2021-10-04 RX ADMIN — Medication 1 APPLICATION(S): at 12:33

## 2021-10-04 RX ADMIN — TRAMADOL HYDROCHLORIDE 25 MILLIGRAM(S): 50 TABLET ORAL at 22:05

## 2021-10-04 RX ADMIN — PIPERACILLIN AND TAZOBACTAM 25 GRAM(S): 4; .5 INJECTION, POWDER, LYOPHILIZED, FOR SOLUTION INTRAVENOUS at 06:37

## 2021-10-04 RX ADMIN — AMLODIPINE BESYLATE 5 MILLIGRAM(S): 2.5 TABLET ORAL at 06:37

## 2021-10-04 RX ADMIN — Medication 50 MILLIGRAM(S): at 06:37

## 2021-10-04 RX ADMIN — INSULIN GLARGINE 45 UNIT(S): 100 INJECTION, SOLUTION SUBCUTANEOUS at 08:45

## 2021-10-04 RX ADMIN — Medication 2: at 17:31

## 2021-10-04 RX ADMIN — Medication 2: at 12:34

## 2021-10-04 RX ADMIN — Medication 17 UNIT(S): at 08:44

## 2021-10-04 RX ADMIN — ENOXAPARIN SODIUM 40 MILLIGRAM(S): 100 INJECTION SUBCUTANEOUS at 12:33

## 2021-10-04 RX ADMIN — Medication 5 MILLIGRAM(S): at 21:58

## 2021-10-04 RX ADMIN — GABAPENTIN 300 MILLIGRAM(S): 400 CAPSULE ORAL at 21:58

## 2021-10-04 RX ADMIN — VALSARTAN 320 MILLIGRAM(S): 80 TABLET ORAL at 06:37

## 2021-10-04 RX ADMIN — PIPERACILLIN AND TAZOBACTAM 25 GRAM(S): 4; .5 INJECTION, POWDER, LYOPHILIZED, FOR SOLUTION INTRAVENOUS at 21:58

## 2021-10-04 RX ADMIN — TRAMADOL HYDROCHLORIDE 25 MILLIGRAM(S): 50 TABLET ORAL at 14:53

## 2021-10-04 NOTE — PROGRESS NOTE ADULT - SUBJECTIVE AND OBJECTIVE BOX
Podiatry pager #: [phone] ([place])/ [phone] ([facility])    Patient is a 66y old  Male who presents with a chief complaint of Foot abscess and Uncontrolled DM2 (04 Oct 2021 07:34)       INTERVAL HPI/OVERNIGHT EVENTS:  Patient seen and evaluated at bedside.  Pt is resting comfortable in NAD. Denies N/V/F/C.     Allergies    No Known Allergies    Intolerances        Vital Signs Last 24 Hrs  T(C): 36.6 (04 Oct 2021 05:28), Max: 36.9 (03 Oct 2021 22:55)  T(F): 97.9 (04 Oct 2021 05:28), Max: 98.5 (03 Oct 2021 22:55)  HR: 85 (04 Oct 2021 05:28) (85 - 98)  BP: 142/63 (04 Oct 2021 05:28) (142/63 - 166/92)  BP(mean): --  RR: 18 (04 Oct 2021 05:28) (18 - 19)  SpO2: 98% (04 Oct 2021 05:28) (98% - 100%)    LABS:                        11.9   12.86 )-----------( 359      ( 04 Oct 2021 07:05 )             35.2     10-04    133<L>  |  96<L>  |  x   ----------------------------<  x   4.4   |  x   |  x     Ca    8.9      03 Oct 2021 07:37  Phos  2.3     10-03  Mg     2.50     10-04    TPro  8.3  /  Alb  3.4  /  TBili  0.5  /  DBili  x   /  AST  47<H>  /  ALT  42<H>  /  AlkPhos  155<H>  10-03        CAPILLARY BLOOD GLUCOSE      POCT Blood Glucose.: 159 mg/dL (04 Oct 2021 08:38)  POCT Blood Glucose.: 149 mg/dL (03 Oct 2021 21:31)  POCT Blood Glucose.: 229 mg/dL (03 Oct 2021 17:48)  POCT Blood Glucose.: 271 mg/dL (03 Oct 2021 12:18)      Lower Extremity Physical Exam:  Vascular: DP/PT 2/4, B/L, CFT <3 seconds B/L, Temperature gradient warm to warm B/L.   Neuro: Epicritic sensation intact to the level of digits, B/L.  Musculoskeletal/Ortho: unremarkable  Skin: left foot plantar hallux eschar with periwound healthy dermis, scant purulence, no malodor, edema noted to L ankle and lower leg with redness and warmth    RADIOLOGY & ADDITIONAL TESTS:

## 2021-10-04 NOTE — PROGRESS NOTE ADULT - ASSESSMENT
66M with left foot wound and ankle swelling 2/2 fall  - patient seen and evaluated  - afebrile, WBC 12.86  - LF/AXR: No gas, no OM, no fractures  - JASON: left foot plantar hallux eschar with periwound healthy dermis, no drainage, scant purulence, no malodor, edema noted to L ankle and lower leg with redness and warmth  - LF MR: no indication for soft tissue injury to ankle or OM  - Outpatient wound culture growing Group B Strep   - Recommend PO Augmentin for 10 days upon discharge   - Patient stable for discharge from podiatry standpoint   - f/u instructions and information can be found in the f/u section of the discharge provider note   - Discussed with attending

## 2021-10-04 NOTE — PROGRESS NOTE ADULT - PROBLEM SELECTOR PLAN 1
Infectious in the setting of poorly controlled DM2 s/p trauma --> open wound. Pt denies fevers, chills, or other systemic complaints. Likely a localized condition at this time with less concern for osteomylitis as wound does not probe to the bone as per podiatrist Dr. Thao.       -IV zosyn (10/1-)  -control hyperglycemia as listed below  - MRI not concerning for osteomylitis.   -wound culture sensitive for augmentin   -

## 2021-10-04 NOTE — PROGRESS NOTE ADULT - ATTENDING COMMENTS
67 yo M w/ hx DM, HTN p/w foot ulcer s/p debridement w/ outpatient podiatrist Dr. Donell Thao 9/29. Patient sent in for IV abx. MRI w/o Osteo. febrile 10/1 -101.1 Unclear cause, has remained afebrile since then. Patient was on vanc/zosyn-> de-escalated to zosyn after negative MRSA swab. Bcx have remained NGTD. Patient outpatient culture sensitive to augmentin. Per podiatry patient optimized for d'c on PO augmentin. Patient ordered for duplex for persistent pain, duplex pending. Patient also a diabetic on Lantus 45u and pre-meal 17u FSG currently in acceptable range. A1C 12.2. Patient remains admitted for management of foot pain. Patient will need wound care and home PT as outpatient. Will alert CM.     #Foot Ulcer   - Outpatient wound culture growing Group B Strep per podiatry   - Augmentin for 10 days upon discharge per podiatry   - will alert CM about need for home PT and wound care   - duplex for pain will attempt to expedite   - management of pain w/ oxy 2.5 q 6 hours prn for mod, tramadol 25mg q 4 hours prn for mod pain    Rest of Plan As detailed Above

## 2021-10-04 NOTE — PROGRESS NOTE ADULT - SUBJECTIVE AND OBJECTIVE BOX
PROGRESS NOTE:   Authored By: Kvng Cardoso MD     PGY-1, Internal Medicine  Pager: [phone], [phone]    Patient is a 66y old  Male who presents with a chief complaint of Foot abscess and Uncontrolled DM2 (03 Oct 2021 07:38)      OVERNIGHT EVENTS: No acute events overnight    SUBJECTIVE: Pt seen and examined at bedside this morning.     ADDITIONAL REVIEW OF SYSTEMS:    MEDICATIONS  (STANDING):  amLODIPine   Tablet 5 milliGRAM(s) Oral daily  collagenase Ointment 1 Application(s) Topical daily  dextrose 40% Gel 15 Gram(s) Oral once  dextrose 5%. 1000 milliLiter(s) (50 mL/Hr) IV Continuous <Continuous>  dextrose 5%. 1000 milliLiter(s) (100 mL/Hr) IV Continuous <Continuous>  dextrose 50% Injectable 25 Gram(s) IV Push once  dextrose 50% Injectable 12.5 Gram(s) IV Push once  dextrose 50% Injectable 25 Gram(s) IV Push once  enoxaparin Injectable 40 milliGRAM(s) SubCutaneous daily  gabapentin 300 milliGRAM(s) Oral at bedtime  glucagon  Injectable 1 milliGRAM(s) IntraMuscular once  glucagon  Injectable 1 milliGRAM(s) IntraMuscular once  influenza   Vaccine 0.5 milliLiter(s) IntraMuscular once  insulin glargine Injectable (LANTUS) 45 Unit(s) SubCutaneous two times a day  insulin lispro (ADMELOG) corrective regimen sliding scale   SubCutaneous three times a day before meals  insulin lispro (ADMELOG) corrective regimen sliding scale   SubCutaneous at bedtime  insulin lispro Injectable (ADMELOG) 17 Unit(s) SubCutaneous three times a day before meals  metoprolol succinate ER 50 milliGRAM(s) Oral daily  oxybutynin 5 milliGRAM(s) Oral at bedtime  piperacillin/tazobactam IVPB.. 3.375 Gram(s) IV Intermittent every 8 hours  valsartan 320 milliGRAM(s) Oral daily    MEDICATIONS  (PRN):  acetaminophen   Tablet .. 650 milliGRAM(s) Oral every 6 hours PRN Temp greater or equal to 38C (100.4F), Mild Pain (1 - 3)  aluminum hydroxide/magnesium hydroxide/simethicone Suspension 30 milliLiter(s) Oral every 4 hours PRN Dyspepsia  melatonin 3 milliGRAM(s) Oral at bedtime PRN Insomnia  ondansetron Injectable 4 milliGRAM(s) IV Push every 8 hours PRN Nausea and/or Vomiting  oxyCODONE    IR 2.5 milliGRAM(s) Oral every 6 hours PRN Severe Pain (7 - 10)  traMADol 25 milliGRAM(s) Oral every 6 hours PRN Moderate Pain (4 - 6)      CAPILLARY BLOOD GLUCOSE      POCT Blood Glucose.: 149 mg/dL (03 Oct 2021 21:31)  POCT Blood Glucose.: 229 mg/dL (03 Oct 2021 17:48)  POCT Blood Glucose.: 271 mg/dL (03 Oct 2021 12:18)  POCT Blood Glucose.: 281 mg/dL (03 Oct 2021 08:32)    I&O's Summary      PHYSICAL EXAM:  Vital Signs Last 24 Hrs  T(C): 36.6 (04 Oct 2021 05:28), Max: 36.9 (03 Oct 2021 22:55)  T(F): 97.9 (04 Oct 2021 05:28), Max: 98.5 (03 Oct 2021 22:55)  HR: 85 (04 Oct 2021 05:28) (85 - 98)  BP: 142/63 (04 Oct 2021 05:28) (142/63 - 166/92)  BP(mean): --  RR: 18 (04 Oct 2021 05:28) (18 - 19)  SpO2: 98% (04 Oct 2021 05:28) (98% - 100%)    CONSTITUTIONAL: NAD, well-developed  HEENT: NC/AT, EOMI  RESPIRATORY: No increased work of breathing, CTAB, no wheezes or crackles appreciated  CARDIOVASCULAR: RRR, S1 and S2 present, no m/r/g  ABDOMEN: soft, NT, ND, bowel sounds present  EXTREMITIES: No LE edema  MUSCULOSKELETAL: no joint swelling or tenderness to palpation  NEURO: A&Ox3, moving all extremities    LABS:                        11.9   12.86 )-----------( 359      ( 04 Oct 2021 07:05 )             35.2     10-03    130<L>  |  94<L>  |  13  ----------------------------<  216<H>  4.5   |  22  |  0.88    Ca    8.9      03 Oct 2021 07:37  Phos  2.3     10-03  Mg     2.40     10-03    TPro  8.3  /  Alb  3.4  /  TBili  0.5  /  DBili  x   /  AST  47<H>  /  ALT  42<H>  /  AlkPhos  155<H>  10-03              Culture - Blood (collected 02 Oct 2021 02:04)  Source: .Blood Blood  Preliminary Report (03 Oct 2021 03:01):    No growth to date.        RADIOLOGY & ADDITIONAL TESTS:    Results Reviewed:   Imaging Personally Reviewed:  Electrocardiogram Personally Reviewed:    COORDINATION OF CARE:  Care Discussed with Consultants/Other Providers [Y/N]:  Prior or Outpatient Records Reviewed [Y/N]:   PROGRESS NOTE:   Authored By: Kvng Cardoso MD     PGY-1, Internal Medicine  Pager: [phone], [phone]    Patient is a 66y old  Male who presents with a chief complaint of Foot abscess and Uncontrolled DM2 (03 Oct 2021 07:38)      OVERNIGHT EVENTS: No acute events overnight    SUBJECTIVE: Pt seen and examined at bedside this morning.     ADDITIONAL REVIEW OF SYSTEMS:    MEDICATIONS  (STANDING):  amLODIPine   Tablet 5 milliGRAM(s) Oral daily  collagenase Ointment 1 Application(s) Topical daily  dextrose 40% Gel 15 Gram(s) Oral once  dextrose 5%. 1000 milliLiter(s) (50 mL/Hr) IV Continuous <Continuous>  dextrose 5%. 1000 milliLiter(s) (100 mL/Hr) IV Continuous <Continuous>  dextrose 50% Injectable 25 Gram(s) IV Push once  dextrose 50% Injectable 12.5 Gram(s) IV Push once  dextrose 50% Injectable 25 Gram(s) IV Push once  enoxaparin Injectable 40 milliGRAM(s) SubCutaneous daily  gabapentin 300 milliGRAM(s) Oral at bedtime  glucagon  Injectable 1 milliGRAM(s) IntraMuscular once  glucagon  Injectable 1 milliGRAM(s) IntraMuscular once  influenza   Vaccine 0.5 milliLiter(s) IntraMuscular once  insulin glargine Injectable (LANTUS) 45 Unit(s) SubCutaneous two times a day  insulin lispro (ADMELOG) corrective regimen sliding scale   SubCutaneous three times a day before meals  insulin lispro (ADMELOG) corrective regimen sliding scale   SubCutaneous at bedtime  insulin lispro Injectable (ADMELOG) 17 Unit(s) SubCutaneous three times a day before meals  metoprolol succinate ER 50 milliGRAM(s) Oral daily  oxybutynin 5 milliGRAM(s) Oral at bedtime  piperacillin/tazobactam IVPB.. 3.375 Gram(s) IV Intermittent every 8 hours  valsartan 320 milliGRAM(s) Oral daily    MEDICATIONS  (PRN):  acetaminophen   Tablet .. 650 milliGRAM(s) Oral every 6 hours PRN Temp greater or equal to 38C (100.4F), Mild Pain (1 - 3)  aluminum hydroxide/magnesium hydroxide/simethicone Suspension 30 milliLiter(s) Oral every 4 hours PRN Dyspepsia  melatonin 3 milliGRAM(s) Oral at bedtime PRN Insomnia  ondansetron Injectable 4 milliGRAM(s) IV Push every 8 hours PRN Nausea and/or Vomiting  oxyCODONE    IR 2.5 milliGRAM(s) Oral every 6 hours PRN Severe Pain (7 - 10)  traMADol 25 milliGRAM(s) Oral every 6 hours PRN Moderate Pain (4 - 6)      CAPILLARY BLOOD GLUCOSE      POCT Blood Glucose.: 149 mg/dL (03 Oct 2021 21:31)  POCT Blood Glucose.: 229 mg/dL (03 Oct 2021 17:48)  POCT Blood Glucose.: 271 mg/dL (03 Oct 2021 12:18)  POCT Blood Glucose.: 281 mg/dL (03 Oct 2021 08:32)    I&O's Summary      PHYSICAL EXAM:  Vital Signs Last 24 Hrs  T(C): 36.6 (04 Oct 2021 05:28), Max: 36.9 (03 Oct 2021 22:55)  T(F): 97.9 (04 Oct 2021 05:28), Max: 98.5 (03 Oct 2021 22:55)  HR: 85 (04 Oct 2021 05:28) (85 - 98)  BP: 142/63 (04 Oct 2021 05:28) (142/63 - 166/92)  BP(mean): --  RR: 18 (04 Oct 2021 05:28) (18 - 19)  SpO2: 98% (04 Oct 2021 05:28) (98% - 100%)    CONSTITUTIONAL: laying in bed in pain due to his foot  HEENT: NC/AT, EOMI  RESPIRATORY: No increased work of breathing, CTAB, no wheezes or crackles appreciated  CARDIOVASCULAR: RRR, S1 and S2 present, no m/r/g  ABDOMEN: soft, NT, partially distended, bowel sounds present  EXTREMITIES: swelling in left ankle tender to touch  MUSCULOSKELETAL: no joint swelling or tenderness to palpation  NEURO: A&Ox3, moving all extremities    LABS:                        11.9   12.86 )-----------( 359      ( 04 Oct 2021 07:05 )             35.2     10-03    130<L>  |  94<L>  |  13  ----------------------------<  216<H>  4.5   |  22  |  0.88    Ca    8.9      03 Oct 2021 07:37  Phos  2.3     10-03  Mg     2.40     10-03    TPro  8.3  /  Alb  3.4  /  TBili  0.5  /  DBili  x   /  AST  47<H>  /  ALT  42<H>  /  AlkPhos  155<H>  10-03              Culture - Blood (collected 02 Oct 2021 02:04)  Source: .Blood Blood  Preliminary Report (03 Oct 2021 03:01):    No growth to date.        RADIOLOGY & ADDITIONAL TESTS:    Results Reviewed:   Imaging Personally Reviewed:  Electrocardiogram Personally Reviewed:    COORDINATION OF CARE:  Care Discussed with Consultants/Other Providers [Y/N]:  Prior or Outpatient Records Reviewed [Y/N]:

## 2021-10-05 LAB
A1C WITH ESTIMATED AVERAGE GLUCOSE RESULT: 12.1 % — HIGH (ref 4–5.6)
ANION GAP SERPL CALC-SCNC: 13 MMOL/L — SIGNIFICANT CHANGE UP (ref 7–14)
BUN SERPL-MCNC: 15 MG/DL — SIGNIFICANT CHANGE UP (ref 7–23)
CALCIUM SERPL-MCNC: 9.2 MG/DL — SIGNIFICANT CHANGE UP (ref 8.4–10.5)
CHLORIDE SERPL-SCNC: 96 MMOL/L — LOW (ref 98–107)
CO2 SERPL-SCNC: 22 MMOL/L — SIGNIFICANT CHANGE UP (ref 22–31)
CREAT SERPL-MCNC: 1.12 MG/DL — SIGNIFICANT CHANGE UP (ref 0.5–1.3)
CULTURE RESULTS: SIGNIFICANT CHANGE UP
CULTURE RESULTS: SIGNIFICANT CHANGE UP
ESTIMATED AVERAGE GLUCOSE: 301 — SIGNIFICANT CHANGE UP
GLUCOSE BLDC GLUCOMTR-MCNC: 106 MG/DL — HIGH (ref 70–99)
GLUCOSE BLDC GLUCOMTR-MCNC: 160 MG/DL — HIGH (ref 70–99)
GLUCOSE BLDC GLUCOMTR-MCNC: 172 MG/DL — HIGH (ref 70–99)
GLUCOSE BLDC GLUCOMTR-MCNC: 196 MG/DL — HIGH (ref 70–99)
GLUCOSE BLDC GLUCOMTR-MCNC: 79 MG/DL — SIGNIFICANT CHANGE UP (ref 70–99)
GLUCOSE SERPL-MCNC: 131 MG/DL — HIGH (ref 70–99)
HCT VFR BLD CALC: 32.6 % — LOW (ref 39–50)
HGB BLD-MCNC: 11.1 G/DL — LOW (ref 13–17)
MAGNESIUM SERPL-MCNC: 2.2 MG/DL — SIGNIFICANT CHANGE UP (ref 1.6–2.6)
MCHC RBC-ENTMCNC: 26.8 PG — LOW (ref 27–34)
MCHC RBC-ENTMCNC: 34 GM/DL — SIGNIFICANT CHANGE UP (ref 32–36)
MCV RBC AUTO: 78.7 FL — LOW (ref 80–100)
NRBC # BLD: 0 /100 WBCS — SIGNIFICANT CHANGE UP
NRBC # FLD: 0 K/UL — SIGNIFICANT CHANGE UP
PHOSPHATE SERPL-MCNC: 4.4 MG/DL — SIGNIFICANT CHANGE UP (ref 2.5–4.5)
PLATELET # BLD AUTO: 375 K/UL — SIGNIFICANT CHANGE UP (ref 150–400)
POTASSIUM SERPL-MCNC: 5.2 MMOL/L — SIGNIFICANT CHANGE UP (ref 3.5–5.3)
POTASSIUM SERPL-SCNC: 5.2 MMOL/L — SIGNIFICANT CHANGE UP (ref 3.5–5.3)
RBC # BLD: 4.14 M/UL — LOW (ref 4.2–5.8)
RBC # FLD: 13.2 % — SIGNIFICANT CHANGE UP (ref 10.3–14.5)
SODIUM SERPL-SCNC: 131 MMOL/L — LOW (ref 135–145)
SPECIMEN SOURCE: SIGNIFICANT CHANGE UP
SPECIMEN SOURCE: SIGNIFICANT CHANGE UP
WBC # BLD: 11.72 K/UL — HIGH (ref 3.8–10.5)
WBC # FLD AUTO: 11.72 K/UL — HIGH (ref 3.8–10.5)

## 2021-10-05 PROCEDURE — 99232 SBSQ HOSP IP/OBS MODERATE 35: CPT | Mod: GC

## 2021-10-05 PROCEDURE — 93970 EXTREMITY STUDY: CPT | Mod: 26

## 2021-10-05 RX ORDER — CYCLOBENZAPRINE HYDROCHLORIDE 10 MG/1
5 TABLET, FILM COATED ORAL THREE TIMES A DAY
Refills: 0 | Status: DISCONTINUED | OUTPATIENT
Start: 2021-10-05 | End: 2021-10-06

## 2021-10-05 RX ORDER — AMPICILLIN SODIUM AND SULBACTAM SODIUM 250; 125 MG/ML; MG/ML
3 INJECTION, POWDER, FOR SUSPENSION INTRAMUSCULAR; INTRAVENOUS EVERY 6 HOURS
Refills: 0 | Status: DISCONTINUED | OUTPATIENT
Start: 2021-10-05 | End: 2021-10-06

## 2021-10-05 RX ORDER — GABAPENTIN 400 MG/1
300 CAPSULE ORAL THREE TIMES A DAY
Refills: 0 | Status: DISCONTINUED | OUTPATIENT
Start: 2021-10-05 | End: 2021-10-06

## 2021-10-05 RX ADMIN — CYCLOBENZAPRINE HYDROCHLORIDE 5 MILLIGRAM(S): 10 TABLET, FILM COATED ORAL at 09:01

## 2021-10-05 RX ADMIN — Medication 2: at 17:51

## 2021-10-05 RX ADMIN — PIPERACILLIN AND TAZOBACTAM 25 GRAM(S): 4; .5 INJECTION, POWDER, LYOPHILIZED, FOR SOLUTION INTRAVENOUS at 05:35

## 2021-10-05 RX ADMIN — Medication 2: at 08:56

## 2021-10-05 RX ADMIN — GABAPENTIN 300 MILLIGRAM(S): 400 CAPSULE ORAL at 22:42

## 2021-10-05 RX ADMIN — OXYCODONE HYDROCHLORIDE 2.5 MILLIGRAM(S): 5 TABLET ORAL at 18:55

## 2021-10-05 RX ADMIN — INSULIN GLARGINE 45 UNIT(S): 100 INJECTION, SOLUTION SUBCUTANEOUS at 09:32

## 2021-10-05 RX ADMIN — OXYCODONE HYDROCHLORIDE 2.5 MILLIGRAM(S): 5 TABLET ORAL at 17:55

## 2021-10-05 RX ADMIN — Medication 1 APPLICATION(S): at 12:23

## 2021-10-05 RX ADMIN — Medication 17 UNIT(S): at 08:57

## 2021-10-05 RX ADMIN — OXYCODONE HYDROCHLORIDE 2.5 MILLIGRAM(S): 5 TABLET ORAL at 09:56

## 2021-10-05 RX ADMIN — Medication 50 MILLIGRAM(S): at 05:35

## 2021-10-05 RX ADMIN — Medication 5 MILLIGRAM(S): at 22:42

## 2021-10-05 RX ADMIN — Medication 2: at 12:21

## 2021-10-05 RX ADMIN — AMLODIPINE BESYLATE 5 MILLIGRAM(S): 2.5 TABLET ORAL at 05:35

## 2021-10-05 RX ADMIN — INSULIN GLARGINE 45 UNIT(S): 100 INJECTION, SOLUTION SUBCUTANEOUS at 23:36

## 2021-10-05 RX ADMIN — OXYCODONE HYDROCHLORIDE 2.5 MILLIGRAM(S): 5 TABLET ORAL at 10:56

## 2021-10-05 RX ADMIN — AMPICILLIN SODIUM AND SULBACTAM SODIUM 200 GRAM(S): 250; 125 INJECTION, POWDER, FOR SUSPENSION INTRAMUSCULAR; INTRAVENOUS at 17:11

## 2021-10-05 RX ADMIN — Medication 17 UNIT(S): at 17:52

## 2021-10-05 RX ADMIN — ENOXAPARIN SODIUM 40 MILLIGRAM(S): 100 INJECTION SUBCUTANEOUS at 12:23

## 2021-10-05 RX ADMIN — VALSARTAN 320 MILLIGRAM(S): 80 TABLET ORAL at 05:35

## 2021-10-05 RX ADMIN — Medication 17 UNIT(S): at 12:22

## 2021-10-05 NOTE — PROGRESS NOTE ADULT - SUBJECTIVE AND OBJECTIVE BOX
PROGRESS NOTE:   Authored By: Kvng Cardoso MD     PGY-1, Internal Medicine  Pager: [phone], [phone]    Patient is a 66y old  Male who presents with a chief complaint of Foot abscess and Uncontrolled DM2 (04 Oct 2021 10:06)      OVERNIGHT EVENTS: No acute events overnight    SUBJECTIVE: Pt seen and examined at bedside this morning.     ADDITIONAL REVIEW OF SYSTEMS:    MEDICATIONS  (STANDING):  amLODIPine   Tablet 5 milliGRAM(s) Oral daily  collagenase Ointment 1 Application(s) Topical daily  dextrose 40% Gel 15 Gram(s) Oral once  dextrose 5%. 1000 milliLiter(s) (50 mL/Hr) IV Continuous <Continuous>  dextrose 5%. 1000 milliLiter(s) (100 mL/Hr) IV Continuous <Continuous>  dextrose 50% Injectable 25 Gram(s) IV Push once  dextrose 50% Injectable 12.5 Gram(s) IV Push once  dextrose 50% Injectable 25 Gram(s) IV Push once  enoxaparin Injectable 40 milliGRAM(s) SubCutaneous daily  gabapentin 300 milliGRAM(s) Oral at bedtime  glucagon  Injectable 1 milliGRAM(s) IntraMuscular once  glucagon  Injectable 1 milliGRAM(s) IntraMuscular once  influenza   Vaccine 0.5 milliLiter(s) IntraMuscular once  insulin glargine Injectable (LANTUS) 45 Unit(s) SubCutaneous two times a day  insulin lispro (ADMELOG) corrective regimen sliding scale   SubCutaneous three times a day before meals  insulin lispro (ADMELOG) corrective regimen sliding scale   SubCutaneous at bedtime  insulin lispro Injectable (ADMELOG) 17 Unit(s) SubCutaneous three times a day before meals  metoprolol succinate ER 50 milliGRAM(s) Oral daily  oxybutynin 5 milliGRAM(s) Oral at bedtime  piperacillin/tazobactam IVPB.. 3.375 Gram(s) IV Intermittent every 8 hours  valsartan 320 milliGRAM(s) Oral daily    MEDICATIONS  (PRN):  acetaminophen   Tablet .. 650 milliGRAM(s) Oral every 6 hours PRN Temp greater or equal to 38C (100.4F), Mild Pain (1 - 3)  aluminum hydroxide/magnesium hydroxide/simethicone Suspension 30 milliLiter(s) Oral every 4 hours PRN Dyspepsia  cyclobenzaprine 5 milliGRAM(s) Oral three times a day PRN Muscle Spasm  melatonin 3 milliGRAM(s) Oral at bedtime PRN Insomnia  ondansetron Injectable 4 milliGRAM(s) IV Push every 8 hours PRN Nausea and/or Vomiting  oxyCODONE    IR 2.5 milliGRAM(s) Oral every 6 hours PRN Severe Pain (7 - 10)  traMADol 25 milliGRAM(s) Oral every 6 hours PRN Moderate Pain (4 - 6)      CAPILLARY BLOOD GLUCOSE      POCT Blood Glucose.: 109 mg/dL (04 Oct 2021 21:53)  POCT Blood Glucose.: 188 mg/dL (04 Oct 2021 17:26)  POCT Blood Glucose.: 174 mg/dL (04 Oct 2021 12:14)    I&O's Summary      PHYSICAL EXAM:  Vital Signs Last 24 Hrs  T(C): 36.7 (05 Oct 2021 05:31), Max: 37.2 (04 Oct 2021 21:50)  T(F): 98 (05 Oct 2021 05:31), Max: 98.9 (04 Oct 2021 21:50)  HR: 98 (05 Oct 2021 05:31) (92 - 100)  BP: 151/86 (05 Oct 2021 05:31) (132/80 - 151/86)  BP(mean): --  RR: 18 (05 Oct 2021 05:31) (17 - 18)  SpO2: 99% (05 Oct 2021 05:31) (99% - 100%)    CONSTITUTIONAL: NAD, well-developed  HEENT: NC/AT, EOMI  RESPIRATORY: No increased work of breathing, CTAB, no wheezes or crackles appreciated  CARDIOVASCULAR: RRR, S1 and S2 present, no m/r/g  ABDOMEN: soft, NT, ND, bowel sounds present  EXTREMITIES: No LE edema  MUSCULOSKELETAL: no joint swelling or tenderness to palpation  NEURO: A&Ox3, moving all extremities    LABS:                        11.1   11.72 )-----------( 375      ( 05 Oct 2021 07:45 )             32.6     10-04    133<L>  |  96<L>  |  9   ----------------------------<  115<H>  4.4   |  23  |  1.02    Ca    9.0      04 Oct 2021 07:05  Phos  3.6     10-04  Mg     2.50     10-04                  RADIOLOGY & ADDITIONAL TESTS:    Results Reviewed:   Imaging Personally Reviewed:  Electrocardiogram Personally Reviewed:    COORDINATION OF CARE:  Care Discussed with Consultants/Other Providers [Y/N]:  Prior or Outpatient Records Reviewed [Y/N]:   PROGRESS NOTE:   Authored By: Kvng Cardoso MD     PGY-1, Internal Medicine  Pager: [phone], [phone]    Patient is a 66y old  Male who presents with a chief complaint of Foot abscess and Uncontrolled DM2 (04 Oct 2021 10:06)      OVERNIGHT EVENTS: No acute events overnight    SUBJECTIVE: Pt seen and examined at bedside this morning. Pt is still in pain    ADDITIONAL REVIEW OF SYSTEMS:    MEDICATIONS  (STANDING):  amLODIPine   Tablet 5 milliGRAM(s) Oral daily  collagenase Ointment 1 Application(s) Topical daily  dextrose 40% Gel 15 Gram(s) Oral once  dextrose 5%. 1000 milliLiter(s) (50 mL/Hr) IV Continuous <Continuous>  dextrose 5%. 1000 milliLiter(s) (100 mL/Hr) IV Continuous <Continuous>  dextrose 50% Injectable 25 Gram(s) IV Push once  dextrose 50% Injectable 12.5 Gram(s) IV Push once  dextrose 50% Injectable 25 Gram(s) IV Push once  enoxaparin Injectable 40 milliGRAM(s) SubCutaneous daily  gabapentin 300 milliGRAM(s) Oral at bedtime  glucagon  Injectable 1 milliGRAM(s) IntraMuscular once  glucagon  Injectable 1 milliGRAM(s) IntraMuscular once  influenza   Vaccine 0.5 milliLiter(s) IntraMuscular once  insulin glargine Injectable (LANTUS) 45 Unit(s) SubCutaneous two times a day  insulin lispro (ADMELOG) corrective regimen sliding scale   SubCutaneous three times a day before meals  insulin lispro (ADMELOG) corrective regimen sliding scale   SubCutaneous at bedtime  insulin lispro Injectable (ADMELOG) 17 Unit(s) SubCutaneous three times a day before meals  metoprolol succinate ER 50 milliGRAM(s) Oral daily  oxybutynin 5 milliGRAM(s) Oral at bedtime  piperacillin/tazobactam IVPB.. 3.375 Gram(s) IV Intermittent every 8 hours  valsartan 320 milliGRAM(s) Oral daily    MEDICATIONS  (PRN):  acetaminophen   Tablet .. 650 milliGRAM(s) Oral every 6 hours PRN Temp greater or equal to 38C (100.4F), Mild Pain (1 - 3)  aluminum hydroxide/magnesium hydroxide/simethicone Suspension 30 milliLiter(s) Oral every 4 hours PRN Dyspepsia  cyclobenzaprine 5 milliGRAM(s) Oral three times a day PRN Muscle Spasm  melatonin 3 milliGRAM(s) Oral at bedtime PRN Insomnia  ondansetron Injectable 4 milliGRAM(s) IV Push every 8 hours PRN Nausea and/or Vomiting  oxyCODONE    IR 2.5 milliGRAM(s) Oral every 6 hours PRN Severe Pain (7 - 10)  traMADol 25 milliGRAM(s) Oral every 6 hours PRN Moderate Pain (4 - 6)      CAPILLARY BLOOD GLUCOSE      POCT Blood Glucose.: 109 mg/dL (04 Oct 2021 21:53)  POCT Blood Glucose.: 188 mg/dL (04 Oct 2021 17:26)  POCT Blood Glucose.: 174 mg/dL (04 Oct 2021 12:14)    I&O's Summary      PHYSICAL EXAM:  Vital Signs Last 24 Hrs  T(C): 36.7 (05 Oct 2021 05:31), Max: 37.2 (04 Oct 2021 21:50)  T(F): 98 (05 Oct 2021 05:31), Max: 98.9 (04 Oct 2021 21:50)  HR: 98 (05 Oct 2021 05:31) (92 - 100)  BP: 151/86 (05 Oct 2021 05:31) (132/80 - 151/86)  BP(mean): --  RR: 18 (05 Oct 2021 05:31) (17 - 18)  SpO2: 99% (05 Oct 2021 05:31) (99% - 100%)      CONSTITUTIONAL: laying in bed in pain due to his foot  HEENT: NC/AT, EOMI  RESPIRATORY: No increased work of breathing, CTAB, no wheezes or crackles appreciated  CARDIOVASCULAR: RRR, S1 and S2 present, no m/r/g  ABDOMEN: soft, NT, partially distended, bowel sounds present  EXTREMITIES: swelling in left ankle tender to touch  MUSCULOSKELETAL: no joint swelling or tenderness to palpation  NEURO: A&Ox3, moving all extremities    LABS:                        11.1   11.72 )-----------( 375      ( 05 Oct 2021 07:45 )             32.6     10-04    133<L>  |  96<L>  |  9   ----------------------------<  115<H>  4.4   |  23  |  1.02    Ca    9.0      04 Oct 2021 07:05  Phos  3.6     10-04  Mg     2.50     10-04                  RADIOLOGY & ADDITIONAL TESTS:    Results Reviewed:   Imaging Personally Reviewed:  Electrocardiogram Personally Reviewed:    COORDINATION OF CARE:  Care Discussed with Consultants/Other Providers [Y/N]:  Prior or Outpatient Records Reviewed [Y/N]:

## 2021-10-05 NOTE — PROGRESS NOTE ADULT - ATTENDING COMMENTS
67 yo M w/ hx DM, HTN p/w foot ulcer s/p debridement w/ outpatient podiatrist Dr. Donell Thao 9/29. Patient sent in for IV abx. MRI w/o Osteo. febrile 10/1 -101.1 Unclear cause, has remained afebrile since then. Patient was on vanc/zosyn-> de-escalated to zosyn after negative MRSA swab. De-escalating to unasyn today based off outpatient culture. Bcx have remained NGTD. Per podiatry patient optimized for d'c on PO augmentin. Patient pain however remains uncontrolled. Patient ordered for duplex for persistent pain 10/5, duplex negative. Patient also a diabetic on Lantus 45u and pre-meal 17u FSG currently in acceptable range. A1C 12.2. Patient remains admitted for management of foot pain. Patient will need wound care and home PT as outpatient. CM alerted.    #Foot Ulcer   - Outpatient wound culture growing Group B Strep per podiatry   - Augmentin for 10 days upon discharge per podiatry, de-escalating to Unasyn inpatient 10/5, will transition to Augmentin on d'c   - CM alerted about need for home PT and wound care   - duplex--> negative  - up-titrate gabapentin to 300mg TID from 300mg qhs    #Hyponatremia  - fluid restrict   - urine lytes, patient euvolemic on exam likely 2/2 to SIADH from pain will fluid restrict    Rest of Plan As detailed Above.

## 2021-10-05 NOTE — PROGRESS NOTE ADULT - PROBLEM SELECTOR PLAN 1
Infectious in the setting of poorly controlled DM2 s/p trauma --> open wound. Pt denies fevers, chills, or other systemic complaints. Likely a localized condition at this time with less concern for osteomyelitis as wound does not probe to the bone as per podiatrist Dr. Thao.       -IV zosyn (10/1-)  -control hyperglycemia as listed below  - MRI not concerning for osteomylitis.   -wound culture sensitive for augmentin   - Infectious in the setting of poorly controlled DM2 s/p trauma --> open wound. Pt denies fevers, chills, or other systemic complaints. Likely a localized condition at this time with less concern for osteomyelitis as wound does not probe to the bone as per podiatrist Dr. Thao.       -IV zosyn (10/1-)  -podiatry recs for augmentin in the outpt  -control hyperglycemia as listed below  - MRI not concerning for osteomylitis.   -wound culture sensitive for augmentin   -

## 2021-10-06 ENCOUNTER — TRANSCRIPTION ENCOUNTER (OUTPATIENT)
Age: 66
End: 2021-10-06

## 2021-10-06 VITALS
RESPIRATION RATE: 18 BRPM | OXYGEN SATURATION: 99 % | DIASTOLIC BLOOD PRESSURE: 95 MMHG | TEMPERATURE: 99 F | HEART RATE: 90 BPM | SYSTOLIC BLOOD PRESSURE: 147 MMHG

## 2021-10-06 LAB
ALBUMIN SERPL ELPH-MCNC: 3.1 G/DL — LOW (ref 3.3–5)
ALP SERPL-CCNC: 156 U/L — HIGH (ref 40–120)
ALT FLD-CCNC: 35 U/L — SIGNIFICANT CHANGE UP (ref 4–41)
ANION GAP SERPL CALC-SCNC: 12 MMOL/L — SIGNIFICANT CHANGE UP (ref 7–14)
AST SERPL-CCNC: 33 U/L — SIGNIFICANT CHANGE UP (ref 4–40)
BILIRUB SERPL-MCNC: 0.3 MG/DL — SIGNIFICANT CHANGE UP (ref 0.2–1.2)
BUN SERPL-MCNC: 17 MG/DL — SIGNIFICANT CHANGE UP (ref 7–23)
CALCIUM SERPL-MCNC: 9.2 MG/DL — SIGNIFICANT CHANGE UP (ref 8.4–10.5)
CHLORIDE SERPL-SCNC: 97 MMOL/L — LOW (ref 98–107)
CO2 SERPL-SCNC: 22 MMOL/L — SIGNIFICANT CHANGE UP (ref 22–31)
CREAT SERPL-MCNC: 1.07 MG/DL — SIGNIFICANT CHANGE UP (ref 0.5–1.3)
GLUCOSE BLDC GLUCOMTR-MCNC: 153 MG/DL — HIGH (ref 70–99)
GLUCOSE BLDC GLUCOMTR-MCNC: 224 MG/DL — HIGH (ref 70–99)
GLUCOSE SERPL-MCNC: 143 MG/DL — HIGH (ref 70–99)
HCT VFR BLD CALC: 31.5 % — LOW (ref 39–50)
HGB BLD-MCNC: 10.8 G/DL — LOW (ref 13–17)
MAGNESIUM SERPL-MCNC: 2.1 MG/DL — SIGNIFICANT CHANGE UP (ref 1.6–2.6)
MCHC RBC-ENTMCNC: 26.9 PG — LOW (ref 27–34)
MCHC RBC-ENTMCNC: 34.3 GM/DL — SIGNIFICANT CHANGE UP (ref 32–36)
MCV RBC AUTO: 78.4 FL — LOW (ref 80–100)
NRBC # BLD: 0 /100 WBCS — SIGNIFICANT CHANGE UP
NRBC # FLD: 0 K/UL — SIGNIFICANT CHANGE UP
PHOSPHATE SERPL-MCNC: 4.7 MG/DL — HIGH (ref 2.5–4.5)
PLATELET # BLD AUTO: 401 K/UL — HIGH (ref 150–400)
POTASSIUM SERPL-MCNC: 4.7 MMOL/L — SIGNIFICANT CHANGE UP (ref 3.5–5.3)
POTASSIUM SERPL-SCNC: 4.7 MMOL/L — SIGNIFICANT CHANGE UP (ref 3.5–5.3)
PROT SERPL-MCNC: 7.5 G/DL — SIGNIFICANT CHANGE UP (ref 6–8.3)
RBC # BLD: 4.02 M/UL — LOW (ref 4.2–5.8)
RBC # FLD: 13.1 % — SIGNIFICANT CHANGE UP (ref 10.3–14.5)
SODIUM SERPL-SCNC: 131 MMOL/L — LOW (ref 135–145)
WBC # BLD: 11.34 K/UL — HIGH (ref 3.8–10.5)
WBC # FLD AUTO: 11.34 K/UL — HIGH (ref 3.8–10.5)

## 2021-10-06 PROCEDURE — 99239 HOSP IP/OBS DSCHRG MGMT >30: CPT | Mod: GC

## 2021-10-06 RX ORDER — GABAPENTIN 400 MG/1
1 CAPSULE ORAL
Qty: 90 | Refills: 0
Start: 2021-10-06 | End: 2021-11-04

## 2021-10-06 RX ORDER — OXYCODONE HYDROCHLORIDE 5 MG/1
0.5 TABLET ORAL
Qty: 10 | Refills: 0
Start: 2021-10-06 | End: 2021-10-10

## 2021-10-06 RX ORDER — INSULIN LISPRO 100/ML
18 VIAL (ML) SUBCUTANEOUS
Qty: 0 | Refills: 0 | DISCHARGE

## 2021-10-06 RX ORDER — VALSARTAN 80 MG/1
1 TABLET ORAL
Qty: 0 | Refills: 0 | DISCHARGE
Start: 2021-10-06

## 2021-10-06 RX ORDER — GABAPENTIN 400 MG/1
1 CAPSULE ORAL
Qty: 0 | Refills: 0 | DISCHARGE

## 2021-10-06 RX ORDER — INSULIN LISPRO 100/ML
20 VIAL (ML) SUBCUTANEOUS
Qty: 0 | Refills: 0 | DISCHARGE

## 2021-10-06 RX ORDER — AZITHROMYCIN 500 MG/1
1 TABLET, FILM COATED ORAL
Qty: 0 | Refills: 0 | DISCHARGE

## 2021-10-06 RX ADMIN — INSULIN GLARGINE 45 UNIT(S): 100 INJECTION, SOLUTION SUBCUTANEOUS at 09:04

## 2021-10-06 RX ADMIN — GABAPENTIN 300 MILLIGRAM(S): 400 CAPSULE ORAL at 05:46

## 2021-10-06 RX ADMIN — AMPICILLIN SODIUM AND SULBACTAM SODIUM 200 GRAM(S): 250; 125 INJECTION, POWDER, FOR SUSPENSION INTRAMUSCULAR; INTRAVENOUS at 12:22

## 2021-10-06 RX ADMIN — AMPICILLIN SODIUM AND SULBACTAM SODIUM 200 GRAM(S): 250; 125 INJECTION, POWDER, FOR SUSPENSION INTRAMUSCULAR; INTRAVENOUS at 01:35

## 2021-10-06 RX ADMIN — Medication 2: at 09:05

## 2021-10-06 RX ADMIN — Medication 17 UNIT(S): at 09:06

## 2021-10-06 RX ADMIN — ENOXAPARIN SODIUM 40 MILLIGRAM(S): 100 INJECTION SUBCUTANEOUS at 12:21

## 2021-10-06 RX ADMIN — VALSARTAN 320 MILLIGRAM(S): 80 TABLET ORAL at 05:46

## 2021-10-06 RX ADMIN — TRAMADOL HYDROCHLORIDE 25 MILLIGRAM(S): 50 TABLET ORAL at 05:46

## 2021-10-06 RX ADMIN — Medication 50 MILLIGRAM(S): at 05:46

## 2021-10-06 RX ADMIN — Medication 17 UNIT(S): at 12:20

## 2021-10-06 RX ADMIN — Medication 1 APPLICATION(S): at 12:19

## 2021-10-06 RX ADMIN — AMPICILLIN SODIUM AND SULBACTAM SODIUM 200 GRAM(S): 250; 125 INJECTION, POWDER, FOR SUSPENSION INTRAMUSCULAR; INTRAVENOUS at 06:19

## 2021-10-06 RX ADMIN — AMLODIPINE BESYLATE 5 MILLIGRAM(S): 2.5 TABLET ORAL at 05:46

## 2021-10-06 RX ADMIN — OXYCODONE HYDROCHLORIDE 2.5 MILLIGRAM(S): 5 TABLET ORAL at 01:00

## 2021-10-06 RX ADMIN — GABAPENTIN 300 MILLIGRAM(S): 400 CAPSULE ORAL at 13:14

## 2021-10-06 RX ADMIN — Medication 4: at 12:20

## 2021-10-06 RX ADMIN — OXYCODONE HYDROCHLORIDE 2.5 MILLIGRAM(S): 5 TABLET ORAL at 01:40

## 2021-10-06 NOTE — PROGRESS NOTE ADULT - SUBJECTIVE AND OBJECTIVE BOX
Patient is a 66y old  Male who presents with a chief complaint of Foot abscess and Uncontrolled DM2 (06 Oct 2021 07:30)       INTERVAL HPI/OVERNIGHT EVENTS:  Patient seen and evaluated at bedside.  Pt is resting comfortable in NAD. Denies N/V/F/C.  Pain rated at X/10    Allergies    No Known Allergies    Intolerances        Vital Signs Last 24 Hrs  T(C): 36.9 (06 Oct 2021 05:43), Max: 37.8 (05 Oct 2021 20:46)  T(F): 98.5 (06 Oct 2021 05:43), Max: 100 (05 Oct 2021 20:46)  HR: 92 (06 Oct 2021 05:43) (85 - 103)  BP: 160/94 (06 Oct 2021 05:43) (138/85 - 160/94)  BP(mean): --  RR: 17 (06 Oct 2021 05:43) (17 - 18)  SpO2: 98% (06 Oct 2021 05:43) (97% - 98%)    LABS:                        10.8   11.34 )-----------( 401      ( 06 Oct 2021 07:18 )             31.5     10-06    131<L>  |  97<L>  |  17  ----------------------------<  143<H>  4.7   |  22  |  1.07    Ca    9.2      06 Oct 2021 07:18  Phos  4.7     10-06  Mg     2.10     10-06    TPro  7.5  /  Alb  3.1<L>  /  TBili  0.3  /  DBili  x   /  AST  33  /  ALT  35  /  AlkPhos  156<H>  10-06        CAPILLARY BLOOD GLUCOSE      POCT Blood Glucose.: 153 mg/dL (06 Oct 2021 09:01)  POCT Blood Glucose.: 106 mg/dL (05 Oct 2021 23:08)  POCT Blood Glucose.: 79 mg/dL (05 Oct 2021 22:32)  POCT Blood Glucose.: 160 mg/dL (05 Oct 2021 17:40)  POCT Blood Glucose.: 196 mg/dL (05 Oct 2021 12:12)      Lower Extremity Physical Exam:  Vascular: DP/PT 2/4, B/L, CFT <3 seconds B/L, Temperature gradient warm to warm B/L.   Neuro: Epicritic sensation intact to the level of digits, B/L.  Musculoskeletal/Ortho: unremarkable  Skin: left foot plantar hallux eschar with periwound healthy dermis, scant purulence, no malodor, edema and erythema noted diffusely to L ankle and distal lower leg with warmth    RADIOLOGY & ADDITIONAL TESTS:

## 2021-10-06 NOTE — PROGRESS NOTE ADULT - PROBLEM SELECTOR PLAN 4
-cont at home lipitor 40 QD    DVT: Lovenox  Diet: Consisent carb  Dispo: pending dvt study prior dc with home PT -cont at home lipitor 40 QD    DVT: Lovenox  Diet: Consisent carb  Dispo: d/c home today

## 2021-10-06 NOTE — PROGRESS NOTE ADULT - PROBLEM SELECTOR PLAN 3
pt normotensive at this time  -hold valsartan 320 QD now as pt is hyperkalemic, reassess tomorrow after BMP shows normal K  -cont amlodipine 5mg for now
pt BP slowly climbing at this time  -valsartan 320 QD, reassess BMP daily  -cont amlodipine 5mg for now
-valsartan 320 QD, reassess BMP daily  -cont amlodipine 5mg for now
-valsartan 320 QD, reassess BMP daily  -cont amlodipine 5mg for now
pt BP slowly climbing at this time  -valsartan 320 QD, reassess BMP daily  -cont amlodipine 5mg for now
-valsartan 320 QD, reassess BMP daily  -cont amlodipine 5mg for now

## 2021-10-06 NOTE — PROGRESS NOTE ADULT - ATTENDING COMMENTS
65 yo M w/ hx DM, HTN p/w foot ulcer s/p debridement w/ outpatient podiatrist Dr. Donell Thao 9/29. Patient sent in for IV abx. MRI w/o Osteo. febrile 10/1 -101.1 Unclear cause, has remained afebrile since then. Patient was on vanc/zosyn-> de-escalated to zosyn after negative MRSA swab. De-escalated to unasyn based off outpatient culture. Bcx have remained NGTD. Per podiatry patient optimized for d'c on PO augmentin. Patient pain now controlled. Patient for d'c home today w/ outpatient pods follow up.    D'c on Augmentin and oxycodone for pain, >37 minutes spent on discharge planning

## 2021-10-06 NOTE — PROGRESS NOTE ADULT - PROVIDER SPECIALTY LIST ADULT
Internal Medicine
Podiatry
Internal Medicine
Internal Medicine
Podiatry
Internal Medicine
Internal Medicine

## 2021-10-06 NOTE — PROGRESS NOTE ADULT - REASON FOR ADMISSION
Foot abscess and Uncontrolled DM2

## 2021-10-06 NOTE — PROGRESS NOTE ADULT - ASSESSMENT
66M with left foot wound and ankle swelling 2/2 fall  - patient seen and evaluated  - afebrile, WBC 11.34  - LF/AXR: No gas, no OM, no fractures  - JASON: left foot plantar hallux eschar with periwound healthy dermis, scant purulence, no malodor, edema and erythema noted diffusely to L ankle and distal lower leg with warmth  - LF MR: no indication for soft tissue injury to ankle or OM  - Outpatient wound culture growing Group B Strep   - Recommend PO Augmentin for 10 days upon discharge   - Patient stable for discharge from podiatry standpoint   - f/u instructions and information can be found in the f/u section of the discharge provider note   - Discussed with attending

## 2021-10-06 NOTE — DISCHARGE NOTE NURSING/CASE MANAGEMENT/SOCIAL WORK - PATIENT PORTAL LINK FT
You can access the FollowMyHealth Patient Portal offered by [facility] by registering at the following website: [email or web address]. By joining [employer]’s FollowMyHealth portal, you will also be able to view your health information using other applications (apps) compatible with our system.

## 2021-10-06 NOTE — PROGRESS NOTE ADULT - PROBLEM SELECTOR PLAN 1
Infectious in the setting of poorly controlled DM2 s/p trauma --> open wound. Pt denies fevers, chills, or other systemic complaints. Likely a localized condition at this time with less concern for osteomyelitis as wound does not probe to the bone as per podiatrist Dr. Thao.       -IV zosyn (10/1-)  -podiatry recs for augmentin in the outpt  -control hyperglycemia as listed below  - MRI not concerning for osteomylitis.   -wound culture sensitive for augmentin   - Infectious in the setting of poorly controlled DM2 s/p trauma --> open wound. Pt denies fevers, chills, or other systemic complaints. Likely a localized condition at this time with less concern for osteomyelitis as wound does not probe to the bone as per podiatrist Dr. Thao.       -transition to augmentin 10 days  -control hyperglycemia as listed below  - MRI not concerning for osteomylitis.   -wound culture sensitive for augmentin   -

## 2021-10-06 NOTE — DISCHARGE NOTE NURSING/CASE MANAGEMENT/SOCIAL WORK - NSDCFUADDAPPT_GEN_ALL_CORE_FT
Please see your PCP in 1-2 weeks to discuss this hospital visit    Please see your podiatrist Dr. Thao in 1 week to discuss your foot health.    Please see an endocrinologist, I have included the contact of [facility], to better control your blood sugar long term. DO this in 1-2 weeks.

## 2021-10-06 NOTE — PROGRESS NOTE ADULT - SUBJECTIVE AND OBJECTIVE BOX
PROGRESS NOTE:   Authored By: Kvng Cardoso MD     PGY-1, Internal Medicine  Pager: [phone], VOX [phone]    Patient is a 66y old  Male who presents with a chief complaint of Foot abscess and Uncontrolled DM2 (05 Oct 2021 08:38)      OVERNIGHT EVENTS: No acute events overnight    SUBJECTIVE: Pt seen and examined at bedside this morning.     ADDITIONAL REVIEW OF SYSTEMS:    MEDICATIONS  (STANDING):  amLODIPine   Tablet 5 milliGRAM(s) Oral daily  ampicillin/sulbactam  IVPB 3 Gram(s) IV Intermittent every 6 hours  collagenase Ointment 1 Application(s) Topical daily  dextrose 40% Gel 15 Gram(s) Oral once  dextrose 5%. 1000 milliLiter(s) (50 mL/Hr) IV Continuous <Continuous>  dextrose 5%. 1000 milliLiter(s) (100 mL/Hr) IV Continuous <Continuous>  dextrose 50% Injectable 25 Gram(s) IV Push once  dextrose 50% Injectable 12.5 Gram(s) IV Push once  dextrose 50% Injectable 25 Gram(s) IV Push once  enoxaparin Injectable 40 milliGRAM(s) SubCutaneous daily  gabapentin 300 milliGRAM(s) Oral three times a day  glucagon  Injectable 1 milliGRAM(s) IntraMuscular once  glucagon  Injectable 1 milliGRAM(s) IntraMuscular once  influenza   Vaccine 0.5 milliLiter(s) IntraMuscular once  insulin glargine Injectable (LANTUS) 45 Unit(s) SubCutaneous two times a day  insulin lispro (ADMELOG) corrective regimen sliding scale   SubCutaneous three times a day before meals  insulin lispro (ADMELOG) corrective regimen sliding scale   SubCutaneous at bedtime  insulin lispro Injectable (ADMELOG) 17 Unit(s) SubCutaneous three times a day before meals  metoprolol succinate ER 50 milliGRAM(s) Oral daily  oxybutynin 5 milliGRAM(s) Oral at bedtime  valsartan 320 milliGRAM(s) Oral daily    MEDICATIONS  (PRN):  acetaminophen   Tablet .. 650 milliGRAM(s) Oral every 6 hours PRN Temp greater or equal to 38C (100.4F), Mild Pain (1 - 3)  aluminum hydroxide/magnesium hydroxide/simethicone Suspension 30 milliLiter(s) Oral every 4 hours PRN Dyspepsia  cyclobenzaprine 5 milliGRAM(s) Oral three times a day PRN Muscle Spasm  melatonin 3 milliGRAM(s) Oral at bedtime PRN Insomnia  ondansetron Injectable 4 milliGRAM(s) IV Push every 8 hours PRN Nausea and/or Vomiting  oxyCODONE    IR 2.5 milliGRAM(s) Oral every 6 hours PRN Severe Pain (7 - 10)  traMADol 25 milliGRAM(s) Oral every 6 hours PRN Moderate Pain (4 - 6)      CAPILLARY BLOOD GLUCOSE      POCT Blood Glucose.: 106 mg/dL (05 Oct 2021 23:08)  POCT Blood Glucose.: 79 mg/dL (05 Oct 2021 22:32)  POCT Blood Glucose.: 160 mg/dL (05 Oct 2021 17:40)  POCT Blood Glucose.: 196 mg/dL (05 Oct 2021 12:12)  POCT Blood Glucose.: 172 mg/dL (05 Oct 2021 08:50)    I&O's Summary      PHYSICAL EXAM:  Vital Signs Last 24 Hrs  T(C): 36.9 (06 Oct 2021 05:43), Max: 37.8 (05 Oct 2021 20:46)  T(F): 98.5 (06 Oct 2021 05:43), Max: 100 (05 Oct 2021 20:46)  HR: 92 (06 Oct 2021 05:43) (85 - 103)  BP: 160/94 (06 Oct 2021 05:43) (138/85 - 160/94)  BP(mean): --  RR: 17 (06 Oct 2021 05:43) (17 - 18)  SpO2: 98% (06 Oct 2021 05:43) (97% - 98%)    CONSTITUTIONAL: NAD, well-developed  HEENT: NC/AT, EOMI  RESPIRATORY: No increased work of breathing, CTAB, no wheezes or crackles appreciated  CARDIOVASCULAR: RRR, S1 and S2 present, no m/r/g  ABDOMEN: soft, NT, ND, bowel sounds present  EXTREMITIES: No LE edema  MUSCULOSKELETAL: no joint swelling or tenderness to palpation  NEURO: A&Ox3, moving all extremities    LABS:                        11.1   11.72 )-----------( 375      ( 05 Oct 2021 07:45 )             32.6     10-05    131<L>  |  96<L>  |  15  ----------------------------<  131<H>  5.2   |  22  |  1.12    Ca    9.2      05 Oct 2021 07:42  Phos  4.4     10-05  Mg     2.20     10-05                  RADIOLOGY & ADDITIONAL TESTS:    Results Reviewed:   Imaging Personally Reviewed:  Electrocardiogram Personally Reviewed:    COORDINATION OF CARE:  Care Discussed with Consultants/Other Providers [Y/N]:  Prior or Outpatient Records Reviewed [Y/N]:   PROGRESS NOTE:   Authored By: Kvng Cardoso MD     PGY-1, Internal Medicine  Pager: [phone], BMO [id number]    Patient is a 66y old  Male who presents with a chief complaint of Foot abscess and Uncontrolled DM2 (05 Oct 2021 08:38)      OVERNIGHT EVENTS: No acute events overnight    SUBJECTIVE: Pt seen and examined at bedside this morning. Says he slept well and is feeling less pain     ADDITIONAL REVIEW OF SYSTEMS:    MEDICATIONS  (STANDING):  amLODIPine   Tablet 5 milliGRAM(s) Oral daily  ampicillin/sulbactam  IVPB 3 Gram(s) IV Intermittent every 6 hours  collagenase Ointment 1 Application(s) Topical daily  dextrose 40% Gel 15 Gram(s) Oral once  dextrose 5%. 1000 milliLiter(s) (50 mL/Hr) IV Continuous <Continuous>  dextrose 5%. 1000 milliLiter(s) (100 mL/Hr) IV Continuous <Continuous>  dextrose 50% Injectable 25 Gram(s) IV Push once  dextrose 50% Injectable 12.5 Gram(s) IV Push once  dextrose 50% Injectable 25 Gram(s) IV Push once  enoxaparin Injectable 40 milliGRAM(s) SubCutaneous daily  gabapentin 300 milliGRAM(s) Oral three times a day  glucagon  Injectable 1 milliGRAM(s) IntraMuscular once  glucagon  Injectable 1 milliGRAM(s) IntraMuscular once  influenza   Vaccine 0.5 milliLiter(s) IntraMuscular once  insulin glargine Injectable (LANTUS) 45 Unit(s) SubCutaneous two times a day  insulin lispro (ADMELOG) corrective regimen sliding scale   SubCutaneous three times a day before meals  insulin lispro (ADMELOG) corrective regimen sliding scale   SubCutaneous at bedtime  insulin lispro Injectable (ADMELOG) 17 Unit(s) SubCutaneous three times a day before meals  metoprolol succinate ER 50 milliGRAM(s) Oral daily  oxybutynin 5 milliGRAM(s) Oral at bedtime  valsartan 320 milliGRAM(s) Oral daily    MEDICATIONS  (PRN):  acetaminophen   Tablet .. 650 milliGRAM(s) Oral every 6 hours PRN Temp greater or equal to 38C (100.4F), Mild Pain (1 - 3)  aluminum hydroxide/magnesium hydroxide/simethicone Suspension 30 milliLiter(s) Oral every 4 hours PRN Dyspepsia  cyclobenzaprine 5 milliGRAM(s) Oral three times a day PRN Muscle Spasm  melatonin 3 milliGRAM(s) Oral at bedtime PRN Insomnia  ondansetron Injectable 4 milliGRAM(s) IV Push every 8 hours PRN Nausea and/or Vomiting  oxyCODONE    IR 2.5 milliGRAM(s) Oral every 6 hours PRN Severe Pain (7 - 10)  traMADol 25 milliGRAM(s) Oral every 6 hours PRN Moderate Pain (4 - 6)      CAPILLARY BLOOD GLUCOSE      POCT Blood Glucose.: 106 mg/dL (05 Oct 2021 23:08)  POCT Blood Glucose.: 79 mg/dL (05 Oct 2021 22:32)  POCT Blood Glucose.: 160 mg/dL (05 Oct 2021 17:40)  POCT Blood Glucose.: 196 mg/dL (05 Oct 2021 12:12)  POCT Blood Glucose.: 172 mg/dL (05 Oct 2021 08:50)    I&O's Summary      PHYSICAL EXAM:  Vital Signs Last 24 Hrs  T(C): 36.9 (06 Oct 2021 05:43), Max: 37.8 (05 Oct 2021 20:46)  T(F): 98.5 (06 Oct 2021 05:43), Max: 100 (05 Oct 2021 20:46)  HR: 92 (06 Oct 2021 05:43) (85 - 103)  BP: 160/94 (06 Oct 2021 05:43) (138/85 - 160/94)  BP(mean): --  RR: 17 (06 Oct 2021 05:43) (17 - 18)  SpO2: 98% (06 Oct 2021 05:43) (97% - 98%)    CONSTITUTIONAL: NAD, well-developed  HEENT: NC/AT, EOMI  RESPIRATORY: No increased work of breathing, CTAB, no wheezes or crackles appreciated  CARDIOVASCULAR: RRR, S1 and S2 present, no m/r/g  ABDOMEN: soft, NT, distended (at baseline), bowel sounds present  EXTREMITIES: left ankle is swollen and tender to the touch and erythematous   MUSCULOSKELETAL: no joint swelling or tenderness to palpation  NEURO: A&Ox3, moving all extremities    LABS:                        11.1   11.72 )-----------( 375      ( 05 Oct 2021 07:45 )             32.6     10-05    131<L>  |  96<L>  |  15  ----------------------------<  131<H>  5.2   |  22  |  1.12    Ca    9.2      05 Oct 2021 07:42  Phos  4.4     10-05  Mg     2.20     10-05                  RADIOLOGY & ADDITIONAL TESTS:    Results Reviewed:   Imaging Personally Reviewed:  Electrocardiogram Personally Reviewed:    COORDINATION OF CARE:  Care Discussed with Consultants/Other Providers [Y/N]:  Prior or Outpatient Records Reviewed [Y/N]:

## 2021-10-06 NOTE — PROGRESS NOTE ADULT - PROBLEM SELECTOR PLAN 2
Pt has poorly controlled DM2 with hyperglycemia to the 400s, A1C of 12%, sodium of 125 (135 corrected Rodrigo formula), and K of 5.4. His basal insulin is 55 BID, with 20u premeal 2 times daily    -pt is will receive NPH 20u now  -pt will get 40 basal tonight at bedtime  -pt will have regular diet and receive 20u of premeal insulin   -goal is to keep pt within 80-90% of his total at home insulin   -endocrine consult for outpatient f/u for better DM2 control   -cont at home gabapentin 300mg TID for diabetes neuropathic pain   -FS Q6  -BMP Q12 to normalize Na and K
Pt has poorly controlled DM2 with hyperglycemia to the 400s, A1C of 12%, sodium of 125 (135 corrected Rodrigo formula), and K of 5.4. His basal insulin is 55 BID, with 20u premeal 2 times daily  -Lantus 45U BID with premeal 17U TID  -goal is to keep pt within 80-90% of his total at home insulin   -endocrine consult for outpatient f/u for better DM2 control   -cont at home gabapentin 300mg TID for diabetes neuropathic pain
Pt has poorly controlled DM2 with hyperglycemia to the 400s, A1C of 12%, sodium of 125 (135 corrected Rodrigo formula), and K of 5.4. His basal insulin is 55 BID, with 20u premeal 2 times daily    -pt is will receive NPH 20u now  -pt will get 40 basal tonight at bedtime  -pt will have regular diet and receive 20u of premeal insulin   -goal is to keep pt within 80-90% of his total at home insulin   -endocrine consult for outpatient f/u for better DM2 control   -cont at home gabapentin 300mg TID for diabetes neuropathic pain   -FS Q6  -BMP Q12 to normalize Na and K
Pt has poorly controlled DM2 with hyperglycemia to the 400s, A1C of 12%, sodium of 125 (135 corrected Rodrigo formula), and K of 5.4. His basal insulin is 55 BID, with 20u premeal 2 times daily  -Lantus 45U BID with premeal 17U TID  -goal is to keep pt within 80-90% of his total at home insulin   -endocrine consult for outpatient f/u for better DM2 control   -cont at home gabapentin 300mg TID for diabetes neuropathic pain

## 2021-10-06 NOTE — PROGRESS NOTE ADULT - PROBLEM SELECTOR PROBLEM 1
Abscess of plantar aspect of foot

## 2021-10-07 LAB
CULTURE RESULTS: SIGNIFICANT CHANGE UP
SPECIMEN SOURCE: SIGNIFICANT CHANGE UP

## 2021-10-15 ENCOUNTER — APPOINTMENT (OUTPATIENT)
Dept: ENDOCRINOLOGY | Facility: CLINIC | Age: 66
End: 2021-10-15
Payer: MEDICAID

## 2021-10-15 VITALS
HEART RATE: 91 BPM | HEIGHT: 62.5 IN | TEMPERATURE: 98.1 F | SYSTOLIC BLOOD PRESSURE: 112 MMHG | OXYGEN SATURATION: 98 % | DIASTOLIC BLOOD PRESSURE: 82 MMHG | WEIGHT: 162 LBS | BODY MASS INDEX: 29.07 KG/M2

## 2021-10-15 DIAGNOSIS — E11.21 TYPE 2 DIABETES MELLITUS WITH DIABETIC NEPHROPATHY: ICD-10-CM

## 2021-10-15 PROCEDURE — 99214 OFFICE O/P EST MOD 30 MIN: CPT

## 2021-10-15 RX ORDER — FLASH GLUCOSE SENSOR
KIT MISCELLANEOUS
Qty: 2 | Refills: 3 | Status: DISCONTINUED | COMMUNITY
Start: 2020-02-10 | End: 2021-10-15

## 2021-10-15 RX ORDER — FLASH GLUCOSE SCANNING READER
EACH MISCELLANEOUS
Qty: 1 | Refills: 0 | Status: DISCONTINUED | COMMUNITY
Start: 2020-02-10 | End: 2021-10-15

## 2021-10-15 RX ORDER — VALSARTAN 40 MG/1
TABLET, COATED ORAL
Refills: 0 | Status: DISCONTINUED | COMMUNITY
End: 2021-10-15

## 2021-10-15 RX ORDER — ROSUVASTATIN CALCIUM 40 MG/1
40 TABLET, FILM COATED ORAL
Qty: 30 | Refills: 5 | Status: DISCONTINUED | COMMUNITY
Start: 2020-02-10 | End: 2021-10-15

## 2021-10-15 RX ORDER — AMLODIPINE BESYLATE 5 MG/1
TABLET ORAL
Refills: 0 | Status: ACTIVE | COMMUNITY

## 2021-10-15 RX ORDER — FLASH GLUCOSE SENSOR
KIT MISCELLANEOUS
Qty: 2 | Refills: 5 | Status: DISCONTINUED | COMMUNITY
Start: 2020-09-21 | End: 2021-10-15

## 2021-10-15 RX ORDER — INSULIN LISPRO 100 U/ML
100 INJECTION, SOLUTION INTRAVENOUS; SUBCUTANEOUS
Refills: 0 | Status: DISCONTINUED | COMMUNITY
End: 2021-10-15

## 2021-10-15 RX ORDER — OXYBUTYNIN CHLORIDE 5 MG/1
5 TABLET ORAL
Refills: 0 | Status: ACTIVE | COMMUNITY

## 2021-10-15 RX ORDER — ATORVASTATIN CALCIUM 40 MG/1
40 TABLET, FILM COATED ORAL
Refills: 0 | Status: ACTIVE | COMMUNITY

## 2021-10-15 RX ORDER — INSULIN GLARGINE 100 [IU]/ML
INJECTION, SOLUTION SUBCUTANEOUS
Refills: 0 | Status: DISCONTINUED | COMMUNITY
End: 2021-10-15

## 2021-10-15 RX ORDER — VALSARTAN 320 MG/1
320 TABLET, COATED ORAL
Refills: 0 | Status: ACTIVE | COMMUNITY

## 2021-10-15 RX ORDER — FLASH GLUCOSE SCANNING READER
EACH MISCELLANEOUS
Qty: 1 | Refills: 0 | Status: DISCONTINUED | COMMUNITY
Start: 2020-09-22 | End: 2021-10-15

## 2021-10-15 RX ORDER — GABAPENTIN 300 MG
300 TABLET ORAL 3 TIMES DAILY
Refills: 0 | Status: ACTIVE | COMMUNITY

## 2021-10-15 NOTE — PHYSICAL EXAM
[Alert] : alert [Well Nourished] : well nourished [No Acute Distress] : no acute distress [Well Developed] : well developed [Normal Sclera/Conjunctiva] : normal sclera/conjunctiva [EOMI] : extra ocular movement intact [No Proptosis] : no proptosis [Normal Oropharynx] : the oropharynx was normal [Thyroid Not Enlarged] : the thyroid was not enlarged [No Thyroid Nodules] : no palpable thyroid nodules [No Respiratory Distress] : no respiratory distress [No Accessory Muscle Use] : no accessory muscle use [Clear to Auscultation] : lungs were clear to auscultation bilaterally [Normal S1, S2] : normal S1 and S2 [Normal Rate] : heart rate was normal [Regular Rhythm] : with a regular rhythm [No Edema] : no peripheral edema [Pedal Pulses Normal] : the pedal pulses are present [Normal Bowel Sounds] : normal bowel sounds [Not Tender] : non-tender [Not Distended] : not distended [Soft] : abdomen soft [Normal Anterior Cervical Nodes] : no anterior cervical lymphadenopathy [No Spinal Tenderness] : no spinal tenderness [Spine Straight] : spine straight [Normal Gait] : normal gait [No Stigmata of Cushings Syndrome] : no stigmata of Cushings Syndrome [Normal Strength/Tone] : muscle strength and tone were normal [No Rash] : no rash [Normal Reflexes] : deep tendon reflexes were 2+ and symmetric [No Tremors] : no tremors [Oriented x3] : oriented to person, place, and time [Acanthosis Nigricans] : no acanthosis nigricans

## 2021-10-15 NOTE — QUALITY MEASURES
[Visual inspection, sensory exam] : Foot exam, including visual inspection, sensory exam with mono filament, and pulse exam, was performed within the last 12 months [NoLowerExtremityNeuroExam] : Foot with recent ulcer in bandages, follow up with podiatry on a regular basis

## 2021-10-15 NOTE — DATA REVIEWED
[FreeTextEntry1] : 10- 07:18 Order Number  [id number]   (FROM [facility])\par \par  Sodium, Serum 131  Low mmol/L  135 - 145 Final      \par  Potassium, Serum 4.7    mmol/L  3.5 - 5.3 Final      \par  Chloride, Serum 97  Low mmol/L  98 - 107 Final      \par  Carbon Dioxide, Serum 22    mmol/L  22 - 31 Final      \par  Anion Gap, Serum 12    mmol/L  7 - 14 Final      \par  Blood Urea Nitrogen, Serum 17    mg/dL  7 - 23 Final      \par  Creatinine, Serum 1.07    mg/dL  0.50 - 1.30 Final      \par  Glucose, Serum 143  High mg/dL  70 - 99 Final      \par  Calcium, Total Serum 9.2    mg/dL  8.4 - 10.5 Final      \par  eGFR if Non African American 73    mL/min/1.73M2  See_Comment Final The units for eGFR are ml/min/1.73m2 (normalized body \par  Protein Total, Serum 7.5    g/dL  6.0 - 8.3 Final      \par  Albumin, Serum 3.1  Low g/dL  3.3 - 5.0 Final      \par  Bilirubin Total, Serum 0.3    mg/dL  0.2 - 1.2 Final      \par  Alkaline Phosphatase, Serum 156  High U/L  40 - 120 Final      \par  Aspartate Aminotransferase (AST/SGOT) 33    U/L  4 - 40 Final      \par  Alanine Aminotransferase (ALT/SGPT) 35    U/L  4 - 41 Final \par  \par \par Hemoglobin A1C, Whole Blood 12.1  High %  4.0 - 5.6 Final High Risk (prediabetic) 5.7 - 6.4 %\par

## 2021-10-15 NOTE — HISTORY OF PRESENT ILLNESS
[FreeTextEntry1] : ABI MAC is a 64 year old male with PMHx of HTN, HLD, TYPE 2 DM presents for FU evaluation of Type 2 diabetes mellitus.  The patient was initially diagnosed with Type 2 diabetes mellitus 10 plus years ago. \par \par His initial DM regimen was: Janumet  mg bid, Actos 45 mg daily \par \par Current med:\par Semglee 55 units twice daily\par Admleog 20 units tid with meals. \par Metfromin 500mg twice daily.  \par \par 24 hour food recall:\par Breakfast: egg and bread (2 slices)\par Lunch: 2  bread, sometimes 3 bread, with vegetables \par Afternoon snack: fruits, small amount of papaya, sometimes strawberry, sometimes grapes, sometimes oranges. \par Dinner: chaptai, 2-3/ \par \par He only checks glucose once daily.  Usually in the morning, high in 200s.  \par \par His insurance does not cover the Natali sensor, family wants to know if they can purchase out of pocket since he does not monitor glucose.  \par \par He states that he follows with [facility]. \par He reports no cardiovascular risk factors, denies any history of CAD, CHF or CVA.  He does n't see a cardiologist.  \par \par Current diabetes symptoms/problems include: \par Patient reports adherence to prescribed medical regimen. \par Patient reports adherence to prescribed physical activity and dietary. \par \par Regarding hypertension: currently taking Valsartan 320mg once daily, Amlodipine 5mg daily, and Metoprolol 50mg \par \par Regarding hyperlipidemia, currently taking pravastatin 20mg daily, does not have any side effects. \par

## 2021-10-15 NOTE — ASSESSMENT
[FreeTextEntry1] : Mr. ABI MAC is a 64 year old M here for evaluation and treatment of diabetes mellitus, Type 2.\par Last seen in September 2020.\par \par 1.  Type 2 diabetes mellitus, poorly controlled\par A1c today:\par Agreeable to paying for Natali sensor out of pocket, will send to [facility] as it might be cheaper. \par States he knows how to use natali sensor.  \par Basaglar 55 units twice daily\par Admelog 16 units 3 times daily with meals\par Metformin ER 1000 mg twice daily\par Recommend to start GLP1RA with Trulicity 0.75mg once weekly, increase to 1.5mg base on glucose reading and tolerability \par Given foot ulcer, not a good candidate for SLGT 2 inhibitor at this point. \par \par 2.  Hypertension\par Blood pressure today: 112/82\par Renal status:  urine microalbumin elevated, referral was given to nephrology.  \par On Valsartan 320mg daily. \par Amlodipine 5mg daily \par Metoprolol 50mg daily \par \par 3.  Hyperlipidemia\par  in July 2020\par Rosuvastatin 40 mg once daily\par Recommend to repeat fasting lipid profile\par \par Diabetes Health Care Maintenance\par - Opthalmology up to date: Yes\par -Podiatry up to date: Yes, following up every few weeks. \par \par \par EDUCATION: Reviewed 'ABC' of diabetes management (respective goals in parentheses): A1C (<7), blood pressure (<140/90), and cholesterol (LDL <100).\par \par \par Given overall poor control, I recommend close follow up with CDE in 1 month.  Also to ensure he knows how to use Natali sensor. \par FOLLOW UP: I recommend that frequency of visits for diabetes care be every 3 month \par \par

## 2021-10-18 ENCOUNTER — TRANSCRIPTION ENCOUNTER (OUTPATIENT)
Age: 66
End: 2021-10-18

## 2021-10-18 RX ORDER — INSULIN GLARGINE-YFGN 100 [IU]/ML
100 INJECTION, SOLUTION SUBCUTANEOUS
Qty: 45 | Refills: 5 | Status: ACTIVE | COMMUNITY
Start: 1900-01-01 | End: 1900-01-01

## 2021-10-29 ENCOUNTER — APPOINTMENT (OUTPATIENT)
Dept: MRI IMAGING | Facility: CLINIC | Age: 66
End: 2021-10-29

## 2021-11-23 ENCOUNTER — APPOINTMENT (OUTPATIENT)
Dept: DERMATOLOGY | Facility: CLINIC | Age: 66
End: 2021-11-23
Payer: MEDICAID

## 2021-11-23 ENCOUNTER — LABORATORY RESULT (OUTPATIENT)
Age: 66
End: 2021-11-23

## 2021-11-23 VITALS — HEIGHT: 63 IN | WEIGHT: 163 LBS | BODY MASS INDEX: 28.88 KG/M2

## 2021-11-23 DIAGNOSIS — L82.1 OTHER SEBORRHEIC KERATOSIS: ICD-10-CM

## 2021-11-23 DIAGNOSIS — L85.3 XEROSIS CUTIS: ICD-10-CM

## 2021-11-23 DIAGNOSIS — D22.9 MELANOCYTIC NEVI, UNSPECIFIED: ICD-10-CM

## 2021-11-23 DIAGNOSIS — D48.5 NEOPLASM OF UNCERTAIN BEHAVIOR OF SKIN: ICD-10-CM

## 2021-11-23 DIAGNOSIS — R21 RASH AND OTHER NONSPECIFIC SKIN ERUPTION: ICD-10-CM

## 2021-11-23 PROCEDURE — 99203 OFFICE O/P NEW LOW 30 MIN: CPT | Mod: 25

## 2021-11-23 PROCEDURE — 11105 PUNCH BX SKIN EA SEP/ADDL: CPT

## 2021-11-23 PROCEDURE — 11104 PUNCH BX SKIN SINGLE LESION: CPT

## 2021-11-23 RX ORDER — CLOBETASOL PROPIONATE 0.5 MG/ML
0.05 SOLUTION TOPICAL
Qty: 1 | Refills: 2 | Status: ACTIVE | COMMUNITY
Start: 2021-11-23 | End: 1900-01-01

## 2021-11-23 RX ORDER — CLOBETASOL PROPIONATE 0.5 MG/G
0.05 OINTMENT TOPICAL TWICE DAILY
Qty: 1 | Refills: 1 | Status: ACTIVE | COMMUNITY
Start: 2021-11-23 | End: 1900-01-01

## 2021-11-27 PROBLEM — D22.9 MULTIPLE BENIGN NEVI: Status: ACTIVE | Noted: 2021-11-27

## 2021-11-27 PROBLEM — L82.1 SEBORRHEIC KERATOSES: Status: ACTIVE | Noted: 2021-11-27

## 2021-11-27 PROBLEM — R21 BLISTERING ERUPTION: Status: ACTIVE | Noted: 2021-11-23

## 2021-11-27 PROBLEM — L85.3 XEROSIS CUTIS: Status: ACTIVE | Noted: 2021-11-27

## 2021-11-27 PROBLEM — D48.5 NEOPLASM OF UNCERTAIN BEHAVIOR OF SKIN: Status: ACTIVE | Noted: 2021-11-27

## 2021-11-30 ENCOUNTER — NON-APPOINTMENT (OUTPATIENT)
Age: 66
End: 2021-11-30

## 2021-11-30 NOTE — PROGRESS NOTE ADULT - PROBLEM SELECTOR PLAN 4
Post-Care Instructions: I reviewed with the patient in detail post-care instructions. Patient is to wear sunprotection, and avoid picking at any of the treated lesions. Pt may apply Vaseline to crusted or scabbing areas. Pt with initial AG of 25 in the setting of DKA and lactic acidosis  -current AG is 14  -continue to monitor VBG for closure of the gap Detail Level: Simple Show Applicator Variable?: Yes Aperture Size (Optional): C Consent: The patient's consent was obtained including but not limited to risks of crusting, scabbing, blistering, scarring, darker or lighter pigmentary change, recurrence, incomplete removal and infection. Duration Of Freeze Thaw-Cycle (Seconds): 10 Number Of Freeze-Thaw Cycles: 1 freeze-thaw cycle

## 2021-12-02 ENCOUNTER — APPOINTMENT (OUTPATIENT)
Dept: ULTRASOUND IMAGING | Facility: CLINIC | Age: 66
End: 2021-12-02
Payer: MEDICAID

## 2021-12-02 ENCOUNTER — APPOINTMENT (OUTPATIENT)
Dept: CARDIOLOGY | Facility: CLINIC | Age: 66
End: 2021-12-02
Payer: MEDICAID

## 2021-12-02 ENCOUNTER — NON-APPOINTMENT (OUTPATIENT)
Age: 66
End: 2021-12-02

## 2021-12-02 VITALS
HEIGHT: 63 IN | RESPIRATION RATE: 16 BRPM | SYSTOLIC BLOOD PRESSURE: 128 MMHG | OXYGEN SATURATION: 98 % | BODY MASS INDEX: 31.89 KG/M2 | DIASTOLIC BLOOD PRESSURE: 86 MMHG | WEIGHT: 180 LBS | HEART RATE: 101 BPM | TEMPERATURE: 97.8 F

## 2021-12-02 PROCEDURE — 99204 OFFICE O/P NEW MOD 45 MIN: CPT

## 2021-12-02 PROCEDURE — 93970 EXTREMITY STUDY: CPT

## 2021-12-02 NOTE — ASSESSMENT
[FreeTextEntry1] : Assessment:\par 1.  L leg edema post trauma\par \par \par plan:\par 1.  venous duplex to assure no DVT now to assure no DVT given recent trauma and now continued edema and pain \par 2.  Compression garments, 30-40 mmHg\par 3.  Leg elevation, calf pump exercises\par 4.  Strong pedal pulses on exam. \par 5.  await duplex\par 6.  return in 6 weeks

## 2021-12-02 NOTE — PHYSICAL EXAM
[General Appearance - Well Developed] : well developed [Normal Appearance] : normal appearance [Well Groomed] : well groomed [General Appearance - Well Nourished] : well nourished [No Deformities] : no deformities [General Appearance - In No Acute Distress] : no acute distress [Normal Oral Mucosa] : normal oral mucosa [No Oral Pallor] : no oral pallor [No Oral Cyanosis] : no oral cyanosis [Normal Jugular Venous A Waves Present] : normal jugular venous A waves present [Normal Jugular Venous V Waves Present] : normal jugular venous V waves present [No Jugular Venous Pulido A Waves] : no jugular venous pulido A waves [Heart Rate And Rhythm] : heart rate and rhythm were normal [Heart Sounds] : normal S1 and S2 [Murmurs] : no murmurs present [Respiration, Rhythm And Depth] : normal respiratory rhythm and effort [Exaggerated Use Of Accessory Muscles For Inspiration] : no accessory muscle use [Auscultation Breath Sounds / Voice Sounds] : lungs were clear to auscultation bilaterally [Abdomen Soft] : soft [Abdomen Tenderness] : non-tender [] : no hepato-splenomegaly [Abdomen Mass (___ Cm)] : no abdominal mass palpated [Abnormal Walk] : normal gait [Gait - Sufficient For Exercise Testing] : the gait was sufficient for exercise testing [FreeTextEntry1] : strong pedal pulses.  There is no edema on the right.  There is edema pitting on the left.  Shin and ankle and foot.

## 2021-12-02 NOTE — REASON FOR VISIT
[Initial Evaluation] : an initial evaluation of [FreeTextEntry2] : Leg swelling [FreeTextEntry1] : Seen by dermatology:  lesions on the scalp, back, arms, dry skin.  Biopsy taken.  \par Follows with endocrine:  HTN, HLD, type 2 diabetes.  \par Valsartan, Amlodipine, metoprolol\par Pravastatin.\par \par Follows with pulmonary:  cough.  PFTs.  \par \par CT a/p - horse shoe kidney.  Vessels:  within normal limits\par \par Admitted Sept 30-Oct 6 - for foot abcess, fell off ladder and stubbed his toe.  Podiatrist Donell Thao.  Antibiotics.  Vanco and zosyn.  No OM.  DVT negatvie.   L leg and foot started swelling after fall.  Prior to fall , no swelling.\par \par He is a .  \par \par He has now healed \par There is no more ulceration anymore.  The plantar side of the 1st digit.\par \par No other vascular testing. \par \par Breathing is fine. \par \par He is not wearing compression \par \par \par No duplex done!!\par

## 2021-12-06 ENCOUNTER — NON-APPOINTMENT (OUTPATIENT)
Age: 66
End: 2021-12-06

## 2021-12-30 ENCOUNTER — APPOINTMENT (OUTPATIENT)
Dept: DERMATOLOGY | Facility: CLINIC | Age: 66
End: 2021-12-30
Payer: MEDICAID

## 2021-12-30 DIAGNOSIS — L28.1 PRURIGO NODULARIS: ICD-10-CM

## 2021-12-30 PROCEDURE — 99214 OFFICE O/P EST MOD 30 MIN: CPT | Mod: GC

## 2022-01-01 RX ORDER — TRIAMCINOLONE ACETONIDE 1 MG/G
0.1 OINTMENT TOPICAL
Qty: 1 | Refills: 1 | Status: ACTIVE | COMMUNITY
Start: 2022-01-01 | End: 1900-01-01

## 2022-01-01 NOTE — HISTORY OF PRESENT ILLNESS
[FreeTextEntry1] : F/U rash  [de-identified] : ABI MAC is a 66 year year old male w/ HTN, HLD, DM who presents for:\par \par Rash on the torso, legs , has been present for 5 years, very itchy, topical steroids helped a little. \par \par SH:

## 2022-01-01 NOTE — ASSESSMENT
[FreeTextEntry1] : # prurigo nodularis- mostly torso, back, chronic, severe, flaring\par education about chronic nature and course of condition, treatment options were discussed\par - Reviewed risks (as well as mitigation strategies for adverse drug events as applicable), benefits, and alternatives of therapy \par - a/w generalized pruritus although reason is unclear. advised to bring in recent labs done by outside provider to look for potential systemic causes\par - start Triamcinolone 0.01% ointment to affected area BID for 2 weeks, 1 week off, SED. pt was having trouble running with the dosage pack size, running out of smaller tubes of clobetasol \par - Start NBUVB TIW\par \par RTC 3-4 months

## 2022-01-10 ENCOUNTER — APPOINTMENT (OUTPATIENT)
Dept: ENDOCRINOLOGY | Facility: CLINIC | Age: 67
End: 2022-01-10

## 2022-01-20 ENCOUNTER — APPOINTMENT (OUTPATIENT)
Dept: CARDIOLOGY | Facility: CLINIC | Age: 67
End: 2022-01-20
Payer: MEDICAID

## 2022-01-20 VITALS
BODY MASS INDEX: 31.36 KG/M2 | DIASTOLIC BLOOD PRESSURE: 86 MMHG | OXYGEN SATURATION: 97 % | HEIGHT: 63 IN | WEIGHT: 177 LBS | SYSTOLIC BLOOD PRESSURE: 125 MMHG | HEART RATE: 94 BPM

## 2022-01-20 VITALS — TEMPERATURE: 97.8 F

## 2022-01-20 DIAGNOSIS — R60.0 LOCALIZED EDEMA: ICD-10-CM

## 2022-01-20 PROCEDURE — 99214 OFFICE O/P EST MOD 30 MIN: CPT

## 2022-01-20 NOTE — REASON FOR VISIT
[Follow-Up - Clinic] : a clinic follow-up of [FreeTextEntry2] : Leg swelling [FreeTextEntry1] : 1/20/2022\par Still has Left foot swelling and pain, but markedly improved.  \par His podiatrist is Dr. Donell Thao\par He was wearing compression, now not wearing\par Still has foot edema.\par Duplex negative for DVT. \par Currently not on antibiotic\par \par   \par \par Admitted Sept 30-Oct 6 - for foot abcess, fell off ladder and stubbed his toe.  Podiatrist Donell Thao.  Antibiotics.  Vanco and zosyn.  No OM.  DVT negatvie.   L leg and foot started swelling after fall.  Prior to fall , no swelling.\par

## 2022-01-20 NOTE — ASSESSMENT
[FreeTextEntry1] : Assessment:\par 1.  L leg edema post trauma\par \par \par plan:\par 1.  venous duplex was negative\par 2.  Compression garments, 30-40 mmHg\par 3.  Leg elevation, calf pump exercises\par 4.  Strong pedal pulses on exam. \par 5. He should see podiatry to assure no primary foot issue. \par 6.  I wrapped the leg with an elastic bandage and taught him how to do this.  He will purchase an ACE wrap and wrap from the toes to the mid shin daily (first thing in the morning and take off at bedtime)\par

## 2022-01-20 NOTE — PHYSICAL EXAM
[General Appearance - Well Developed] : well developed [Normal Appearance] : normal appearance [Well Groomed] : well groomed [General Appearance - Well Nourished] : well nourished [No Deformities] : no deformities [General Appearance - In No Acute Distress] : no acute distress [Normal Oral Mucosa] : normal oral mucosa [No Oral Pallor] : no oral pallor [No Oral Cyanosis] : no oral cyanosis [Normal Jugular Venous A Waves Present] : normal jugular venous A waves present [Normal Jugular Venous V Waves Present] : normal jugular venous V waves present [No Jugular Venous Pulido A Waves] : no jugular venous pulido A waves [Respiration, Rhythm And Depth] : normal respiratory rhythm and effort [Exaggerated Use Of Accessory Muscles For Inspiration] : no accessory muscle use [Auscultation Breath Sounds / Voice Sounds] : lungs were clear to auscultation bilaterally [Heart Rate And Rhythm] : heart rate and rhythm were normal [Heart Sounds] : normal S1 and S2 [Murmurs] : no murmurs present [Abdomen Soft] : soft [Abdomen Tenderness] : non-tender [] : no hepato-splenomegaly [Abdomen Mass (___ Cm)] : no abdominal mass palpated [Abnormal Walk] : normal gait [Gait - Sufficient For Exercise Testing] : the gait was sufficient for exercise testing [FreeTextEntry1] : strong pedal pulses.  There is no edema on the right.  There is edema pitting on the left.  Shin and ankle and foot.

## 2022-01-25 ENCOUNTER — APPOINTMENT (OUTPATIENT)
Dept: OPHTHALMOLOGY | Facility: CLINIC | Age: 67
End: 2022-01-25
Payer: MEDICAID

## 2022-01-25 ENCOUNTER — NON-APPOINTMENT (OUTPATIENT)
Age: 67
End: 2022-01-25

## 2022-01-25 PROCEDURE — 92014 COMPRE OPH EXAM EST PT 1/>: CPT

## 2022-01-25 PROCEDURE — 92250 FUNDUS PHOTOGRAPHY W/I&R: CPT

## 2022-01-25 PROCEDURE — 92020 GONIOSCOPY: CPT

## 2022-02-02 ENCOUNTER — APPOINTMENT (OUTPATIENT)
Dept: OPHTHALMOLOGY | Facility: CLINIC | Age: 67
End: 2022-02-02

## 2022-02-07 ENCOUNTER — APPOINTMENT (OUTPATIENT)
Dept: OPHTHALMOLOGY | Facility: CLINIC | Age: 67
End: 2022-02-07

## 2022-02-22 ENCOUNTER — NON-APPOINTMENT (OUTPATIENT)
Age: 67
End: 2022-02-22

## 2022-02-23 ENCOUNTER — APPOINTMENT (OUTPATIENT)
Dept: OPHTHALMOLOGY | Facility: EYE CENTER | Age: 67
End: 2022-02-23

## 2022-02-24 ENCOUNTER — APPOINTMENT (OUTPATIENT)
Dept: OPHTHALMOLOGY | Facility: CLINIC | Age: 67
End: 2022-02-24

## 2022-04-01 ENCOUNTER — RX RENEWAL (OUTPATIENT)
Age: 67
End: 2022-04-01

## 2022-04-04 ENCOUNTER — APPOINTMENT (OUTPATIENT)
Dept: ENDOCRINOLOGY | Facility: CLINIC | Age: 67
End: 2022-04-04
Payer: MEDICAID

## 2022-04-04 VITALS
WEIGHT: 181 LBS | DIASTOLIC BLOOD PRESSURE: 80 MMHG | SYSTOLIC BLOOD PRESSURE: 130 MMHG | HEART RATE: 97 BPM | BODY MASS INDEX: 32.07 KG/M2 | TEMPERATURE: 97.2 F | HEIGHT: 63 IN | OXYGEN SATURATION: 99 %

## 2022-04-04 DIAGNOSIS — I10 ESSENTIAL (PRIMARY) HYPERTENSION: ICD-10-CM

## 2022-04-04 DIAGNOSIS — E11.9 TYPE 2 DIABETES MELLITUS W/OUT COMPLICATIONS: ICD-10-CM

## 2022-04-04 DIAGNOSIS — E78.00 PURE HYPERCHOLESTEROLEMIA, UNSPECIFIED: ICD-10-CM

## 2022-04-04 PROCEDURE — 95251 CONT GLUC MNTR ANALYSIS I&R: CPT

## 2022-04-04 PROCEDURE — 99215 OFFICE O/P EST HI 40 MIN: CPT

## 2022-04-04 PROCEDURE — 95250 CONT GLUC MNTR PHYS/QHP EQP: CPT

## 2022-04-04 RX ORDER — INSULIN LISPRO 100 U/ML
100 INJECTION, SOLUTION SUBCUTANEOUS
Qty: 30 | Refills: 4 | Status: ACTIVE | COMMUNITY
Start: 2021-10-15 | End: 1900-01-01

## 2022-04-04 RX ORDER — METFORMIN ER 500 MG 500 MG/1
500 TABLET ORAL DAILY
Qty: 180 | Refills: 3 | Status: ACTIVE | COMMUNITY
Start: 2020-09-21 | End: 1900-01-01

## 2022-04-04 RX ORDER — PEN NEEDLE, DIABETIC 29 G X1/2"
32G X 4 MM NEEDLE, DISPOSABLE MISCELLANEOUS
Qty: 500 | Refills: 1 | Status: ACTIVE | COMMUNITY
Start: 2020-02-10 | End: 1900-01-01

## 2022-04-04 NOTE — ASSESSMENT
[FreeTextEntry1] : Mr. ABI MAC is a 66 year old M here for evaluation and treatment of diabetes mellitus, Type 2.\par \par 1.  Type 2 diabetes mellitus, uncontrolled\par A1c 9.6 in 2/2022\par Advised pt he needs to check glucoses fasting/premeal vs 2 hr post meal regardless of cataract surgery plans, not just to get clearance.\par Will inquire about freestyle lucero\par \par Limited glucose data, based on AM fasting hypos reported:\par Reduce Basaglar 55 -->44 units twice daily\par Admelog 20 units with meals (can increase to 24 units if pre-meal sugars remain above goal; if <100s with med changes below, will cut back to 16 units)\par Increase to Metformin ER 1000 mg twice daily (creatinine 0.9, egfr 90)\par Increase Trulicity 1.5 mg once weekly\par Given foot ulcer hx, not a good candidate for SLGT 2 inhibitor at this point. \par \par Reviewed titration instructions above\par Placed professional lucero in office today.\par Cannot recommend clearance for cataract surgery based on glucose data available. Can make a decision after lucero data collected. \par Spoke to pt's PCP, Dr. Boyd about the above plan.\par \par 2.  Hypertension\par Renal status:  urine microalbumin elevated, referral was given to nephrology at last visit\par On Valsartan 320mg daily. \par Amlodipine 5mg daily \par Metoprolol 50mg daily \par \par 3.  Hyperlipidemia\par LDL 46 in 2/2022\par Rosuvastatin 40 mg once daily\par \par Diabetes Health Care Maintenance\par - Opthalmology up to date: Yes\par -Podiatry up to date: Yes, following up every few weeks. \par \par EDUCATION: Reviewed 'ABC' of diabetes management (respective goals in parentheses): A1C (<7), blood pressure (<140/90), and cholesterol (LDL <100).\par \par FOLLOW UP: I recommend that frequency of visits for diabetes care be every 3 month \par \par

## 2022-04-04 NOTE — DATA REVIEWED
[FreeTextEntry1] : 10- 07:18 Order Number  [id number]   (FROM [facility])\par \par  Sodium, Serum 131  Low mmol/L  135 - 145 Final      \par  Potassium, Serum 4.7    mmol/L  3.5 - 5.3 Final      \par  Chloride, Serum 97  Low mmol/L  98 - 107 Final      \par  Carbon Dioxide, Serum 22    mmol/L  22 - 31 Final      \par  Anion Gap, Serum 12    mmol/L  7 - 14 Final      \par  Blood Urea Nitrogen, Serum 17    mg/dL  7 - 23 Final      \par  Creatinine, Serum 1.07    mg/dL  0.50 - 1.30 Final      \par  Glucose, Serum 143  High mg/dL  70 - 99 Final      \par  Calcium, Total Serum 9.2    mg/dL  8.4 - 10.5 Final      \par  eGFR if Non African American 73    mL/min/1.73M2  See_Comment Final The units for eGFR are ml/min/1.73m2 (normalized body \par  Protein Total, Serum 7.5    g/dL  6.0 - 8.3 Final      \par  Albumin, Serum 3.1  Low g/dL  3.3 - 5.0 Final      \par  Bilirubin Total, Serum 0.3    mg/dL  0.2 - 1.2 Final      \par  Alkaline Phosphatase, Serum 156  High U/L  40 - 120 Final      \par  Aspartate Aminotransferase (AST/SGOT) 33    U/L  4 - 40 Final      \par  Alanine Aminotransferase (ALT/SGPT) 35    U/L  4 - 41 Final \par  \par \par Hemoglobin A1C, Whole Blood 12.1  High %  4.0 - 5.6 Final High Risk (prediabetic) 5.7 - 6.4 %\par

## 2022-04-04 NOTE — HISTORY OF PRESENT ILLNESS
[FreeTextEntry1] : ABI MAC is a 66 year old male with PMHx of HTN, HLD, TYPE 2 DM presents for FU evaluation of Type 2 diabetes mellitus.  The patient was initially diagnosed with Type 2 diabetes mellitus 10 plus years ago. \par \par His initial DM regimen was: Janumet  mg bid, Actos 45 mg daily \par \par Interval hx:\par needs cataract surgery, checked glucose for two weeks when he was told but otherwise not checking consistently since then. \par Reports sugars have been in the 60s with symptoms in the mornings recently.\par \par Current med:\par Basaglar 55 units twice daily\par Admelog 20 units with meals (twice daily)\par Metformin  mg twice daily (creatinine 0.9, normal egfr)\par Trulicity 0.75mg once weekly\par \par 24 hour food recall:\par Breakfast: egg and bread (2 slices)\par Lunch: 2 [place]oc[place] bread, sometimes 3 bread, with vegetables \par Afternoon snack: fruits, small amount of papaya, sometimes strawberry, sometimes grapes, sometimes oranges. \par Dinner: chaptai, 2-3/ \par \par He only checks glucose twice daily\par brought log 2/24=3/10 only (see scan)\par Fasting : 90s-160s\par 1 hr after dinner: 107, 200, 348, 208, 286, 256, 122, 177\par not checking sugars the last two weeks\par reports sugars in 60s last few mornings\par Placed professional lucero in office today\par \par He states that he follows with [facility].\par He reports no cardiovascular risk factors, denies any history of CAD, CHF or CVA.  He does n't see a cardiologist.  \par \par Regarding hypertension: currently taking Valsartan 320mg once daily, Amlodipine 5mg daily, and Metoprolol 50mg \par \par Regarding hyperlipidemia, currently taking pravastatin 20mg daily, does not have any side effects. \par

## 2022-04-04 NOTE — PHYSICAL EXAM
[Alert] : alert [Well Nourished] : well nourished [No Acute Distress] : no acute distress [Well Developed] : well developed [Normal Sclera/Conjunctiva] : normal sclera/conjunctiva [EOMI] : extra ocular movement intact [No Proptosis] : no proptosis [Normal Oropharynx] : the oropharynx was normal [Thyroid Not Enlarged] : the thyroid was not enlarged [No Thyroid Nodules] : no palpable thyroid nodules [No Respiratory Distress] : no respiratory distress [No Accessory Muscle Use] : no accessory muscle use [Clear to Auscultation] : lungs were clear to auscultation bilaterally [Normal S1, S2] : normal S1 and S2 [Normal Rate] : heart rate was normal [Regular Rhythm] : with a regular rhythm [No Edema] : no peripheral edema [Pedal Pulses Normal] : the pedal pulses are present [Normal Bowel Sounds] : normal bowel sounds [Not Tender] : non-tender [Not Distended] : not distended [Soft] : abdomen soft [Normal Anterior Cervical Nodes] : no anterior cervical lymphadenopathy [No Spinal Tenderness] : no spinal tenderness [Spine Straight] : spine straight [No Stigmata of Cushings Syndrome] : no stigmata of Cushings Syndrome [Normal Gait] : normal gait [Normal Strength/Tone] : muscle strength and tone were normal [No Rash] : no rash [No Tremors] : no tremors [Oriented x3] : oriented to person, place, and time [Right foot was examined, including] : right foot ~C was examined, including visual inspection with sensory and pulse exams [Left foot was examined, including] : left foot ~C was examined, including visual inspection with sensory and pulse exams [Acanthosis Nigricans] : no acanthosis nigricans [Normal Affect] : the affect was normal [Normal Insight/Judgement] : insight and judgment were intact

## 2022-04-05 ENCOUNTER — RX RENEWAL (OUTPATIENT)
Age: 67
End: 2022-04-05

## 2022-04-19 ENCOUNTER — NON-APPOINTMENT (OUTPATIENT)
Age: 67
End: 2022-04-19

## 2022-04-22 ENCOUNTER — NON-APPOINTMENT (OUTPATIENT)
Age: 67
End: 2022-04-22

## 2022-04-25 ENCOUNTER — NON-APPOINTMENT (OUTPATIENT)
Age: 67
End: 2022-04-25

## 2022-05-18 NOTE — ED ADULT TRIAGE NOTE - PAIN RATING/NUMBER SCALE (0-10): ACTIVITY
[Post-hospitalization from ___ Hospital] : Post-hospitalization from [unfilled] Hospital [Discharge Summary] : discharge summary [Pertinent Labs] : pertinent labs [Discharge Med List] : discharge medication list [Patient Contacted By: ____] : and contacted by [unfilled] [FreeTextEntry2] : hypoglycemic shock \par insulin adjusted \par off 70/30  7.4 a1c \par toujeo in am \par weakness  9

## 2022-07-15 RX ORDER — INSULIN GLARGINE 100 [IU]/ML
100 INJECTION, SOLUTION SUBCUTANEOUS TWICE DAILY
Qty: 6 | Refills: 3 | Status: ACTIVE | COMMUNITY
Start: 2020-02-10 | End: 1900-01-01

## 2022-07-15 RX ORDER — DULAGLUTIDE 1.5 MG/.5ML
1.5 INJECTION, SOLUTION SUBCUTANEOUS
Qty: 12 | Refills: 1 | Status: ACTIVE | COMMUNITY
Start: 2020-02-10 | End: 1900-01-01

## 2023-02-06 NOTE — ED PROVIDER NOTE - INTERNATIONAL TRAVEL
Right knee pain since fall 2 weeks ago  Unremarkable exam and no acute findings on x-ray of the knee  Plan:  Continue pain regimen  Continue PT OT No

## 2023-03-20 ENCOUNTER — APPOINTMENT (OUTPATIENT)
Dept: ENDOCRINOLOGY | Facility: CLINIC | Age: 68
End: 2023-03-20

## 2023-05-13 NOTE — PHYSICAL THERAPY INITIAL EVALUATION ADULT - PERTINENT HX OF CURRENT PROBLEM, REHAB EVAL
66 year old male with history of DM type II presents to the ED with worsening foot abscess and possible soft tissue infection s/p falling off a few steps on a ladder and stubbing his toe English

## 2024-01-17 ENCOUNTER — APPOINTMENT (OUTPATIENT)
Dept: VASCULAR SURGERY | Facility: CLINIC | Age: 69
End: 2024-01-17
Payer: MEDICARE

## 2024-01-17 VITALS
WEIGHT: 183 LBS | BODY MASS INDEX: 32.43 KG/M2 | SYSTOLIC BLOOD PRESSURE: 127 MMHG | OXYGEN SATURATION: 98 % | HEART RATE: 86 BPM | DIASTOLIC BLOOD PRESSURE: 76 MMHG | TEMPERATURE: 97.6 F | HEIGHT: 63 IN

## 2024-01-17 DIAGNOSIS — M79.673 PAIN IN UNSPECIFIED FOOT: ICD-10-CM

## 2024-01-17 DIAGNOSIS — G60.8 OTHER HEREDITARY AND IDIOPATHIC NEUROPATHIES: ICD-10-CM

## 2024-01-17 PROCEDURE — 99203 OFFICE O/P NEW LOW 30 MIN: CPT

## 2024-01-17 PROCEDURE — 93922 UPR/L XTREMITY ART 2 LEVELS: CPT

## 2024-01-17 PROCEDURE — 93970 EXTREMITY STUDY: CPT

## 2024-01-17 RX ORDER — SEMAGLUTIDE 1.34 MG/ML
INJECTION, SOLUTION SUBCUTANEOUS
Refills: 0 | Status: ACTIVE | COMMUNITY

## 2024-01-17 RX ORDER — INSULIN ASPART 100 [IU]/ML
INJECTION, SOLUTION INTRAVENOUS; SUBCUTANEOUS
Refills: 0 | Status: ACTIVE | COMMUNITY

## 2024-01-23 PROBLEM — G60.8 PERIPHERAL SENSORY NEUROPATHY: Status: ACTIVE | Noted: 2024-01-23

## 2024-01-23 PROBLEM — M79.673 FOOT PAIN: Status: ACTIVE | Noted: 2024-01-23

## 2024-01-23 NOTE — ASSESSMENT
[FreeTextEntry1] : ABI MAC is a 68 year old male presents for evaluation.   > Bilateral feet pain - Reviewed studies with patient. No evidence of arterial insufficiency on physical exam with palpable pulses and no evidence of arterial insufficiency. Venous duplex w/ mild venous insufficiency isolated to bilateral saphenofemoral junctions only.  - Pain likely secondary to neuropathy. Follow up with neurology.

## 2024-01-23 NOTE — DATA REVIEWED
[FreeTextEntry1] : 1/17/2024 - BLE venous duplex Negative for DVT/SVT bilaterally. Reflux in SFJ bilaterally only.   1/17/2024 - JONO R 1.44, L 1.39

## 2024-01-23 NOTE — HISTORY OF PRESENT ILLNESS
[FreeTextEntry1] : ABI MAC is a 68 year old male who presents for evaluation of bilateral feet pain.   Patient states that he has had bilateral foot pain for over 2-3 years. He reports a continual bilateral tingling pain on the bottom and tops of his feet, worse at nighttime. He denies any leg swelling. Denies symptoms of claudication. Patent does have a history of diabetic neuropathy and is currently on gabapentin. He currently takes 300 mg BID, restarted three weeks ago.  Patient reports evaluation by neurology/pain management (Dr. Rasheed Garcia) and was diagnosed with neuropathy. He has chronic low back pain and was recommended for a spinal procedure for his feet pain but he did not want to pursue this treatment. He was evaluated one month ago. Reports left leg with more pain than the right.   + PMH: DM, HTN, HLD + PSH: Denies + FH: Denies + SH: Nonsmoker, limited etoh use, no illicit substance use  + Aller: NKDA  PCP is Dr. Antonio Barahona.

## 2024-01-23 NOTE — PHYSICAL EXAM
[2+] : left 2+ [Calm] : calm [Ankle Swelling (On Exam)] : not present [Varicose Veins Of Lower Extremities] : not present [de-identified] : Well-appearing  [de-identified] : EOMI, anicteric  [de-identified] : motor and sensation intact in all four extremities  [de-identified] : A&Ox4  [de-identified] : no wounds on bilateral feet

## 2024-06-04 ENCOUNTER — EMERGENCY (EMERGENCY)
Facility: HOSPITAL | Age: 69
LOS: 1 days | Discharge: HOME | End: 2024-06-04
Attending: STUDENT IN AN ORGANIZED HEALTH CARE EDUCATION/TRAINING PROGRAM | Admitting: STUDENT IN AN ORGANIZED HEALTH CARE EDUCATION/TRAINING PROGRAM
Payer: MEDICARE

## 2024-06-04 VITALS
HEART RATE: 85 BPM | HEIGHT: 63 IN | RESPIRATION RATE: 18 BRPM | SYSTOLIC BLOOD PRESSURE: 155 MMHG | WEIGHT: 176.37 LBS | OXYGEN SATURATION: 98 % | DIASTOLIC BLOOD PRESSURE: 60 MMHG | TEMPERATURE: 98 F

## 2024-06-04 VITALS
OXYGEN SATURATION: 100 % | SYSTOLIC BLOOD PRESSURE: 154 MMHG | RESPIRATION RATE: 16 BRPM | DIASTOLIC BLOOD PRESSURE: 89 MMHG | HEART RATE: 79 BPM

## 2024-06-04 LAB
ALBUMIN SERPL ELPH-MCNC: 4.1 G/DL — SIGNIFICANT CHANGE UP (ref 3.3–5)
ALP SERPL-CCNC: 128 U/L — HIGH (ref 40–120)
ALT FLD-CCNC: 23 U/L — SIGNIFICANT CHANGE UP (ref 4–41)
ANION GAP SERPL CALC-SCNC: 14 MMOL/L — SIGNIFICANT CHANGE UP (ref 7–14)
APPEARANCE UR: CLEAR — SIGNIFICANT CHANGE UP
AST SERPL-CCNC: 23 U/L — SIGNIFICANT CHANGE UP (ref 4–40)
BACTERIA # UR AUTO: NEGATIVE /HPF — SIGNIFICANT CHANGE UP
BASOPHILS # BLD AUTO: 0.08 K/UL — SIGNIFICANT CHANGE UP (ref 0–0.2)
BASOPHILS NFR BLD AUTO: 1.2 % — SIGNIFICANT CHANGE UP (ref 0–2)
BILIRUB SERPL-MCNC: 0.2 MG/DL — SIGNIFICANT CHANGE UP (ref 0.2–1.2)
BILIRUB UR-MCNC: NEGATIVE — SIGNIFICANT CHANGE UP
BUN SERPL-MCNC: 18 MG/DL — SIGNIFICANT CHANGE UP (ref 7–23)
CALCIUM SERPL-MCNC: 9.2 MG/DL — SIGNIFICANT CHANGE UP (ref 8.4–10.5)
CAST: 0 /LPF — SIGNIFICANT CHANGE UP (ref 0–4)
CHLORIDE SERPL-SCNC: 104 MMOL/L — SIGNIFICANT CHANGE UP (ref 98–107)
CO2 SERPL-SCNC: 20 MMOL/L — LOW (ref 22–31)
COLOR SPEC: YELLOW — SIGNIFICANT CHANGE UP
CREAT SERPL-MCNC: 1.39 MG/DL — HIGH (ref 0.5–1.3)
DIFF PNL FLD: NEGATIVE — SIGNIFICANT CHANGE UP
EGFR: 56 ML/MIN/1.73M2 — LOW
EOSINOPHIL # BLD AUTO: 0.16 K/UL — SIGNIFICANT CHANGE UP (ref 0–0.5)
EOSINOPHIL NFR BLD AUTO: 2.3 % — SIGNIFICANT CHANGE UP (ref 0–6)
GLUCOSE SERPL-MCNC: 130 MG/DL — HIGH (ref 70–99)
GLUCOSE UR QL: >=1000 MG/DL
HCT VFR BLD CALC: 38.1 % — LOW (ref 39–50)
HGB BLD-MCNC: 12.5 G/DL — LOW (ref 13–17)
HIV 1+2 AB+HIV1 P24 AG SERPL QL IA: SIGNIFICANT CHANGE UP
IANC: 4.63 K/UL — SIGNIFICANT CHANGE UP (ref 1.8–7.4)
IMM GRANULOCYTES NFR BLD AUTO: 0.3 % — SIGNIFICANT CHANGE UP (ref 0–0.9)
KETONES UR-MCNC: NEGATIVE MG/DL — SIGNIFICANT CHANGE UP
LEUKOCYTE ESTERASE UR-ACNC: NEGATIVE — SIGNIFICANT CHANGE UP
LIDOCAIN IGE QN: 58 U/L — SIGNIFICANT CHANGE UP (ref 7–60)
LYMPHOCYTES # BLD AUTO: 1.2 K/UL — SIGNIFICANT CHANGE UP (ref 1–3.3)
LYMPHOCYTES # BLD AUTO: 17.6 % — SIGNIFICANT CHANGE UP (ref 13–44)
MCHC RBC-ENTMCNC: 24.2 PG — LOW (ref 27–34)
MCHC RBC-ENTMCNC: 32.8 GM/DL — SIGNIFICANT CHANGE UP (ref 32–36)
MCV RBC AUTO: 73.7 FL — LOW (ref 80–100)
MONOCYTES # BLD AUTO: 0.74 K/UL — SIGNIFICANT CHANGE UP (ref 0–0.9)
MONOCYTES NFR BLD AUTO: 10.8 % — SIGNIFICANT CHANGE UP (ref 2–14)
NEUTROPHILS # BLD AUTO: 4.63 K/UL — SIGNIFICANT CHANGE UP (ref 1.8–7.4)
NEUTROPHILS NFR BLD AUTO: 67.8 % — SIGNIFICANT CHANGE UP (ref 43–77)
NITRITE UR-MCNC: NEGATIVE — SIGNIFICANT CHANGE UP
NRBC # BLD: 0 /100 WBCS — SIGNIFICANT CHANGE UP (ref 0–0)
NRBC # FLD: 0 K/UL — SIGNIFICANT CHANGE UP (ref 0–0)
PH UR: 5.5 — SIGNIFICANT CHANGE UP (ref 5–8)
PLATELET # BLD AUTO: 240 K/UL — SIGNIFICANT CHANGE UP (ref 150–400)
POTASSIUM SERPL-MCNC: 4.3 MMOL/L — SIGNIFICANT CHANGE UP (ref 3.5–5.3)
POTASSIUM SERPL-SCNC: 4.3 MMOL/L — SIGNIFICANT CHANGE UP (ref 3.5–5.3)
PROT SERPL-MCNC: 7.6 G/DL — SIGNIFICANT CHANGE UP (ref 6–8.3)
PROT UR-MCNC: 100 MG/DL
RBC # BLD: 5.17 M/UL — SIGNIFICANT CHANGE UP (ref 4.2–5.8)
RBC # FLD: 15.9 % — HIGH (ref 10.3–14.5)
RBC CASTS # UR COMP ASSIST: SIGNIFICANT CHANGE UP /HPF (ref 0–4)
REVIEW: SIGNIFICANT CHANGE UP
SODIUM SERPL-SCNC: 138 MMOL/L — SIGNIFICANT CHANGE UP (ref 135–145)
SP GR SPEC: 1.06 — HIGH (ref 1–1.03)
SQUAMOUS # UR AUTO: 0 /HPF — SIGNIFICANT CHANGE UP (ref 0–5)
UROBILINOGEN FLD QL: 0.2 MG/DL — SIGNIFICANT CHANGE UP (ref 0.2–1)
WBC # BLD: 6.83 K/UL — SIGNIFICANT CHANGE UP (ref 3.8–10.5)
WBC # FLD AUTO: 6.83 K/UL — SIGNIFICANT CHANGE UP (ref 3.8–10.5)
WBC UR QL: 0 /HPF — SIGNIFICANT CHANGE UP (ref 0–5)

## 2024-06-04 PROCEDURE — 74177 CT ABD & PELVIS W/CONTRAST: CPT | Mod: 26,MC

## 2024-06-04 PROCEDURE — 99285 EMERGENCY DEPT VISIT HI MDM: CPT

## 2024-06-04 RX ORDER — SODIUM CHLORIDE 9 MG/ML
1000 INJECTION INTRAMUSCULAR; INTRAVENOUS; SUBCUTANEOUS ONCE
Refills: 0 | Status: COMPLETED | OUTPATIENT
Start: 2024-06-04 | End: 2024-06-04

## 2024-06-04 RX ORDER — ACETAMINOPHEN 500 MG
1000 TABLET ORAL ONCE
Refills: 0 | Status: COMPLETED | OUTPATIENT
Start: 2024-06-04 | End: 2024-06-04

## 2024-06-04 RX ORDER — FAMOTIDINE 10 MG/ML
20 INJECTION INTRAVENOUS ONCE
Refills: 0 | Status: COMPLETED | OUTPATIENT
Start: 2024-06-04 | End: 2024-06-04

## 2024-06-04 RX ADMIN — SODIUM CHLORIDE 1000 MILLILITER(S): 9 INJECTION INTRAMUSCULAR; INTRAVENOUS; SUBCUTANEOUS at 11:44

## 2024-06-04 RX ADMIN — FAMOTIDINE 20 MILLIGRAM(S): 10 INJECTION INTRAVENOUS at 11:44

## 2024-06-04 RX ADMIN — Medication 400 MILLIGRAM(S): at 11:44

## 2024-06-04 NOTE — ED PROVIDER NOTE - PHYSICAL EXAMINATION
General: well appearing, interactive, well nourished, no apparent distress, ncat  HEENT: EOMI, PERRLA, normal mucosa, normal oropharynx, no lesions on the lips or on oral mucosa, normal external ear  Neck: supple, no lymphadenopathy, full range of motion, no nuchal rigidity  CV: RRR, normal S1 and S2 with no murmur, capillary refill less than two seconds  Resp: lungs CTA b/l, good aeration bilaterally, symmetric chest wall   Abd: non-distended, soft, mild r sided abd ttp  : no CVA tenderness  MSK: full range of motion, no cyanosis, no edema, no clubbing, no immobility  Neuro: CN II-XII grossly intact, muscle strength 5/5 in all extremities, normal gait  Skin: no rashes, skin intact

## 2024-06-04 NOTE — ED PROVIDER NOTE - CLINICAL SUMMARY MEDICAL DECISION MAKING FREE TEXT BOX
Colton Spear, DO: 67 yo m dm, pw r sided abd pain. Patient reports approximately 5 days of symptoms, right-sided, nondraining.  Worse with pressure.  Better with defecation.  Denies D/C, N/C, F/C, cough, congestion, urinary symptoms.  Denies blood in stool, blood in urine.  Reports 1 week prior had a URI, now completely resolved.  Denies recent travel, sick contacts without history of abdominal surgeries.  Patient arrives HDS well-appearing and resting tach, PE as noted.  Low suspicion for acute surgical abdomen.  Will assess for colitis.  Labs, imaging, reassess.

## 2024-06-04 NOTE — ED PROVIDER NOTE - PROGRESS NOTE DETAILS
Colton Spear DO: Patient reassessed, NAD, non-toxic appearing. results dw pt/family, questions answered.  improved sx. tolerating po. pain controlled. dc w/ strict return precautions.

## 2024-06-04 NOTE — ED ADULT NURSE NOTE - OBJECTIVE STATEMENT
This is a 68 y/0 male complaining right sided abdominal pain, no known injury 4/10. Alert and oriented x 4. Past medical history of HTN, Insulin dependent DM, HLD. Appears uncomfortable. Denies urinary symptoms, normal bowel movement, denies vomiting nor nausea. Denies numbness nor tingling to lower extremities. Not in any distress. IV initiated on right arm g 20, due meds given. Butterfly to obtain blood work and sent to lab.

## 2024-06-04 NOTE — ED PROVIDER NOTE - PATIENT PORTAL LINK FT
You can access the FollowMyHealth Patient Portal offered by [facility] by registering at the following website: [email or web address]. By joining [employer]’[facility] FollowMyHealth portal, you will also be able to view your health information using other applications (apps) compatible with our system.

## 2024-06-05 LAB
CULTURE RESULTS: SIGNIFICANT CHANGE UP
SPECIMEN SOURCE: SIGNIFICANT CHANGE UP

## 2025-06-11 NOTE — CONSULT NOTE ADULT - PROVIDER SPECIALTY LIST ADULT
MICU Pt. will participate in groups and the milieu treatment structure of the unit. Pt will participate in groups and milieu treatment structure of the unit Pt. will participate in groups and the milieu treatment structure of the unit. Pt. will participate in groups and the milieu treatment structure of the unit. Pt. will participate in groups and the milieu treatment structure of the unit. Pt. will participate in groups and the milieu treatment structure of the unit.